# Patient Record
Sex: FEMALE | Race: WHITE | Employment: UNEMPLOYED | RURAL
[De-identification: names, ages, dates, MRNs, and addresses within clinical notes are randomized per-mention and may not be internally consistent; named-entity substitution may affect disease eponyms.]

---

## 2017-01-31 ENCOUNTER — HOSPITAL ENCOUNTER (OUTPATIENT)
Dept: OTHER | Age: 27
Discharge: HOME | End: 2017-01-31
Attending: NURSE PRACTITIONER | Admitting: NURSE PRACTITIONER

## 2017-11-16 ENCOUNTER — HOSPITAL ENCOUNTER (OUTPATIENT)
Facility: HOSPITAL | Age: 27
Setting detail: OBSERVATION
Discharge: LEFT BEFORE DISCHARGE | End: 2017-11-16
Attending: NURSE PRACTITIONER | Admitting: NURSE PRACTITIONER

## 2017-11-16 VITALS
DIASTOLIC BLOOD PRESSURE: 71 MMHG | OXYGEN SATURATION: 100 % | WEIGHT: 155 LBS | TEMPERATURE: 98.7 F | SYSTOLIC BLOOD PRESSURE: 110 MMHG | BODY MASS INDEX: 30.43 KG/M2 | HEART RATE: 78 BPM | HEIGHT: 60 IN

## 2017-11-16 PROBLEM — Z34.90 PREGNANCY: Status: ACTIVE | Noted: 2017-11-16

## 2017-11-16 LAB
AMPHET+METHAMPHET UR QL: NEGATIVE
AMPHETAMINES UR QL: NEGATIVE
BARBITURATES UR QL SCN: NEGATIVE
BENZODIAZ UR QL SCN: NEGATIVE
BILIRUB BLD-MCNC: NEGATIVE MG/DL
BUPRENORPHINE SERPL-MCNC: POSITIVE NG/ML
CANNABINOIDS SERPL QL: NEGATIVE
CLARITY, POC: CLEAR
COCAINE UR QL: NEGATIVE
COLOR UR: YELLOW
GLUCOSE UR STRIP-MCNC: NEGATIVE MG/DL
KETONES UR QL: NEGATIVE
LEUKOCYTE EST, POC: NEGATIVE
METHADONE UR QL SCN: NEGATIVE
NITRITE UR-MCNC: NEGATIVE MG/ML
OPIATES UR QL: NEGATIVE
OXYCODONE UR QL SCN: NEGATIVE
PCP UR QL SCN: NEGATIVE
PH UR: 6.5 [PH] (ref 5–8)
PROPOXYPH UR QL: NEGATIVE
PROT UR STRIP-MCNC: NEGATIVE MG/DL
RBC # UR STRIP: NEGATIVE /UL
SP GR UR: 1.01 (ref 1–1.03)
TRICYCLICS UR QL SCN: NEGATIVE
UROBILINOGEN UR QL: NORMAL

## 2017-11-16 PROCEDURE — 59025 FETAL NON-STRESS TEST: CPT

## 2017-11-16 PROCEDURE — 99234 HOSP IP/OBS SM DT SF/LOW 45: CPT | Performed by: NURSE PRACTITIONER

## 2017-11-16 PROCEDURE — G0378 HOSPITAL OBSERVATION PER HR: HCPCS

## 2017-11-16 PROCEDURE — 80306 DRUG TEST PRSMV INSTRMNT: CPT | Performed by: NURSE PRACTITIONER

## 2017-11-16 PROCEDURE — 81002 URINALYSIS NONAUTO W/O SCOPE: CPT | Performed by: NURSE PRACTITIONER

## 2017-11-16 PROCEDURE — 59025 FETAL NON-STRESS TEST: CPT | Performed by: NURSE PRACTITIONER

## 2017-11-16 PROCEDURE — G0463 HOSPITAL OUTPT CLINIC VISIT: HCPCS

## 2017-11-16 RX ORDER — BUPRENORPHINE 2 MG/1
1 TABLET SUBLINGUAL DAILY
COMMUNITY
End: 2022-10-21 | Stop reason: ALTCHOICE

## 2017-11-16 RX ORDER — SODIUM CHLORIDE, SODIUM LACTATE, POTASSIUM CHLORIDE, CALCIUM CHLORIDE 600; 310; 30; 20 MG/100ML; MG/100ML; MG/100ML; MG/100ML
999 INJECTION, SOLUTION INTRAVENOUS CONTINUOUS
Status: DISCONTINUED | OUTPATIENT
Start: 2017-11-16 | End: 2017-11-16 | Stop reason: HOSPADM

## 2017-11-16 RX ORDER — PRENATAL VIT NO.126/IRON/FOLIC 28MG-0.8MG
1 TABLET ORAL DAILY
COMMUNITY
End: 2022-10-21

## 2017-11-17 NOTE — NON STRESS TEST
Shira Toro, a  at 39w5d with an BALWINDER of 2017, by Last Menstrual Period, was seen at [facility] for a nonstress test.    Chief Complaint   Patient presents with   • Abdominal Pain     Pt states she has had some cramping in her lower abdomen, and aching in bilateral hips.        Interpretation A  Nonstress Test Interpretation A: Reactive (17 1900 : Jie Salazar RN)

## 2017-11-17 NOTE — PROGRESS NOTES
TC per RN with report on Shira.  She is a  at 39 5/7 wks gestation.  Pt reports her PNC is per Dr. Pagan up to 36 wks gestation.  She was to continue f/u care with [facility] though has not made any appts.    She states she is high risk with an autoimmune issues associated with Chrons disease.  Also + fibromyalgia.  She also receives subutex 1 tablet /  Day  Previous  at term without complications.  Pt states she came in with hip and pelvic pain and also wanted to get baby checked out.  O.  VSS - AF        Ctx's occasional         FHT's 130 with decreased variability on arrival and accel to 155 and remained as baseline.        V/E closed / cx posterior  A. IUP at 39 5/7 wks gestation       + subutex therapy      No PNC x 3 + wks      No Prenatal hx available from Dr. Sarmiento office      FHT's require continued evaluation   P.  IVF's         Continued EFM         RN reported pt refused and was leaving -   RN informed pt she is leaving against medical advise as further evaluation of fetal well being is necessary.   Pt refuse to stay - signed out AMA.

## 2018-08-09 ENCOUNTER — LAB REQUISITION (OUTPATIENT)
Dept: LAB | Facility: HOSPITAL | Age: 28
End: 2018-08-09

## 2018-08-09 ENCOUNTER — OFFICE VISIT (OUTPATIENT)
Dept: NEUROLOGY | Facility: CLINIC | Age: 28
End: 2018-08-09

## 2018-08-09 VITALS
HEIGHT: 60 IN | SYSTOLIC BLOOD PRESSURE: 129 MMHG | HEART RATE: 92 BPM | WEIGHT: 170 LBS | BODY MASS INDEX: 33.38 KG/M2 | DIASTOLIC BLOOD PRESSURE: 81 MMHG

## 2018-08-09 DIAGNOSIS — F11.20 UNCOMPLICATED OPIOID DEPENDENCE (HCC): ICD-10-CM

## 2018-08-09 DIAGNOSIS — R53.82 CHRONIC FATIGUE: ICD-10-CM

## 2018-08-09 DIAGNOSIS — M79.7 FIBROMYALGIA: Primary | ICD-10-CM

## 2018-08-09 DIAGNOSIS — Z00.00 ROUTINE GENERAL MEDICAL EXAMINATION AT A HEALTH CARE FACILITY: ICD-10-CM

## 2018-08-09 PROCEDURE — 99244 OFF/OP CNSLTJ NEW/EST MOD 40: CPT | Performed by: PSYCHIATRY & NEUROLOGY

## 2018-08-09 PROCEDURE — 36415 COLL VENOUS BLD VENIPUNCTURE: CPT | Performed by: PSYCHIATRY & NEUROLOGY

## 2018-08-09 RX ORDER — GABAPENTIN 600 MG/1
800 TABLET ORAL 3 TIMES DAILY
COMMUNITY

## 2018-08-09 RX ORDER — PREGABALIN 50 MG/1
50 CAPSULE ORAL 3 TIMES DAILY
Qty: 60 CAPSULE | Refills: 2
Start: 2018-08-09 | End: 2019-08-09

## 2018-08-09 NOTE — PROGRESS NOTES
Subjective:     Patient ID: Shira Toro is a 28 y.o. female.    CC:   Chief Complaint   Patient presents with   • Fibromyalgia       HPI:   History of Present Illness  The following portions of the patient's history were reviewed and updated as appropriate: allergies, current medications, past family history, past medical history, past social history, past surgical history and problem list.     27 y/o female with h/o RA, FMS, Chrohn's, opiate dependence, previously seen by rheumatology who tells me started on gabapentin about 9 years ago and feels that it is no longer working for her. She has tried Lyrica in the past and has found it helpful. She has been on Cymbalta for several years. Complains of fairly severe diffuse aches and pains, muscle soreness, chronic fatigue.    Past Medical History:   Diagnosis Date   • Anemia    • Anxiety    • Arthritis    • Autoimmune disease (CMS/HCC)     chrohns related.    • Crohn's disease (CMS/HCC)    • Fibromyalgia        Past Surgical History:   Procedure Laterality Date   • COLONOSCOPY     • URACHUS EXCISION      urachus rupture as a baby       Social History     Social History   • Marital status: Single     Spouse name: N/A   • Number of children: N/A   • Years of education: N/A     Occupational History   • Not on file.     Social History Main Topics   • Smoking status: Current Every Day Smoker     Packs/day: 1.50     Types: Cigarettes   • Smokeless tobacco: Never Used   • Alcohol use No   • Drug use: Yes     Types: Heroin      Comment: Pt on subtex   • Sexual activity: Yes     Partners: Male     Other Topics Concern   • Not on file     Social History Narrative   • No narrative on file       Family History   Problem Relation Age of Onset   • Coronary artery disease Mother    • Heart attack Mother    • Hypertension Mother    • Hypertension Brother         Review of Systems   Constitutional: Positive for appetite change, fatigue and unexpected weight change. Negative for  chills and fever.   HENT: Negative for ear pain, hearing loss, nosebleeds, rhinorrhea and sore throat.    Eyes: Negative for photophobia, pain, discharge, itching and visual disturbance.   Respiratory: Positive for cough. Negative for chest tightness, shortness of breath and wheezing.    Cardiovascular: Negative for chest pain, palpitations and leg swelling.   Gastrointestinal: Positive for abdominal pain, diarrhea, nausea and vomiting. Negative for blood in stool and constipation.   Endocrine: Positive for cold intolerance.   Genitourinary: Negative for dysuria, frequency, hematuria and urgency.   Musculoskeletal: Positive for arthralgias, back pain, joint swelling, myalgias, neck pain and neck stiffness. Negative for gait problem.   Skin: Positive for pallor. Negative for rash and wound.   Allergic/Immunologic: Negative for environmental allergies and food allergies.   Neurological: Positive for light-headedness and numbness. Negative for dizziness, tremors, seizures, syncope, speech difficulty, weakness and headaches.   Hematological: Negative for adenopathy. Bruises/bleeds easily.   Psychiatric/Behavioral: Positive for decreased concentration. Negative for agitation, confusion, hallucinations, sleep disturbance and suicidal ideas. The patient is not nervous/anxious.         Objective:    Neurologic Exam     Mental Status   Oriented to person, place, and time.     Cranial Nerves     CN III, IV, VI   Pupils are equal, round, and reactive to light.  Extraocular motions are normal.     Motor Exam     Strength   Strength 5/5 throughout.       Physical Exam   Constitutional: She is oriented to person, place, and time. She appears well-developed and well-nourished.   HENT:   Head: Normocephalic and atraumatic.   Eyes: Pupils are equal, round, and reactive to light. EOM are normal.   Neck: Normal range of motion. Neck supple. Carotid bruit is not present.   Cardiovascular: Normal rate, regular rhythm, normal heart  sounds and intact distal pulses.    Pulmonary/Chest: Effort normal and breath sounds normal.   Abdominal: Soft. Bowel sounds are normal.   Musculoskeletal: Normal range of motion.   Bilateral nail clubbing   Neurological: She is alert and oriented to person, place, and time. She has normal strength and normal reflexes. No cranial nerve deficit or sensory deficit. She displays a negative Romberg sign. Coordination and gait normal. She displays no Babinski's sign on the right side. She displays no Babinski's sign on the left side.   Skin: Skin is warm.   Psychiatric: She has a normal mood and affect. Her behavior is normal. Thought content normal. Cognition and memory are normal.       Assessment/Plan:       Shira was seen today for fibromyalgia.    Diagnoses and all orders for this visit:    Fibromyalgia  -     C-reactive Protein  -     Comprehensive Metabolic Panel  -     CK  -     CBC & Differential  -     TSH  -     Vitamin B12  -     Methylmalonic Acid, Serum  -     Folate  -     RPR  -     Antinuclear Antibody With Reflex Cascade  -     Rheumatoid Factor  -     B. Burgdorferi Antibodies, WB Reflex  -     Drug Screen 11 w/Conf, Serum  -     pregabalin (LYRICA) 50 MG capsule; Take 1 capsule by mouth 3 (Three) Times a Day.    Chronic fatigue  -     TSH  -     Vitamin B12  -     Methylmalonic Acid, Serum  -     Folate    Uncomplicated opioid dependence (CMS/HCC)  -     Drug Screen 11 w/Conf, Serum         The patient has read and signed the [facility] Controlled Substance Contract.  I will continue to see patient for regular follow up appointments.  They are well controlled on their medication.  MIAN is updated every 3 months. The patient is aware of the potential for addiction and dependence.    Laureano Mi MD  8/9/2018

## 2018-08-10 ENCOUNTER — TELEPHONE (OUTPATIENT)
Dept: NEUROLOGY | Facility: CLINIC | Age: 28
End: 2018-08-10

## 2018-08-10 NOTE — TELEPHONE ENCOUNTER
----- Message from Pauline Espinal sent at 8/10/2018  9:13 AM EDT -----  Regarding: DR. CAMPBELL/MEDICATION  PATIENT HAS CALLED NEEDING A PA ON HER LYRICA.  PATIENT USES [facility] AND I CONFIRMED THE PHONE.  SHE HAD CALLED YESTERDAY TOO.

## 2018-08-16 LAB
11OH-THC SPEC-MCNC: NEGATIVE NG/ML
ALBUMIN SERPL-MCNC: 4.61 G/DL (ref 3.2–4.8)
ALBUMIN/GLOB SERPL: 1.7 G/DL (ref 1.5–2.5)
ALP SERPL-CCNC: 78 U/L (ref 25–100)
ALT SERPL-CCNC: 16 U/L (ref 7–40)
AMPHETAMINES SERPL QL SCN: NEGATIVE NG/ML
ANA SER QL: NEGATIVE
AST SERPL-CCNC: 19 U/L (ref 0–33)
B BURGDOR IGG+IGM SER-ACNC: <0.91 ISR (ref 0–0.9)
B BURGDOR IGM SER IA-ACNC: <0.8 INDEX (ref 0–0.79)
BARBITURATES SERPL QL SCN: NEGATIVE UG/ML
BASOPHILS # BLD AUTO: 0.04 10*3/MM3 (ref 0–0.2)
BASOPHILS NFR BLD AUTO: 0.4 % (ref 0–1)
BENZODIAZ SERPL QL SCN: NEGATIVE NG/ML
BILIRUB SERPL-MCNC: 0.2 MG/DL (ref 0.3–1.2)
BUN SERPL-MCNC: 9 MG/DL (ref 9–23)
BUN/CREAT SERPL: 13.4 (ref 7–25)
CALCIUM SERPL-MCNC: 9.3 MG/DL (ref 8.7–10.4)
CANNABIDIOL SERPLBLD CFM-MCNC: NEGATIVE NG/ML
CANNABINOIDS SERPL QL SCN: ABNORMAL NG/ML
CANNABINOIDS SPEC QL CFM: POSITIVE
CANNABINOL: NEGATIVE NG/ML
CARBOXYTHC SPEC-MCNC: 13.4 NG/ML
CHLORIDE SERPL-SCNC: 112 MMOL/L (ref 99–109)
CK SERPL-CCNC: 73 U/L (ref 26–174)
CO2 SERPL-SCNC: 25 MMOL/L (ref 20–31)
COCAINE+BZE SERPL QL SCN: NEGATIVE NG/ML
CREAT SERPL-MCNC: 0.67 MG/DL (ref 0.6–1.3)
CRP SERPL-MCNC: 0.22 MG/DL (ref 0–1)
EOSINOPHIL # BLD AUTO: 0.75 10*3/MM3 (ref 0–0.3)
EOSINOPHIL NFR BLD AUTO: 8.3 % (ref 0–3)
ERYTHROCYTE [DISTWIDTH] IN BLOOD BY AUTOMATED COUNT: 12.7 % (ref 11.3–14.5)
ETHANOL UR-MCNC: NEGATIVE GM/DL
FOLATE SERPL-MCNC: 8.01 NG/ML (ref 3.2–20)
GLOBULIN SER CALC-MCNC: 2.8 GM/DL
GLUCOSE SERPL-MCNC: 86 MG/DL (ref 70–100)
HCT VFR BLD AUTO: 46.1 % (ref 34.5–44)
HGB BLD-MCNC: 15.4 G/DL (ref 11.5–15.5)
IMM GRANULOCYTES # BLD: 0.02 10*3/MM3 (ref 0–0.03)
IMM GRANULOCYTES NFR BLD: 0.2 % (ref 0–0.6)
LYMPHOCYTES # BLD AUTO: 3.06 10*3/MM3 (ref 0.6–4.8)
LYMPHOCYTES NFR BLD AUTO: 33.9 % (ref 24–44)
Lab: NORMAL
Lab: NORMAL
MCH RBC QN AUTO: 31 PG (ref 27–31)
MCHC RBC AUTO-ENTMCNC: 33.4 G/DL (ref 32–36)
MCV RBC AUTO: 92.8 FL (ref 80–99)
METHADONE SERPL QL SCN: NEGATIVE NG/ML
METHYLMALONATE SERPL-SCNC: 149 NMOL/L (ref 0–378)
MONOCYTES # BLD AUTO: 0.35 10*3/MM3 (ref 0–1)
MONOCYTES NFR BLD AUTO: 3.9 % (ref 0–12)
NEUTROPHILS # BLD AUTO: 4.83 10*3/MM3 (ref 1.5–8.3)
NEUTROPHILS NFR BLD AUTO: 53.5 % (ref 41–71)
OPIATES SERPL QL SCN: NEGATIVE NG/ML
OXYCODONE+OXYMORPHONE SERPLBLD QL SCN: NEGATIVE NG/ML
PCP SERPL QL SCN: NEGATIVE NG/ML
PLATELET # BLD AUTO: 236 10*3/MM3 (ref 150–450)
POTASSIUM SERPL-SCNC: 4 MMOL/L (ref 3.5–5.5)
PROPOXYPH SERPL QL SCN: NEGATIVE NG/ML
PROT SERPL-MCNC: 7.4 G/DL (ref 5.7–8.2)
RBC # BLD AUTO: 4.97 10*6/MM3 (ref 3.89–5.14)
RHEUMATOID FACT SERPL-ACNC: <10 IU/ML (ref 0–13.9)
RPR SER QL: NON REACTIVE
SODIUM SERPL-SCNC: 140 MMOL/L (ref 132–146)
THC SERPLBLD CFM-MCNC: 1.8 NG/ML
TSH SERPL DL<=0.005 MIU/L-ACNC: 1.17 MIU/ML (ref 0.35–5.35)
VIT B12 SERPL-MCNC: 744 PG/ML (ref 211–911)
WBC # BLD AUTO: 9.03 10*3/MM3 (ref 3.5–10.8)

## 2018-08-17 ENCOUNTER — TELEPHONE (OUTPATIENT)
Dept: NEUROLOGY | Facility: CLINIC | Age: 28
End: 2018-08-17

## 2018-08-17 NOTE — TELEPHONE ENCOUNTER
----- Message from Laureano Mi MD sent at 8/17/2018  8:27 AM EDT -----  Regarding: labs  Normal, thanks.  ----- Message -----  From: Interface, Reflab Results In  Sent: 8/16/2018  11:16 AM  To: Laureano Mi MD

## 2019-03-29 ENCOUNTER — HOSPITAL ENCOUNTER (EMERGENCY)
Facility: HOSPITAL | Age: 29
Discharge: HOME | End: 2019-03-30
Attending: FAMILY MEDICINE
Payer: COMMERCIAL

## 2019-03-29 DIAGNOSIS — S93.402A SPRAIN OF LEFT ANKLE, UNSPECIFIED LIGAMENT, INITIAL ENCOUNTER: Primary | ICD-10-CM

## 2019-03-29 PROCEDURE — 99283 EMERGENCY DEPT VISIT LOW MDM: CPT

## 2019-03-29 ASSESSMENT — PAIN DESCRIPTION - LOCATION: LOCATION: ANKLE;FOOT

## 2019-03-29 ASSESSMENT — PAIN DESCRIPTION - DESCRIPTORS: DESCRIPTORS: SORE;PRESSURE;SHARP

## 2019-03-29 ASSESSMENT — PAIN DESCRIPTION - ORIENTATION: ORIENTATION: LEFT

## 2019-03-29 ASSESSMENT — PAIN DESCRIPTION - PAIN TYPE: TYPE: ACUTE PAIN

## 2019-03-29 ASSESSMENT — PAIN DESCRIPTION - ONSET: ONSET: ON-GOING

## 2019-03-29 ASSESSMENT — PAIN SCALES - GENERAL: PAINLEVEL_OUTOF10: 8

## 2019-03-29 ASSESSMENT — PAIN DESCRIPTION - FREQUENCY: FREQUENCY: INTERMITTENT

## 2019-03-30 ENCOUNTER — APPOINTMENT (OUTPATIENT)
Dept: GENERAL RADIOLOGY | Facility: HOSPITAL | Age: 29
End: 2019-03-30
Payer: COMMERCIAL

## 2019-03-30 VITALS
RESPIRATION RATE: 20 BRPM | TEMPERATURE: 98.3 F | SYSTOLIC BLOOD PRESSURE: 136 MMHG | BODY MASS INDEX: 41.23 KG/M2 | WEIGHT: 210 LBS | HEIGHT: 60 IN | DIASTOLIC BLOOD PRESSURE: 82 MMHG | OXYGEN SATURATION: 99 % | HEART RATE: 91 BPM

## 2019-03-30 PROCEDURE — 73610 X-RAY EXAM OF ANKLE: CPT

## 2019-03-30 NOTE — ED NOTES
Pt. Discharged home with take home instructions and work excuse pt. To follow up with PCP in 1 week pt.  Indicates understands instructions      Polly Soto RN  03/30/19 [id number]

## 2019-03-30 NOTE — ED PROVIDER NOTES
hcl]    FAMILY HISTORY       Family History   Problem Relation Age of Onset    Heart Disease Mother           SOCIAL HISTORY       Social History     Socioeconomic History    Marital status: Single     Spouse name: None    Number of children: None    Years of education: None    Highest education level: None   Occupational History    None   Social Needs    Financial resource strain: None    Food insecurity:     Worry: None     Inability: None    Transportation needs:     Medical: None     Non-medical: None   Tobacco Use    Smoking status: Current Every Day Smoker     Packs/day: 1.00     Years: 5.00     Pack years: 5.00     Types: Cigarettes    Smokeless tobacco: Never Used   Substance and Sexual Activity    Alcohol use: No    Drug use: No    Sexual activity: Yes     Partners: Male   Lifestyle    Physical activity:     Days per week: None     Minutes per session: None    Stress: None   Relationships    Social connections:     Talks on phone: None     Gets together: None     Attends Gnosticist service: None     Active member of club or organization: None     Attends meetings of clubs or organizations: None     Relationship status: None    Intimate partner violence:     Fear of current or ex partner: None     Emotionally abused: None     Physically abused: None     Forced sexual activity: None   Other Topics Concern    None   Social History Narrative    None       SCREENINGS             PHYSICAL EXAM    (up to 7 for level 4, 8 or more for level 5)     ED Triage Vitals [03/29/19 2325]   BP Temp Temp Source Pulse Resp SpO2 Height Weight   (!) 131/91 99 °F (37.2 °C) Oral 106 18 98 % 5' (1.524 m) 210 lb (95.3 kg)       Physical Exam   Constitutional: She is oriented to person, place, and time. She appears well-developed and well-nourished. No distress. HENT:   Head: Normocephalic and atraumatic. Eyes: Pupils are equal, round, and reactive to light. Conjunctivae and EOM are normal.   Neck: Neck supple. CONSULTS:  None    PROCEDURES:  ACE placed to left ankle, by me, 4\" wrap, good fit, neurovascularly intact left foot. PROGRESS NOTES:    Will get film to assess ankle. Was going to give pt NSAIDs but allergic to Tramadol, NSAIDs and Codeine. Pt now says she is on Suboxone. No meds given. She can take Tylenol at home. FINAL IMPRESSION      1.  Sprain of left ankle, unspecified ligament, initial encounter New Problem         DISPOSITION/PLAN   DISPOSITION        PATIENT REFERRED TO:  BOY Stokes CNP  [address] [place] [id number]  [phone]    Schedule an appointment as soon as possible for a visit in 1 week  If symptoms worsen      DISCHARGE MEDICATIONS:  Current Discharge Medication List          (Please note that portions of this note were completed with a voice recognition program.  Efforts were made to edit the dictations but occasionallywords are mis-transcribed.)    Cydney Bhatia DO (electronically signed)  Attending Emergency Physician          Cydney Bhatia DO  03/30/19 74-03 [facility],   03/30/19 8850

## 2019-05-13 ENCOUNTER — HOSPITAL ENCOUNTER (OUTPATIENT)
Facility: HOSPITAL | Age: 29
Discharge: HOME | End: 2019-05-13
Payer: COMMERCIAL

## 2019-05-13 PROCEDURE — 86480 TB TEST CELL IMMUN MEASURE: CPT

## 2019-05-16 LAB
QUANTI TB GOLD PLUS: NEGATIVE
QUANTI TB1 MINUS NIL: 0.06 IU/ML (ref 0–0.34)
QUANTI TB2 MINUS NIL: 0.06 IU/ML (ref 0–0.34)
QUANTIFERON MITOGEN: >10 IU/ML
QUANTIFERON NIL: 0.18 IU/ML

## 2019-06-22 ENCOUNTER — APPOINTMENT (OUTPATIENT)
Dept: GENERAL RADIOLOGY | Facility: HOSPITAL | Age: 29
End: 2019-06-22

## 2019-06-22 ENCOUNTER — HOSPITAL ENCOUNTER (EMERGENCY)
Facility: HOSPITAL | Age: 29
Discharge: HOME | End: 2019-06-22
Attending: EMERGENCY MEDICINE | Admitting: EMERGENCY MEDICINE

## 2019-06-22 VITALS
TEMPERATURE: 97.9 F | HEIGHT: 60 IN | DIASTOLIC BLOOD PRESSURE: 76 MMHG | BODY MASS INDEX: 41.23 KG/M2 | RESPIRATION RATE: 19 BRPM | WEIGHT: 210 LBS | HEART RATE: 98 BPM | SYSTOLIC BLOOD PRESSURE: 128 MMHG | OXYGEN SATURATION: 98 %

## 2019-06-22 DIAGNOSIS — S93.602A FOOT SPRAIN, LEFT, INITIAL ENCOUNTER: Primary | ICD-10-CM

## 2019-06-22 PROCEDURE — 73630 X-RAY EXAM OF FOOT: CPT

## 2019-06-22 PROCEDURE — 99283 EMERGENCY DEPT VISIT LOW MDM: CPT

## 2019-06-22 RX ORDER — ACETAMINOPHEN 325 MG/1
975 TABLET ORAL ONCE
Status: COMPLETED | OUTPATIENT
Start: 2019-06-22 | End: 2019-06-22

## 2019-06-22 RX ORDER — BUPRENORPHINE HYDROCHLORIDE AND NALOXONE HYDROCHLORIDE DIHYDRATE 8; 2 MG/1; MG/1
1 TABLET SUBLINGUAL DAILY
COMMUNITY
End: 2022-10-21 | Stop reason: ALTCHOICE

## 2019-06-22 RX ADMIN — ACETAMINOPHEN 975 MG: 325 TABLET, FILM COATED ORAL at 22:50

## 2019-10-22 ENCOUNTER — HOSPITAL ENCOUNTER (OUTPATIENT)
Facility: HOSPITAL | Age: 29
Discharge: HOME | End: 2019-10-22
Payer: COMMERCIAL

## 2019-10-22 ENCOUNTER — OFFICE VISIT (OUTPATIENT)
Dept: PRIMARY CARE CLINIC | Age: 29
End: 2019-10-22
Payer: COMMERCIAL

## 2019-10-22 VITALS
OXYGEN SATURATION: 98 % | HEIGHT: 60 IN | BODY MASS INDEX: 35.14 KG/M2 | DIASTOLIC BLOOD PRESSURE: 84 MMHG | WEIGHT: 179 LBS | HEART RATE: 110 BPM | SYSTOLIC BLOOD PRESSURE: 130 MMHG

## 2019-10-22 DIAGNOSIS — M54.2 NECK PAIN: ICD-10-CM

## 2019-10-22 DIAGNOSIS — M79.7 FIBROMYALGIA: ICD-10-CM

## 2019-10-22 DIAGNOSIS — F11.11 HISTORY OF OPIOID ABUSE (HCC): ICD-10-CM

## 2019-10-22 DIAGNOSIS — Z79.899 MEDICATION MANAGEMENT: ICD-10-CM

## 2019-10-22 DIAGNOSIS — Z87.19 HISTORY OF CROHN'S DISEASE: ICD-10-CM

## 2019-10-22 DIAGNOSIS — M54.2 NECK PAIN: Primary | ICD-10-CM

## 2019-10-22 LAB
A/G RATIO: 1.2 (ref 0.8–2)
ALBUMIN SERPL-MCNC: 4.3 G/DL (ref 3.4–4.8)
ALP BLD-CCNC: 91 U/L (ref 25–100)
ALT SERPL-CCNC: 8 U/L (ref 4–36)
ANION GAP SERPL CALCULATED.3IONS-SCNC: 17 MMOL/L (ref 3–16)
AST SERPL-CCNC: 12 U/L (ref 8–33)
BASOPHILS ABSOLUTE: 0 K/UL (ref 0–0.1)
BASOPHILS RELATIVE PERCENT: 0.4 %
BILIRUB SERPL-MCNC: 0.3 MG/DL (ref 0.3–1.2)
BUN BLDV-MCNC: 4 MG/DL (ref 6–20)
CALCIUM SERPL-MCNC: 9.8 MG/DL (ref 8.5–10.5)
CHLORIDE BLD-SCNC: 102 MMOL/L (ref 98–107)
CO2: 24 MMOL/L (ref 20–30)
CREAT SERPL-MCNC: 0.6 MG/DL (ref 0.4–1.2)
EOSINOPHILS ABSOLUTE: 0.2 K/UL (ref 0–0.4)
EOSINOPHILS RELATIVE PERCENT: 1.7 %
GFR AFRICAN AMERICAN: >59
GFR NON-AFRICAN AMERICAN: >60
GLOBULIN: 3.5 G/DL
GLUCOSE BLD-MCNC: 84 MG/DL (ref 74–106)
HCT VFR BLD CALC: 47.1 % (ref 37–47)
HEMOGLOBIN: 14.8 G/DL (ref 11.5–16.5)
IMMATURE GRANULOCYTES #: 0 K/UL
IMMATURE GRANULOCYTES %: 0.3 % (ref 0–5)
LYMPHOCYTES ABSOLUTE: 2.6 K/UL (ref 1.5–4)
LYMPHOCYTES RELATIVE PERCENT: 23.4 %
MCH RBC QN AUTO: 29.8 PG (ref 27–32)
MCHC RBC AUTO-ENTMCNC: 31.4 G/DL (ref 31–35)
MCV RBC AUTO: 94.8 FL (ref 80–100)
MONOCYTES ABSOLUTE: 0.6 K/UL (ref 0.2–0.8)
MONOCYTES RELATIVE PERCENT: 5.8 %
NEUTROPHILS ABSOLUTE: 7.6 K/UL (ref 2–7.5)
NEUTROPHILS RELATIVE PERCENT: 68.4 %
PDW BLD-RTO: 12.3 % (ref 11–16)
PLATELET # BLD: 376 K/UL (ref 150–400)
PMV BLD AUTO: 10.2 FL (ref 6–10)
POTASSIUM SERPL-SCNC: 4.5 MMOL/L (ref 3.4–5.1)
RBC # BLD: 4.97 M/UL (ref 3.8–5.8)
SODIUM BLD-SCNC: 143 MMOL/L (ref 136–145)
TOTAL PROTEIN: 7.8 G/DL (ref 6.4–8.3)
TSH REFLEX: 1.16 UIU/ML (ref 0.35–5.5)
WBC # BLD: 11.1 K/UL (ref 4–11)

## 2019-10-22 PROCEDURE — G8419 CALC BMI OUT NRM PARAM NOF/U: HCPCS | Performed by: PEDIATRICS

## 2019-10-22 PROCEDURE — 4004F PT TOBACCO SCREEN RCVD TLK: CPT | Performed by: PEDIATRICS

## 2019-10-22 PROCEDURE — 96372 THER/PROPH/DIAG INJ SC/IM: CPT | Performed by: PEDIATRICS

## 2019-10-22 PROCEDURE — 85025 COMPLETE CBC W/AUTO DIFF WBC: CPT

## 2019-10-22 PROCEDURE — G8427 DOCREV CUR MEDS BY ELIG CLIN: HCPCS | Performed by: PEDIATRICS

## 2019-10-22 PROCEDURE — G8484 FLU IMMUNIZE NO ADMIN: HCPCS | Performed by: PEDIATRICS

## 2019-10-22 PROCEDURE — 99203 OFFICE O/P NEW LOW 30 MIN: CPT | Performed by: PEDIATRICS

## 2019-10-22 PROCEDURE — 80053 COMPREHEN METABOLIC PANEL: CPT

## 2019-10-22 PROCEDURE — 84443 ASSAY THYROID STIM HORMONE: CPT

## 2019-10-22 RX ORDER — METHYLPREDNISOLONE ACETATE 80 MG/ML
80 INJECTION, SUSPENSION INTRA-ARTICULAR; INTRALESIONAL; INTRAMUSCULAR; SOFT TISSUE ONCE
Status: COMPLETED | OUTPATIENT
Start: 2019-10-22 | End: 2019-10-22

## 2019-10-22 RX ORDER — BUPRENORPHINE AND NALOXONE 8; 2 MG/1; MG/1
1 FILM, SOLUBLE BUCCAL; SUBLINGUAL DAILY
COMMUNITY
End: 2022-05-27 | Stop reason: ALTCHOICE

## 2019-10-22 RX ORDER — METHYLPREDNISOLONE 4 MG/1
TABLET ORAL
Qty: 1 KIT | Refills: 0 | Status: SHIPPED | OUTPATIENT
Start: 2019-10-22 | End: 2019-10-28

## 2019-10-22 RX ORDER — DULOXETIN HYDROCHLORIDE 60 MG/1
120 CAPSULE, DELAYED RELEASE ORAL DAILY
Qty: 30 CAPSULE | Refills: 3 | Status: SHIPPED | OUTPATIENT
Start: 2019-10-22 | End: 2020-01-28 | Stop reason: SDUPTHER

## 2019-10-22 RX ORDER — KETOROLAC TROMETHAMINE 30 MG/ML
30 INJECTION, SOLUTION INTRAMUSCULAR; INTRAVENOUS ONCE
Status: COMPLETED | OUTPATIENT
Start: 2019-10-22 | End: 2019-10-22

## 2019-10-22 RX ADMIN — METHYLPREDNISOLONE ACETATE 80 MG: 80 INJECTION, SUSPENSION INTRA-ARTICULAR; INTRALESIONAL; INTRAMUSCULAR; SOFT TISSUE at 12:31

## 2019-10-22 RX ADMIN — KETOROLAC TROMETHAMINE 30 MG: 30 INJECTION, SOLUTION INTRAMUSCULAR; INTRAVENOUS at 12:31

## 2019-10-22 ASSESSMENT — ENCOUNTER SYMPTOMS
BACK PAIN: 0
SORE THROAT: 0
SINUS PRESSURE: 0
NAUSEA: 0
SHORTNESS OF BREATH: 0
WHEEZING: 0
VOMITING: 0
COUGH: 0
ABDOMINAL PAIN: 0
EYE DISCHARGE: 0

## 2019-10-22 ASSESSMENT — PATIENT HEALTH QUESTIONNAIRE - PHQ9
SUM OF ALL RESPONSES TO PHQ QUESTIONS 1-9: 0
1. LITTLE INTEREST OR PLEASURE IN DOING THINGS: 0
SUM OF ALL RESPONSES TO PHQ QUESTIONS 1-9: 0
SUM OF ALL RESPONSES TO PHQ9 QUESTIONS 1 & 2: 0
2. FEELING DOWN, DEPRESSED OR HOPELESS: 0

## 2019-10-24 RX ORDER — BACLOFEN 10 MG/1
10 TABLET ORAL 2 TIMES DAILY PRN
Qty: 60 TABLET | Refills: 1 | Status: SHIPPED | OUTPATIENT
Start: 2019-10-24 | End: 2019-11-19 | Stop reason: SDUPTHER

## 2019-10-29 ENCOUNTER — OFFICE VISIT (OUTPATIENT)
Dept: PRIMARY CARE CLINIC | Age: 29
End: 2019-10-29
Payer: COMMERCIAL

## 2019-10-29 VITALS
WEIGHT: 177 LBS | HEIGHT: 60 IN | HEART RATE: 89 BPM | OXYGEN SATURATION: 98 % | DIASTOLIC BLOOD PRESSURE: 80 MMHG | BODY MASS INDEX: 34.75 KG/M2 | SYSTOLIC BLOOD PRESSURE: 130 MMHG | RESPIRATION RATE: 16 BRPM

## 2019-10-29 DIAGNOSIS — M79.7 FIBROMYALGIA: Primary | ICD-10-CM

## 2019-10-29 PROBLEM — F11.20 UNCOMPLICATED OPIOID DEPENDENCE (HCC): Status: ACTIVE | Noted: 2018-08-09

## 2019-10-29 PROCEDURE — 99213 OFFICE O/P EST LOW 20 MIN: CPT | Performed by: PEDIATRICS

## 2019-10-29 PROCEDURE — G8484 FLU IMMUNIZE NO ADMIN: HCPCS | Performed by: PEDIATRICS

## 2019-10-29 PROCEDURE — 4004F PT TOBACCO SCREEN RCVD TLK: CPT | Performed by: PEDIATRICS

## 2019-10-29 PROCEDURE — G8427 DOCREV CUR MEDS BY ELIG CLIN: HCPCS | Performed by: PEDIATRICS

## 2019-10-29 PROCEDURE — G8417 CALC BMI ABV UP PARAM F/U: HCPCS | Performed by: PEDIATRICS

## 2019-10-29 RX ORDER — GABAPENTIN 600 MG/1
600 TABLET ORAL 4 TIMES DAILY
Qty: 120 TABLET | Refills: 2 | Status: SHIPPED | OUTPATIENT
Start: 2019-10-29 | End: 2020-01-28 | Stop reason: SDUPTHER

## 2019-10-29 ASSESSMENT — ENCOUNTER SYMPTOMS
VOMITING: 0
WHEEZING: 0
COUGH: 0
NAUSEA: 0
SINUS PRESSURE: 0
EYE DISCHARGE: 0
BACK PAIN: 0
SHORTNESS OF BREATH: 0
SORE THROAT: 0
ABDOMINAL PAIN: 0

## 2019-11-19 RX ORDER — BACLOFEN 10 MG/1
10 TABLET ORAL 2 TIMES DAILY PRN
Qty: 60 TABLET | Refills: 1 | Status: SHIPPED | OUTPATIENT
Start: 2019-11-19 | End: 2020-01-28 | Stop reason: SDUPTHER

## 2020-01-28 ENCOUNTER — OFFICE VISIT (OUTPATIENT)
Dept: PRIMARY CARE CLINIC | Age: 30
End: 2020-01-28
Payer: MEDICAID

## 2020-01-28 VITALS
DIASTOLIC BLOOD PRESSURE: 80 MMHG | RESPIRATION RATE: 16 BRPM | WEIGHT: 147 LBS | SYSTOLIC BLOOD PRESSURE: 126 MMHG | HEIGHT: 60 IN | BODY MASS INDEX: 28.86 KG/M2 | OXYGEN SATURATION: 99 % | HEART RATE: 115 BPM

## 2020-01-28 PROCEDURE — 99214 OFFICE O/P EST MOD 30 MIN: CPT | Performed by: PEDIATRICS

## 2020-01-28 RX ORDER — DULOXETIN HYDROCHLORIDE 60 MG/1
120 CAPSULE, DELAYED RELEASE ORAL DAILY
Qty: 30 CAPSULE | Refills: 3 | Status: SHIPPED | OUTPATIENT
Start: 2020-01-28 | End: 2020-07-15

## 2020-01-28 RX ORDER — BACLOFEN 10 MG/1
10 TABLET ORAL 2 TIMES DAILY PRN
Qty: 60 TABLET | Refills: 1 | Status: SHIPPED | OUTPATIENT
Start: 2020-01-28 | End: 2020-02-26

## 2020-01-28 RX ORDER — ERGOCALCIFEROL 1.25 MG/1
50000 CAPSULE ORAL WEEKLY
Qty: 12 CAPSULE | Refills: 1 | Status: SHIPPED
Start: 2020-01-28 | End: 2020-07-15

## 2020-01-28 RX ORDER — GABAPENTIN 600 MG/1
600 TABLET ORAL 4 TIMES DAILY
Qty: 120 TABLET | Refills: 2 | Status: SHIPPED | OUTPATIENT
Start: 2020-01-28 | End: 2020-03-25

## 2020-01-28 RX ORDER — METHYLPREDNISOLONE 4 MG/1
TABLET ORAL
Qty: 1 KIT | Refills: 0 | Status: SHIPPED | OUTPATIENT
Start: 2020-01-28 | End: 2020-02-03

## 2020-01-28 ASSESSMENT — PATIENT HEALTH QUESTIONNAIRE - PHQ9
SUM OF ALL RESPONSES TO PHQ9 QUESTIONS 1 & 2: 0
SUM OF ALL RESPONSES TO PHQ QUESTIONS 1-9: 0
SUM OF ALL RESPONSES TO PHQ QUESTIONS 1-9: 0
2. FEELING DOWN, DEPRESSED OR HOPELESS: 0
1. LITTLE INTEREST OR PLEASURE IN DOING THINGS: 0

## 2020-01-28 ASSESSMENT — ENCOUNTER SYMPTOMS
EYE DISCHARGE: 0
BACK PAIN: 0
WHEEZING: 0
SINUS PRESSURE: 0
SHORTNESS OF BREATH: 0

## 2020-01-28 NOTE — PROGRESS NOTES
Chief Complaint   Patient presents with    Crohn's Disease     f/u    Other     fibromyalgia f/u       Have you seen any other physician or provider since your last visit no    Have you had any other diagnostic tests since your last visit? no    Have you changed or stopped any medications since your last visit? no     Pt states she has had pneumo inj before.

## 2020-01-28 NOTE — PROGRESS NOTES
SUBJECTIVE:    Patient ID: Rosalia Bynum is a 34 y.o. female. Chief Complaint   Patient presents with    Crohn's Disease     f/u    Other     fibromyalgia f/u       HPI:    Patient's medications, allergies, past medical, surgical,social and family histories were reviewed and updated as appropriate. She is here for a follow up and needs some refills. She has chronic neck pain and fibromyalgia. She also takes cymbalta for depression and nerve pain. She has vit d def. She is also asking for \"more steroids\". She says she has pain in different joints like her elbows and her knees. Neck Pain    This is a new problem. The current episode started 1 to 4 weeks ago. The problem occurs constantly. The problem has been gradually worsening. The pain is associated with nothing. The pain is present in the left side. The quality of the pain is described as aching. The pain is at a severity of 9/10. The pain is moderate. Nothing aggravates the symptoms. The pain is same all the time. Stiffness is present all day. Treatments tried: muscle cream. The treatment provided no relief. Review of Systems   HENT: Negative for sinus pressure. Eyes: Negative for discharge and visual disturbance. Respiratory: Negative for shortness of breath and wheezing. Cardiovascular: Negative for palpitations. Endocrine: Negative for cold intolerance and heat intolerance. Genitourinary: Negative for dysuria, frequency and urgency. Musculoskeletal: Negative for back pain. Skin: Negative for wound. Neurological: Negative for syncope. Hematological: Negative. Psychiatric/Behavioral: Negative for agitation and sleep disturbance. The patient is not nervous/anxious.         Past Medical History:   Diagnosis Date    Arthritis     Cro[facility]'s disease in remission ([facility] [address]     Fibromyalgia     Fibromyalgia     Immune deficiency disorder ([facility] [address]        Past Surgical History:   Procedure Laterality Date    AV FISTULA Conjunctiva/sclera: Conjunctivae normal.      Pupils: Pupils are equal, round, and reactive to light. Neck:      Musculoskeletal: Normal range of motion and neck supple. Thyroid: No thyromegaly. Vascular: No JVD. Trachea: No tracheal deviation. Cardiovascular:      Rate and Rhythm: Normal rate and regular rhythm. Heart sounds: Normal heart sounds. No murmur. Pulmonary:      Effort: Pulmonary effort is normal. No respiratory distress. Breath sounds: Normal breath sounds. No stridor. No wheezing or rales. Chest:      Chest wall: No tenderness. Abdominal:      General: Bowel sounds are normal. There is no distension. Palpations: Abdomen is soft. There is no mass. Tenderness: There is no abdominal tenderness. There is no guarding or rebound. Musculoskeletal: Normal range of motion. General: No tenderness. Cervical back: She exhibits pain. Back:         Legs:    Lymphadenopathy:      Cervical: No cervical adenopathy. Skin:     General: Skin is warm and dry. Findings: No rash. Neurological:      Mental Status: She is alert and oriented to person, place, and time. No results found for requested labs within last 30 days.        Microscopic Examination (no units)   Date Value   01/28/2015 Not Indicated     LDL Calculated (MG/DL)   Date Value   02/12/2014 106         Lab Results   Component Value Date    WBC 11.1 (H) 10/22/2019    HGB 14.8 10/22/2019    HCT 47.1 (H) 10/22/2019    MCV 94.8 10/22/2019     10/22/2019    LYMPHOPCT 23.4 10/22/2019    RBC 4.97 10/22/2019    MCH 29.8 10/22/2019    MCHC 31.4 10/22/2019    RDW 12.3 10/22/2019       Lab Results   Component Value Date     10/22/2019    K 4.5 10/22/2019     10/22/2019    CO2 24 10/22/2019    BUN 4 (L) 10/22/2019    CREATININE 0.6 10/22/2019    GLUCOSE 84 10/22/2019    CALCIUM 9.8 10/22/2019    PROT 7.8 10/22/2019    LABALBU 4.3 10/22/2019    BILITOT 0.3 10/22/2019

## 2020-01-28 NOTE — PATIENT INSTRUCTIONS
· Keep a list of your medicines with you. List all of the prescription medicines, nonprescription medicines, supplements, natural remedies, and vitamins that you take. Tell your healthcare providers who treat you about all of the products you are taking. Your provider can provide you with a form to keep track of them. Just ask. · Follow the directions that come with your medicine, including information about food or alcohol. Make sure you know how and when to take your medicine. Do not take more or less than you are supposed to take. · Keep all medicines out of the reach of children. · Store medicines according to the directions on the label. · Monitor yourself. Learn to know how your body reacts to your new medicine and keep track of how it makes you feel before attempting (If your provider has allowed you to do so) to drive or go to work. · Seek emergency medical attention if you think you have used too much of this medicine. An overdose of any prescription medicine can be fatal. Overdose symptoms may include extreme drowsiness, muscle weakness, confusion, cold and clammy skin, pinpoint pupils, shallow breathing, slow heart rate, fainting, or coma. · Don't share prescription medicines with others, even when they seem to have the same symptoms. What may be good for you may be harmful to others. · If you are no longer taking a prescribed medication and you have pills left please take your pills out of their original containers. Mix crushed pills with an undesirable substance, such as cat litter or used coffee grounds. Put the mixture into a disposable container with a lid, such as an empty margarine tub, or into a sealable bag. Cover up or remove any of your personal information on the empty containers by covering it with black permanent marker or duct tape. Place the sealed container with the mixture, and the empty drug containers, in the trash.    · If you use a medication that is in the form of a patch, dispose of used patches by folding them in half so that the sticky sides meet, and then flushing them down a toilet. They should not be placed in the household trash where children or pets can find them. · If you have any questions, ask your provider or pharmacist for more information. · Be sure to keep all appointments for provider visits or tests. We are committed to providing you with the best care possible. In order to help us achieve these goals please remember to bring all medications, herbal products, and over the counter supplements with you to each visit. If your provider has ordered testing for you, please be sure to follow up with our office if you have not received results within 7 days after the testing took place. *If you receive a survey after visiting one of our offices, please take time to share your experience concerning your physician office visit. These surveys are confidential and no health information about you is shared. We are eager to improve for you and we are counting on your feedback to help make that happen. Thank you for requesting your Continuity of Care Document (CCD) electronically. Please follow the instructions below to securely access your online medical record. [facility] allows you to send messages to your doctor, view your test results, renew your prescriptions, schedule appointments, and more. How Do I Access my CCD? In your Internet browser, go to [email or web address] Enter your user name and password   Click on My medical Record  --> Download Summary --> Enter Password --> Download --> Save or Open Document    Additional Information  If you have questions, please contact your physician practice where you receive care. Remember, [facility] is NOT to be used for urgent needs. For medical emergencies, dial 911. ips to Help You Stop Smoking       Cigarette smoking is a preventable cause of death in the [place].  If you have thought functioning without nicotine. Cut Down the Number of Cigarettes You Smoke   Smoke only half of each cigarette. Each day, postpone the lighting of your first cigarette by one hour. Decide you'll only smoke during odd or even hours of the day. Decide beforehand how many cigarettes you'll smoke during the day. For each additional cigarette, give a dollar to your favorite bart. Change your eating habits to help you cut down. For example, drink milk, which many people consider incompatible with smoking. End meals or snacks with something that won't lead to a cigarette. Reach for a glass of juice instead of a cigarette for a \"pick-me-up. \"   Remember: Cutting down can help you quit, but it's not a substitute for quitting. If you're down to about seven cigarettes a day, it's time to set your target quit date, and get ready to stick to it. Don't Smoke \"Automatically\"   Smoke only those cigarettes you really want. Catch yourself before you light up a cigarette out of pure habit. Don't empty your ashtrays. This will remind you of how many cigarettes you've smoked each day, and the sight and the smell of stale cigarettes butts will be very unpleasant. Make yourself aware of each cigarette by using the opposite hand or putting cigarettes in an unfamiliar location or a different pocket to break the automatic reach. If you light up many times during the day without even thinking about it, try to look in a mirror each time you put a match to your cigarette. You may decide you don't need it. Make Smoking Inconvenient   Stop buying cigarettes by the carton. Wait until one pack is empty before you buy another. Stop carrying cigarettes with you at home or at work. Make them difficult to get to. Make Smoking Unpleasant   Smoke only under circumstances that aren't especially pleasurable for you. If you like to smoke with others, smoke alone.  Turn your chair to an empty corner and focus only on the cigarette associate with cigarette smoking. Strike up conversation instead of a match for a cigarette. If you miss the sensation of having a cigarette in your hand, play with something elsea pencil, a paper clip, a marble. If you miss having something in your mouth, try toothpicks or a fake cigarette.

## 2020-02-26 RX ORDER — BACLOFEN 10 MG/1
10 TABLET ORAL 2 TIMES DAILY PRN
Qty: 60 TABLET | Refills: 1 | Status: SHIPPED | OUTPATIENT
Start: 2020-02-26 | End: 2020-04-15 | Stop reason: SDUPTHER

## 2020-04-15 RX ORDER — BACLOFEN 10 MG/1
10 TABLET ORAL 2 TIMES DAILY PRN
Qty: 60 TABLET | Refills: 1 | Status: SHIPPED | OUTPATIENT
Start: 2020-04-15 | End: 2020-06-23

## 2020-04-15 RX ORDER — GABAPENTIN 600 MG/1
600 TABLET ORAL 4 TIMES DAILY PRN
Qty: 120 TABLET | Refills: 1 | Status: SHIPPED | OUTPATIENT
Start: 2020-04-15 | End: 2020-06-16

## 2020-04-20 ENCOUNTER — TELEPHONE (OUTPATIENT)
Dept: PRIMARY CARE CLINIC | Age: 30
End: 2020-04-20

## 2020-04-20 NOTE — TELEPHONE ENCOUNTER
Has places on body needs infection medication and silvadene cream for the spots. She has Crohn's. disease which causes this.

## 2020-04-22 ENCOUNTER — VIRTUAL VISIT (OUTPATIENT)
Dept: PRIMARY CARE CLINIC | Age: 30
End: 2020-04-22
Payer: MEDICAID

## 2020-04-22 PROCEDURE — 98966 PH1 ASSMT&MGMT NQHP 5-10: CPT | Performed by: NURSE PRACTITIONER

## 2020-04-22 RX ORDER — SULFAMETHOXAZOLE AND TRIMETHOPRIM 800; 160 MG/1; MG/1
1 TABLET ORAL 2 TIMES DAILY
Qty: 20 TABLET | Refills: 0 | Status: SHIPPED | OUTPATIENT
Start: 2020-04-22 | End: 2020-04-25

## 2020-04-25 ENCOUNTER — HOSPITAL ENCOUNTER (EMERGENCY)
Facility: HOSPITAL | Age: 30
Discharge: HOME | End: 2020-04-25
Attending: FAMILY MEDICINE
Payer: MEDICAID

## 2020-04-25 VITALS
HEIGHT: 60 IN | TEMPERATURE: 98.5 F | BODY MASS INDEX: 25.52 KG/M2 | DIASTOLIC BLOOD PRESSURE: 81 MMHG | SYSTOLIC BLOOD PRESSURE: 114 MMHG | WEIGHT: 130 LBS | HEART RATE: 105 BPM | OXYGEN SATURATION: 100 % | RESPIRATION RATE: 16 BRPM

## 2020-04-25 LAB
BASOPHILS ABSOLUTE: 0.1 K/UL (ref 0–0.1)
BASOPHILS RELATIVE PERCENT: 0.6 %
EOSINOPHILS ABSOLUTE: 0.1 K/UL (ref 0–0.4)
EOSINOPHILS RELATIVE PERCENT: 1.1 %
HCT VFR BLD CALC: 41.4 % (ref 37–47)
HEMOGLOBIN: 12.9 G/DL (ref 11.5–16.5)
IMMATURE GRANULOCYTES #: 0.1 K/UL
IMMATURE GRANULOCYTES %: 0.5 % (ref 0–5)
LYMPHOCYTES ABSOLUTE: 4.2 K/UL (ref 1.5–4)
LYMPHOCYTES RELATIVE PERCENT: 33.3 %
MCH RBC QN AUTO: 26.7 PG (ref 27–32)
MCHC RBC AUTO-ENTMCNC: 31.2 G/DL (ref 31–35)
MCV RBC AUTO: 85.7 FL (ref 80–100)
MONOCYTES ABSOLUTE: 0.8 K/UL (ref 0.2–0.8)
MONOCYTES RELATIVE PERCENT: 6 %
NEUTROPHILS ABSOLUTE: 7.4 K/UL (ref 2–7.5)
NEUTROPHILS RELATIVE PERCENT: 58.5 %
PDW BLD-RTO: 13.7 % (ref 11–16)
PLATELET # BLD: 472 K/UL (ref 150–400)
PMV BLD AUTO: 8.1 FL (ref 6–10)
RBC # BLD: 4.83 M/UL (ref 3.8–5.8)
WBC # BLD: 12.6 K/UL (ref 4–11)

## 2020-04-25 PROCEDURE — 87077 CULTURE AEROBIC IDENTIFY: CPT

## 2020-04-25 PROCEDURE — 99283 EMERGENCY DEPT VISIT LOW MDM: CPT

## 2020-04-25 PROCEDURE — 87205 SMEAR GRAM STAIN: CPT

## 2020-04-25 PROCEDURE — 96365 THER/PROPH/DIAG IV INF INIT: CPT

## 2020-04-25 PROCEDURE — 87186 SC STD MICRODIL/AGAR DIL: CPT

## 2020-04-25 PROCEDURE — 36415 COLL VENOUS BLD VENIPUNCTURE: CPT

## 2020-04-25 PROCEDURE — 2500000003 HC RX 250 WO HCPCS: Performed by: FAMILY MEDICINE

## 2020-04-25 PROCEDURE — 85025 COMPLETE CBC W/AUTO DIFF WBC: CPT

## 2020-04-25 PROCEDURE — 96375 TX/PRO/DX INJ NEW DRUG ADDON: CPT

## 2020-04-25 PROCEDURE — 87070 CULTURE OTHR SPECIMN AEROBIC: CPT

## 2020-04-25 PROCEDURE — 2580000003 HC RX 258: Performed by: FAMILY MEDICINE

## 2020-04-25 PROCEDURE — 6360000002 HC RX W HCPCS: Performed by: FAMILY MEDICINE

## 2020-04-25 RX ORDER — DIPHENHYDRAMINE HYDROCHLORIDE 50 MG/ML
25 INJECTION INTRAMUSCULAR; INTRAVENOUS ONCE
Status: COMPLETED | OUTPATIENT
Start: 2020-04-25 | End: 2020-04-25

## 2020-04-25 RX ORDER — PREDNISONE 20 MG/1
TABLET ORAL
Qty: 13 TABLET | Refills: 0 | Status: SHIPPED | OUTPATIENT
Start: 2020-04-25 | End: 2020-07-15

## 2020-04-25 RX ORDER — CLINDAMYCIN HYDROCHLORIDE 300 MG/1
300 CAPSULE ORAL 3 TIMES DAILY
Qty: 21 CAPSULE | Refills: 0 | Status: SHIPPED | OUTPATIENT
Start: 2020-04-25 | End: 2020-05-02

## 2020-04-25 RX ORDER — METHYLPREDNISOLONE SODIUM SUCCINATE 125 MG/2ML
80 INJECTION, POWDER, LYOPHILIZED, FOR SOLUTION INTRAMUSCULAR; INTRAVENOUS ONCE
Status: COMPLETED | OUTPATIENT
Start: 2020-04-25 | End: 2020-04-25

## 2020-04-25 RX ORDER — KETOROLAC TROMETHAMINE 30 MG/ML
30 INJECTION, SOLUTION INTRAMUSCULAR; INTRAVENOUS ONCE
Status: COMPLETED | OUTPATIENT
Start: 2020-04-25 | End: 2020-04-25

## 2020-04-25 RX ADMIN — CEFTRIAXONE 1 G: 1 INJECTION, POWDER, FOR SOLUTION INTRAMUSCULAR; INTRAVENOUS at 03:29

## 2020-04-25 RX ADMIN — DIPHENHYDRAMINE HYDROCHLORIDE 25 MG: 50 INJECTION, SOLUTION INTRAMUSCULAR; INTRAVENOUS at 03:25

## 2020-04-25 RX ADMIN — METHYLPREDNISOLONE SODIUM SUCCINATE 80 MG: 125 INJECTION, POWDER, FOR SOLUTION INTRAMUSCULAR; INTRAVENOUS at 03:22

## 2020-04-25 RX ADMIN — SILVER SULFADIAZINE: 10 CREAM TOPICAL at 03:32

## 2020-04-25 RX ADMIN — KETOROLAC TROMETHAMINE 30 MG: 30 INJECTION, SOLUTION INTRAMUSCULAR at 03:27

## 2020-04-25 ASSESSMENT — PAIN SCALES - GENERAL
PAINLEVEL_OUTOF10: 6
PAINLEVEL_OUTOF10: 7

## 2020-04-25 ASSESSMENT — ENCOUNTER SYMPTOMS: COLOR CHANGE: 1

## 2020-04-25 NOTE — ED NOTES
Discharge instructions reviewed with patient with her verbalized/signed understanding. Patient called her father about arranging for a ride. States she will walk to his house, which is close[place]      Gladis Lynn RN  04/25/20 9848

## 2020-04-25 NOTE — ED TRIAGE NOTES
Patient has wounds on legs. States has had x three weeks, but has had drainage over one week. Patient has scabs to arms and legs. Has had serosanguinous drainage from legs. Denies fever.

## 2020-04-25 NOTE — ED PROVIDER NOTES
[address] [place] [address] Court  eMERGENCY dEPARTMENT eNCOUnter      Pt Name: Calvin Rosario  MRN: [id number]  [place] 1990  Date of evaluation: 4/25/2020  Provider: Darryle Ferns, [address] [place] [address]       Chief Complaint   Patient presents with    Skin Ulcer         HISTORY OF PRESENT ILLNESS   (Location/Symptom, Timing/Onset, Context/Setting, Quality, Duration, Modifying Factors, Severity)  Note limiting factors. Calvin Rosario is a 34 y.o. female who presents to the emergency department for having 3-4 week history of random skin ulcers and wounds that have been developing. She states that when she comes off her immunosuppressants, the skin wounds appear. History of having them denisse on her arms and legs. She states that over the past 1 week, some on her lower legs have opened up and draining. Some scab places are starting to turn yellow. She states that she called PCP and they called antibiotics and Silvadene which she used in the past for the skin wounds but unable to get a ride there to get them. Called in on 4/22 by reading the chart review. Pt states the places are starting to hurt. She hasn't been cleaning them cause it makes the scabs come off. Carrier of MRSA. No fever. Nursing Notes were reviewed. REVIEW OF SYSTEMS    (2-9 systems for level 4, 10 or more forlevel 5)     Review of Systems   Skin: Positive for color change and wound. All other systems reviewed and are negative.           PAST MEDICAL HISTORY     Past Medical History:   Diagnosis Date    Arthritis     [facility]     Fibromyalgia     Fibromyalgia     Immune deficiency disorder ([facility]          SURGICAL HISTORY       Past Surgical History:   Procedure Laterality Date    AV FISTULA REPAIR  1992    COLONOSCOPY  elliott 10, 2015         CURRENT MEDICATIONS       Previous Medications    BACLOFEN (LIORESAL) 10 MG TABLET    Take 1 tablet by mouth 2 times daily as needed (muscle spasms) BUPRENORPHINE-NALOXONE (SUBOXONE) 8-2 MG FILM SL FILM    Place 1 Film under the tongue daily. DULOXETINE (CYMBALTA) 60 MG EXTENDED RELEASE CAPSULE    Take 2 capsules by mouth daily    GABAPENTIN (NEURONTIN) 600 MG TABLET    Take 1 tablet by mouth 4 times daily as needed (pain) for up to 60 days. USTEKINUMAB (STELARA) 90 MG/ML SOSY PREFILLED SYRINGE    Inject 90 mg into the skin once Every 8 weeks    VITAMIN D (ERGOCALCIFEROL) 1.25 MG (41320 UT) CAPS CAPSULE    Take 1 capsule by mouth once a week       ALLERGIES     Humira [adalimumab];  Ibuprofen; Naproxen; Tramadol; Tylenol with codeine #3 [acetaminophen-codeine]; and Zofran [ondansetron hcl]    FAMILY HISTORY       Family History   Problem Relation Age of Onset    Heart Disease Mother           SOCIAL HISTORY       Social History     Socioeconomic History    Marital status: Single     Spouse name: None    Number of children: None    Years of education: None    Highest education level: None   Occupational History    None   Social Needs    Financial resource strain: None    Food insecurity     Worry: None     Inability: None    Transportation needs     Medical: None     Non-medical: None   Tobacco Use    Smoking status: Current Every Day Smoker     Packs/day: 1.00     Years: 5.00     Pack years: 5.00     Types: Cigarettes    Smokeless tobacco: Never Used   Substance and Sexual Activity    Alcohol use: Yes     Comment: occasional    Drug use: Not Currently    Sexual activity: Yes     Partners: Male   Lifestyle    Physical activity     Days per week: None     Minutes per session: None    Stress: None   Relationships    Social connections     Talks on phone: None     Gets together: None     Attends Rastafarian service: None     Active member of club or organization: None     Attends meetings of clubs or organizations: None     Relationship status: None    Intimate partner violence     Fear of current or ex partner: None     Emotionally abused: None Physically abused: None     Forced sexual activity: None   Other Topics Concern    None   Social History Narrative    None       SCREENINGS             PHYSICAL EXAM    (up to 7 for level 4, 8 or more for level 5)     ED Triage Vitals [04/25/20 0208]   BP Temp Temp Source Pulse Resp SpO2 Height Weight   120/79 98.7 °F (37.1 °C) Oral 121 16 99 % 5' (1.524 m) 130 lb (59 kg)       Physical Exam  Vitals signs and nursing note reviewed. Constitutional:       General: She is not in acute distress. Appearance: Normal appearance. HENT:      Head: Normocephalic. Eyes:      Extraocular Movements: Extraocular movements intact. Conjunctiva/sclera: Conjunctivae normal.      Pupils: Pupils are equal, round, and reactive to light. Neck:      Musculoskeletal: Neck supple. Cardiovascular:      Rate and Rhythm: Normal rate and regular rhythm. Heart sounds: Normal heart sounds. Pulmonary:      Effort: Pulmonary effort is normal.      Breath sounds: Normal breath sounds. Musculoskeletal: Normal range of motion. Skin:     General: Skin is warm and dry. Comments: Pt with numerous skin ulcers and scabs covering her upper extremities from upper arm to hands. Pt with similar lesions to her lower abdomen. Most are well circumscribed about 1 cm diameter. Pt with numerous large necrotic superficial skin ulcers forming on her lower legs. Mostly below knees. Right lower leg with 5 large areas ranging 5 cm X 2 cm down to 2 X 2 cm. Left lower leg with 5 ranging from 10 cm X 8 cm down to 4 X 2 cm. Some are draining serosanginous and some yellow exudate in subcutaneous areas   Neurological:      Mental Status: She is alert and oriented to person, place, and time.    Psychiatric:         Mood and Affect: Mood normal.         Behavior: Behavior normal.         DIAGNOSTIC RESULTS     EKG: All EKG's are interpreted by the Emergency Department Physician who either signs or Co-signs this chart in the absence of a re-check      DISCHARGE MEDICATIONS:  New Prescriptions    CLINDAMYCIN (CLEOCIN) 300 MG CAPSULE    Take 1 capsule by mouth 3 times daily for 7 days    PREDNISONE (DELTASONE) 20 MG TABLET    Take 3 tabs on day 1-2, 2 tabs on day 3-4, 1 tab on day 5-7;  Then d/c    SILVER SULFADIAZINE (SILVADENE) 1 % CREAM    Apply topically daily to lower leg skin ulcers       (Please note that portions of this note were completed with a voice recognition program.  Efforts were made to edit the dictations but occasionallywords are mis-transcribed.)    Jose G Lei DO (electronically signed)  Attending Emergency Physician        Jose G Lei DO  04/25/20 0526

## 2020-04-28 LAB
GRAM STAIN RESULT: ABNORMAL
ORGANISM: ABNORMAL
WOUND/ABSCESS: ABNORMAL

## 2020-05-15 ENCOUNTER — HOSPITAL ENCOUNTER (EMERGENCY)
Facility: HOSPITAL | Age: 30
Discharge: HOME | End: 2020-05-16
Attending: EMERGENCY MEDICINE | Admitting: EMERGENCY MEDICINE

## 2020-05-15 DIAGNOSIS — S81.802A NON-HEALING WOUND OF LOWER EXTREMITY, LEFT, INITIAL ENCOUNTER: Primary | ICD-10-CM

## 2020-05-15 LAB
BASOPHILS # BLD AUTO: 0.09 10*3/MM3 (ref 0–0.2)
BASOPHILS NFR BLD AUTO: 0.8 % (ref 0–1.5)
DEPRECATED RDW RBC AUTO: 45 FL (ref 37–54)
EOSINOPHIL # BLD AUTO: 0.63 10*3/MM3 (ref 0–0.4)
EOSINOPHIL NFR BLD AUTO: 5.9 % (ref 0.3–6.2)
ERYTHROCYTE [DISTWIDTH] IN BLOOD BY AUTOMATED COUNT: 14 % (ref 12.3–15.4)
HCT VFR BLD AUTO: 40.7 % (ref 34–46.6)
HGB BLD-MCNC: 12.6 G/DL (ref 12–15.9)
IMM GRANULOCYTES # BLD AUTO: 0.04 10*3/MM3 (ref 0–0.05)
IMM GRANULOCYTES NFR BLD AUTO: 0.4 % (ref 0–0.5)
LYMPHOCYTES # BLD AUTO: 3.82 10*3/MM3 (ref 0.7–3.1)
LYMPHOCYTES NFR BLD AUTO: 35.5 % (ref 19.6–45.3)
MCH RBC QN AUTO: 27 PG (ref 26.6–33)
MCHC RBC AUTO-ENTMCNC: 31 G/DL (ref 31.5–35.7)
MCV RBC AUTO: 87.3 FL (ref 79–97)
MONOCYTES # BLD AUTO: 0.46 10*3/MM3 (ref 0.1–0.9)
MONOCYTES NFR BLD AUTO: 4.3 % (ref 5–12)
NEUTROPHILS # BLD AUTO: 5.72 10*3/MM3 (ref 1.7–7)
NEUTROPHILS NFR BLD AUTO: 53.1 % (ref 42.7–76)
NRBC BLD AUTO-RTO: 0 /100 WBC (ref 0–0.2)
PLATELET # BLD AUTO: 467 10*3/MM3 (ref 140–450)
PMV BLD AUTO: 8.6 FL (ref 6–12)
RBC # BLD AUTO: 4.66 10*6/MM3 (ref 3.77–5.28)
WBC NRBC COR # BLD: 10.76 10*3/MM3 (ref 3.4–10.8)

## 2020-05-15 PROCEDURE — 99284 EMERGENCY DEPT VISIT MOD MDM: CPT

## 2020-05-15 PROCEDURE — 87205 SMEAR GRAM STAIN: CPT | Performed by: PHYSICIAN ASSISTANT

## 2020-05-15 PROCEDURE — 87070 CULTURE OTHR SPECIMN AEROBIC: CPT | Performed by: PHYSICIAN ASSISTANT

## 2020-05-15 PROCEDURE — 25010000002 KETOROLAC TROMETHAMINE PER 15 MG: Performed by: PHYSICIAN ASSISTANT

## 2020-05-15 PROCEDURE — 96374 THER/PROPH/DIAG INJ IV PUSH: CPT

## 2020-05-15 PROCEDURE — 96375 TX/PRO/DX INJ NEW DRUG ADDON: CPT

## 2020-05-15 PROCEDURE — 25010000002 PROMETHAZINE PER 50 MG: Performed by: PHYSICIAN ASSISTANT

## 2020-05-15 PROCEDURE — 85025 COMPLETE CBC W/AUTO DIFF WBC: CPT | Performed by: PHYSICIAN ASSISTANT

## 2020-05-15 PROCEDURE — 25010000002 METHYLPREDNISOLONE PER 125 MG: Performed by: PHYSICIAN ASSISTANT

## 2020-05-15 PROCEDURE — 87147 CULTURE TYPE IMMUNOLOGIC: CPT | Performed by: PHYSICIAN ASSISTANT

## 2020-05-15 PROCEDURE — 80048 BASIC METABOLIC PNL TOTAL CA: CPT | Performed by: PHYSICIAN ASSISTANT

## 2020-05-15 PROCEDURE — 87186 SC STD MICRODIL/AGAR DIL: CPT | Performed by: PHYSICIAN ASSISTANT

## 2020-05-15 RX ORDER — METHYLPREDNISOLONE SODIUM SUCCINATE 125 MG/2ML
125 INJECTION, POWDER, LYOPHILIZED, FOR SOLUTION INTRAMUSCULAR; INTRAVENOUS ONCE
Status: COMPLETED | OUTPATIENT
Start: 2020-05-15 | End: 2020-05-15

## 2020-05-15 RX ORDER — PROMETHAZINE HYDROCHLORIDE 25 MG/ML
12.5 INJECTION, SOLUTION INTRAMUSCULAR; INTRAVENOUS ONCE
Status: COMPLETED | OUTPATIENT
Start: 2020-05-15 | End: 2020-05-15

## 2020-05-15 RX ORDER — KETOROLAC TROMETHAMINE 30 MG/ML
30 INJECTION, SOLUTION INTRAMUSCULAR; INTRAVENOUS ONCE
Status: COMPLETED | OUTPATIENT
Start: 2020-05-15 | End: 2020-05-15

## 2020-05-15 RX ORDER — SODIUM CHLORIDE 0.9 % (FLUSH) 0.9 %
10 SYRINGE (ML) INJECTION AS NEEDED
Status: DISCONTINUED | OUTPATIENT
Start: 2020-05-15 | End: 2020-05-16 | Stop reason: HOSPADM

## 2020-05-15 RX ADMIN — PROMETHAZINE HYDROCHLORIDE 12.5 MG: 25 INJECTION INTRAMUSCULAR; INTRAVENOUS at 23:52

## 2020-05-15 RX ADMIN — SODIUM CHLORIDE 1000 ML: 9 INJECTION, SOLUTION INTRAVENOUS at 23:35

## 2020-05-15 RX ADMIN — KETOROLAC TROMETHAMINE 30 MG: 30 INJECTION, SOLUTION INTRAMUSCULAR at 23:56

## 2020-05-15 RX ADMIN — METHYLPREDNISOLONE SODIUM SUCCINATE 125 MG: 125 INJECTION, POWDER, FOR SOLUTION INTRAMUSCULAR; INTRAVENOUS at 23:35

## 2020-05-16 VITALS
TEMPERATURE: 98.5 F | DIASTOLIC BLOOD PRESSURE: 78 MMHG | BODY MASS INDEX: 24.62 KG/M2 | SYSTOLIC BLOOD PRESSURE: 114 MMHG | WEIGHT: 125.4 LBS | RESPIRATION RATE: 16 BRPM | OXYGEN SATURATION: 100 % | HEART RATE: 99 BPM | HEIGHT: 60 IN

## 2020-05-16 LAB
ANION GAP SERPL CALCULATED.3IONS-SCNC: 15.5 MMOL/L (ref 5–15)
BUN BLD-MCNC: 6 MG/DL (ref 6–20)
BUN/CREAT SERPL: 9.7 (ref 7–25)
CALCIUM SPEC-SCNC: 9.9 MG/DL (ref 8.6–10.5)
CHLORIDE SERPL-SCNC: 101 MMOL/L (ref 98–107)
CO2 SERPL-SCNC: 26.5 MMOL/L (ref 22–29)
CREAT BLD-MCNC: 0.62 MG/DL (ref 0.57–1)
GFR SERPL CREATININE-BSD FRML MDRD: 115 ML/MIN/1.73
GLUCOSE BLD-MCNC: 60 MG/DL (ref 65–99)
POTASSIUM BLD-SCNC: 4.2 MMOL/L (ref 3.5–5.2)
SODIUM BLD-SCNC: 143 MMOL/L (ref 136–145)

## 2020-05-16 RX ORDER — SULFAMETHOXAZOLE AND TRIMETHOPRIM 800; 160 MG/1; MG/1
1 TABLET ORAL 2 TIMES DAILY
Qty: 20 TABLET | Refills: 0 | Status: SHIPPED | OUTPATIENT
Start: 2020-05-16 | End: 2020-05-16 | Stop reason: SDUPTHER

## 2020-05-16 RX ORDER — SULFAMETHOXAZOLE AND TRIMETHOPRIM 800; 160 MG/1; MG/1
1 TABLET ORAL 2 TIMES DAILY
Qty: 20 TABLET | Refills: 0 | Status: SHIPPED | OUTPATIENT
Start: 2020-05-16 | End: 2020-05-26

## 2020-05-16 RX ORDER — PROMETHAZINE HYDROCHLORIDE 12.5 MG/1
12.5 TABLET ORAL EVERY 6 HOURS PRN
Qty: 20 TABLET | Refills: 0 | Status: SHIPPED | OUTPATIENT
Start: 2020-05-16 | End: 2022-10-21

## 2020-05-16 RX ORDER — SULFAMETHOXAZOLE AND TRIMETHOPRIM 800; 160 MG/1; MG/1
1 TABLET ORAL ONCE
Status: COMPLETED | OUTPATIENT
Start: 2020-05-16 | End: 2020-05-16

## 2020-05-16 RX ADMIN — SULFAMETHOXAZOLE AND TRIMETHOPRIM 160 MG: 800; 160 TABLET ORAL at 00:20

## 2020-05-16 NOTE — ED PROVIDER NOTES
Subjective   This patient has had a history of a little over a month of cellulitis and open wounds to her bilateral lower extremities.  She was seen at an outlying ER and had blood work drawn and a wound culture and was given clindamycin.  She states the wounds to her right lower extremity are improving however the 1 to her left lower anterior shin is not healing and is weeping serosanguineous fluid.  No fever chills.  No history of diabetes.  She used to be on a immune suppressant injection every 8 weeks but has not had one for about 8 weeks due to this.  She has a physician she follows with in [place] who treats her Crohn's who is aware of this condition but she has not been able to follow-up with them yet due to the COVID-19 pandemic.  She is not currently on any steroid treatment.          Review of Systems   Constitutional: Negative.    HENT: Negative.    Eyes: Negative.    Respiratory: Negative.    Cardiovascular: Negative.    Gastrointestinal: Negative.    Genitourinary: Negative.    Musculoskeletal: Negative.    Skin:        Wounds in various stages of healing of the bilateral lower extremities.  Also have few to her abdomen and arms but very small no evidence of infection.   Neurological: Negative.    Psychiatric/Behavioral: Negative.        Past Medical History:   Diagnosis Date   • Anemia    • Anxiety    • Arthritis    • Autoimmune disease (CMS/HCC)     chrohns related.    • Crohn's disease (CMS/HCC)    • Fibromyalgia        Allergies   Allergen Reactions   • Codeine      Tylenol 3.   • Ibuprofen Diarrhea and Nausea And Vomiting     Contraindicated due to Crohn's Disease    • Naproxen Unknown (See Comments)   • Ondansetron Hcl Unknown (See Comments)     Makes patient sicker, with nausea and vomiting   • Tramadol Unknown (See Comments)       Past Surgical History:   Procedure Laterality Date   • COLONOSCOPY     • URACHUS EXCISION      urachus rupture as a baby       Family History   Problem Relation Age  of Onset   • Coronary artery disease Mother    • Heart attack Mother    • Hypertension Mother    • Hypertension Brother        Social History     Socioeconomic History   • Marital status: Single     Spouse name: Not on file   • Number of children: Not on file   • Years of education: Not on file   • Highest education level: Not on file   Tobacco Use   • Smoking status: Current Every Day Smoker     Packs/day: 0.50     Types: Cigarettes   • Smokeless tobacco: Never Used   Substance and Sexual Activity   • Alcohol use: No   • Drug use: Yes     Types: Heroin     Comment: Pt on subtex   • Sexual activity: Yes     Partners: Male           Objective   Physical Exam   Constitutional: She appears well-developed and well-nourished.   HENT:   Head: Normocephalic and atraumatic.   Neck: Normal range of motion.   Cardiovascular: Regular rhythm.   Tachycardic   Pulmonary/Chest: Effort normal.   Abdominal: Soft. There is no tenderness.   Skin:   Numerous wounds in various daily she has of healing to the bilateral lower extremities.  The right lower extremity has several wounds approximately 5 cm x 5 cm without any evidence of open sores or weeping except for 1 to the right lateral lower extremity that has some granulation and a open wound bed with some serous fluid.  Left lower anterior shin has a very large approximately 10 by centimeter healing wound with the lowest aspect of it open with a granulating tissue bed and some serosanguineous discharge/drainage.   Psychiatric: She has a normal mood and affect. Her behavior is normal. Thought content normal.   Nursing note and vitals reviewed.      Procedures           ED Course  ED Course as of May 16 0008   Fri May 15, 2020   2343 WBC: 10.76 [TM]   Sat May 16, 2020   0001 Glucose(!): 60 [TM]   0001 BUN: 6 [TM]   0001 Creatinine: 0.62 [TM]   0002 Anion Gap(!): 15.5 [TM]      ED Course User Index  [TM] Bo Simon PA-C                                            MDM    Final diagnoses:   Non-healing wound of lower extremity, left, initial encounter            Bo Simon PA-C  05/16/20 0008

## 2020-05-17 ASSESSMENT — ENCOUNTER SYMPTOMS
SHORTNESS OF BREATH: 0
VOMITING: 0
EYE PAIN: 0
SORE THROAT: 0
NAUSEA: 0
ABDOMINAL PAIN: 0
COUGH: 0

## 2020-05-18 LAB
BACTERIA SPEC AEROBE CULT: ABNORMAL
GRAM STN SPEC: ABNORMAL
GRAM STN SPEC: ABNORMAL

## 2020-06-23 RX ORDER — BACLOFEN 10 MG/1
TABLET ORAL
Qty: 60 TABLET | Refills: 1 | Status: SHIPPED | OUTPATIENT
Start: 2020-06-23 | End: 2022-05-27

## 2020-07-13 ENCOUNTER — TELEPHONE (OUTPATIENT)
Dept: PRIMARY CARE CLINIC | Age: 30
End: 2020-07-13

## 2020-07-13 RX ORDER — GABAPENTIN 600 MG/1
600 TABLET ORAL 4 TIMES DAILY
Qty: 120 TABLET | Refills: 0 | Status: SHIPPED | OUTPATIENT
Start: 2020-07-13 | End: 2020-09-14 | Stop reason: SDUPTHER

## 2020-07-13 RX ORDER — GABAPENTIN 600 MG/1
600 TABLET ORAL 4 TIMES DAILY
Qty: 120 TABLET | Refills: 0 | Status: CANCELLED | OUTPATIENT
Start: 2020-07-13 | End: 2020-08-12

## 2020-07-13 NOTE — TELEPHONE ENCOUNTER
Patient needs to come by the office to  the rx. We will need a UDS at that time.   She is out 2 days either that is 8 pills short

## 2020-07-13 NOTE — TELEPHONE ENCOUNTER
Patient called requesting a refill on Gabapentin. Did schedule patient an appt for Wednesday but states she will be out tomorrow. She is asking if refill can be called in before her appt. Medication pended.

## 2020-07-14 NOTE — TELEPHONE ENCOUNTER
No answer or way to leave message. Please inform pt she has to come  her medication at office. DO NOT TELL HER SHE HAS TO DO UDS UNTIL SHE IS AT OFFICE.

## 2020-07-15 ENCOUNTER — VIRTUAL VISIT (OUTPATIENT)
Dept: PRIMARY CARE CLINIC | Age: 30
End: 2020-07-15
Payer: MEDICAID

## 2020-07-15 PROCEDURE — 99213 OFFICE O/P EST LOW 20 MIN: CPT | Performed by: PEDIATRICS

## 2020-07-15 RX ORDER — TRIAMCINOLONE ACETONIDE 1 MG/G
CREAM TOPICAL
Qty: 80 G | Refills: 3 | Status: SHIPPED | OUTPATIENT
Start: 2020-07-15 | End: 2021-02-24 | Stop reason: ALTCHOICE

## 2020-07-15 ASSESSMENT — ENCOUNTER SYMPTOMS
NAUSEA: 0
SORE THROAT: 0
EYE DISCHARGE: 0
BACK PAIN: 0
ABDOMINAL PAIN: 0
COUGH: 0
VOMITING: 0
SHORTNESS OF BREATH: 0
SINUS PRESSURE: 0
WHEEZING: 0

## 2020-07-15 NOTE — PROGRESS NOTES
daily to lower leg skin ulcers Yes Tea Ibrahim, DO   triamcinolone (KENALOG) 0.1 % cream Apply topically 2 times daily. Yes [address] [place] [address], DO   gabapentin (NEURONTIN) 600 MG tablet Take 1 tablet by mouth 4 times daily for 30 days. Yes Tea Ibrahim, DO   baclofen (LIORESAL) 10 MG tablet TAKE ONE TABLET BY MOUTH TWO TIMES A DAY AS NEEDED FOR MUSCLE SPASMS Yes [address] [place] [address], DO   buprenorphine-naloxone (SUBOXONE) 8-2 MG FILM SL film Place 1 Film under the tongue daily.  Yes Historical Provider, MD   ustekinumab (STELARA) 90 MG/ML SOSY prefilled syringe Inject 90 mg into the skin once Every 8 weeks Yes Historical Provider, MD       Social History     Tobacco Use    Smoking status: Current Every Day Smoker     Packs/day: 1.00     Years: 5.00     Pack years: 5.00     Types: Cigarettes    Smokeless tobacco: Never Used   Substance Use Topics    Alcohol use: Yes     Comment: occasional    Drug use: Not Currently        Allergies   Allergen Reactions    Humira [Adalimumab]     Ibuprofen      Contraindicated due to Crohn's Disease     Naproxen     Tramadol     Tylenol With Codeine #3 [Acetaminophen-Codeine] Other (See Comments)    Zofran [Ondansetron Hcl]      Makes patient sicker, with nausea and vomiting   ,   Past Medical History:   Diagnosis Date    Arthritis     [facility]'s [facility] ([facility] [facility]     Fibromyalgia     Fibromyalgia     Immune deficiency disorder ([facility] [facility]    ,   Past Surgical History:   Procedure Laterality Date    AV FISTULA REPAIR  1992    COLONOSCOPY  elliott 10, 2015       PHYSICAL EXAMINATION:  [ INSTRUCTIONS:  \"[x]\" Indicates a positive item  \"[]\" Indicates a negative item  -- DELETE ALL ITEMS NOT EXAMINED]  Vital Signs: (As obtained by patient/caregiver or practitioner observation)    Blood pressure-  Heart rate-    Respiratory rate-    Temperature-  Pulse oximetry-     Constitutional: [] Appears well-developed and well-nourished [] No apparent distress      [] Abnormal-   Mental status  [] Alert and awake  [] Oriented to person/place/time []Able to follow commands      Eyes:  EOM    []  Normal  [] Abnormal-  Sclera  []  Normal  [] Abnormal -         Discharge []  None visible  [] Abnormal -    HENT:   [] Normocephalic, atraumatic. [] Abnormal   [] Mouth/Throat: Mucous membranes are moist.     External Ears [] Normal  [] Abnormal-     Neck: [] No visualized mass     Pulmonary/Chest: [] Respiratory effort normal.  [] No visualized signs of difficulty breathing or respiratory distress        [] Abnormal-      Musculoskeletal:   [] Normal gait with no signs of ataxia         [] Normal range of motion of neck        [] Abnormal-       Neurological:        [] No Facial Asymmetry (Cranial nerve 7 motor function) (limited exam to video visit)          [] No gaze palsy        [] Abnormal-         Skin:        [] No significant exanthematous lesions or discoloration noted on facial skin         [] Abnormal-            Psychiatric:       [] Normal Affect [] No Hallucinations        [] Abnormal-     Other pertinent observable physical exam findings-     ASSESSMENT/PLAN:  1. Rash and nonspecific skin eruption    - silver sulfADIAZINE (SILVADENE) 1 % cream; Apply topically daily to lower leg skin ulcers  Dispense: 400 g; Refill: 0  - triamcinolone (KENALOG) 0.1 % cream; Apply topically 2 times daily. Dispense: 80 g; Refill: 3  The rash may have autoimmune nature - patient does have diagnosed crohn's disease. Recommend starting back the stelara as soon as the open wound has healed on the leg. Return if symptoms worsen or fail to improve. Xenia Cummings is a 31 y.o. female being evaluated by a Virtual Visit (video visit) encounter to address concerns as mentioned above. A caregiver was present when appropriate.  Due to this being a TeleHealth encounter (During SGRIZ-72 public health emergency), evaluation of the following organ systems was limited: Vitals/Constitutional/EENT/Resp/CV/GI//MS/Neuro/Skin/Heme-Lymph-Imm. Pursuant to the emergency declaration under the [address] [place] [address], [address] [place] [address] and the [employer] Act, this Virtual Visit was conducted with patient's (and/or legal guardian's) consent, to reduce the patient's risk of exposure to COVID-19 and provide necessary medical care. The patient (and/or legal guardian) has also been advised to contact this office for worsening conditions or problems, and seek emergency medical treatment and/or call 911 if deemed necessary. Patient identification was verified at the start of the visit: Yes    Total time spent on this encounter: 15    Services were provided through a video synchronous discussion virtually to substitute for in-person clinic visit. Patient and provider were located at their individual homes. --Marylou De Souza DO on 7/15/2020 at 11:18 AM    An electronic signature was used to authenticate this note.

## 2020-08-12 NOTE — TELEPHONE ENCOUNTER
Cadence Marcano is calling for a medication refill that is not listed in her chart. Requesting antibiotic and another med for leg    Last office visit: 7/15/2020  Next office visit: Visit date not found   Preferred Pharmacy:     Please call patient to clarify. [facility]    Thank You.

## 2020-09-05 ENCOUNTER — HOSPITAL ENCOUNTER (EMERGENCY)
Facility: HOSPITAL | Age: 30
Discharge: HOME | End: 2020-09-06
Attending: FAMILY MEDICINE
Payer: MEDICAID

## 2020-09-05 VITALS
BODY MASS INDEX: 22.58 KG/M2 | SYSTOLIC BLOOD PRESSURE: 114 MMHG | HEIGHT: 60 IN | WEIGHT: 115 LBS | DIASTOLIC BLOOD PRESSURE: 81 MMHG | TEMPERATURE: 98.4 F | RESPIRATION RATE: 16 BRPM

## 2020-09-05 PROCEDURE — 99282 EMERGENCY DEPT VISIT SF MDM: CPT

## 2020-09-05 PROCEDURE — 87186 SC STD MICRODIL/AGAR DIL: CPT

## 2020-09-05 PROCEDURE — 87070 CULTURE OTHR SPECIMN AEROBIC: CPT

## 2020-09-05 PROCEDURE — 87077 CULTURE AEROBIC IDENTIFY: CPT

## 2020-09-05 PROCEDURE — 99283 EMERGENCY DEPT VISIT LOW MDM: CPT

## 2020-09-05 PROCEDURE — 87205 SMEAR GRAM STAIN: CPT

## 2020-09-05 RX ORDER — PROMETHAZINE HYDROCHLORIDE 25 MG/ML
12.5 INJECTION, SOLUTION INTRAMUSCULAR; INTRAVENOUS ONCE
Status: DISCONTINUED | OUTPATIENT
Start: 2020-09-05 | End: 2020-09-05

## 2020-09-05 RX ORDER — METHYLPREDNISOLONE SODIUM SUCCINATE 125 MG/2ML
125 INJECTION, POWDER, LYOPHILIZED, FOR SOLUTION INTRAMUSCULAR; INTRAVENOUS ONCE
Status: DISCONTINUED | OUTPATIENT
Start: 2020-09-05 | End: 2020-09-05

## 2020-09-05 ASSESSMENT — PAIN SCALES - GENERAL: PAINLEVEL_OUTOF10: 10

## 2020-09-06 PROCEDURE — 6370000000 HC RX 637 (ALT 250 FOR IP)

## 2020-09-06 PROCEDURE — 6370000000 HC RX 637 (ALT 250 FOR IP): Performed by: FAMILY MEDICINE

## 2020-09-06 RX ORDER — OXYCODONE AND ACETAMINOPHEN 10; 325 MG/1; MG/1
1 TABLET ORAL ONCE
Status: COMPLETED | OUTPATIENT
Start: 2020-09-06 | End: 2020-09-06

## 2020-09-06 RX ORDER — LEVOFLOXACIN 750 MG/1
750 TABLET ORAL DAILY
Qty: 10 TABLET | Refills: 0 | Status: SHIPPED | OUTPATIENT
Start: 2020-09-06 | End: 2020-09-16

## 2020-09-06 RX ORDER — PREDNISONE 50 MG/1
50 TABLET ORAL ONCE
Status: COMPLETED | OUTPATIENT
Start: 2020-09-06 | End: 2020-09-06

## 2020-09-06 RX ORDER — PREDNISONE 20 MG/1
TABLET ORAL
Qty: 18 TABLET | Refills: 0 | Status: SHIPPED | OUTPATIENT
Start: 2020-09-06 | End: 2020-10-16

## 2020-09-06 RX ORDER — LEVOFLOXACIN 750 MG/1
750 TABLET ORAL ONCE
Status: COMPLETED | OUTPATIENT
Start: 2020-09-06 | End: 2020-09-06

## 2020-09-06 RX ADMIN — MUPIROCIN: 20 OINTMENT TOPICAL at 00:19

## 2020-09-06 RX ADMIN — PREDNISONE 50 MG: 50 TABLET ORAL at 00:15

## 2020-09-06 RX ADMIN — Medication: at 00:19

## 2020-09-06 RX ADMIN — OXYCODONE HYDROCHLORIDE AND ACETAMINOPHEN 1 TABLET: 10; 325 TABLET ORAL at 00:15

## 2020-09-06 RX ADMIN — LEVOFLOXACIN 750 MG: 750 TABLET, FILM COATED ORAL at 00:16

## 2020-09-06 ASSESSMENT — ENCOUNTER SYMPTOMS: COLOR CHANGE: 1

## 2020-09-06 NOTE — ED PROVIDER NOTES
[address] [place] [address] Court  eMERGENCY dEPARTMENT eNCOUnter      Pt Name: Amie Adam  MRN: [id number]  [place] 1990  Date of evaluation: 9/5/2020  Provider: Celeste England DO    CHIEF COMPLAINT       Chief Complaint   Patient presents with    Other     C/o sores on bilat. legs,onset 1 yr. HISTORY OF PRESENT ILLNESS   (Location/Symptom, Timing/Onset, Context/Setting, Quality, Duration, Modifying Factors, Severity)  Note limiting factors. Amie Adam is a 31 y.o. female who presents to the emergency department for having chronic wounds to her lower extremities. Pt states that they have been going on for the past 1 year. Only from below the knees down. She says that they come up and then open up and start draining serosanguinous material and been bleeding all over the place. Pt hasn't been to see specialist or any dermatologic clinic and has tried to get PCP to send her. Pt says that the wounds are killing her and she is trying to dress them every day. She only has a stand up shower and hasn't tried to use Epson salt. Pt had wound cultured the last time showing MRSA in the wound. Pt doesn't have any cellulitic or gross yellow exudate or drainage from the wounds. She has history of crohn's disorder. Pt with some immunodeficiency disorder but unsure what but assume it is from crohns. Pt unable to get the wounds to heal      Nursing Notes were reviewed. REVIEW OF SYSTEMS    (2-9 systems for level 4, 10 or more forlevel 5)     Review of Systems   Skin: Positive for color change and wound. All other systems reviewed and are negative.           PAST MEDICAL HISTORY     Past Medical History:   Diagnosis Date    Arthritis     Cro[facility]'s disease in remission ([facility]     Fibromyalgia     Fibromyalgia     Immune deficiency disorder ([facility]          SURGICAL HISTORY       Past Surgical History:   Procedure Laterality Date    [facility] COLONOSCOPY  elliott 10, 2015 CURRENT MEDICATIONS       Discharge Medication List as of 9/6/2020 12:49 AM      CONTINUE these medications which have NOT CHANGED    Details   silver sulfADIAZINE (SILVADENE) 1 % cream Apply topically daily to lower leg skin ulcers, Disp-400 g,R-0, Normal      triamcinolone (KENALOG) 0.1 % cream Apply topically 2 times daily. , Disp-80 g,R-3, Normal      gabapentin (NEURONTIN) 600 MG tablet Take 1 tablet by mouth 4 times daily for 30 days. , Disp-120 tablet,R-0Print      baclofen (LIORESAL) 10 MG tablet TAKE ONE TABLET BY MOUTH TWO TIMES A DAY AS NEEDED FOR MUSCLE SPASMS, Disp-60 tablet,R-1Normal      buprenorphine-naloxone (SUBOXONE) 8-2 MG FILM SL film Place 1 Film under the tongue daily. Historical Med      ustekinumab (STELARA) 90 MG/ML SOSY prefilled syringe Inject 90 mg into the skin once Every 8 weeksHistorical Med             ALLERGIES     Humira [adalimumab];  Ibuprofen; Naproxen; Tramadol; Tylenol with codeine #3 [acetaminophen-codeine]; and Zofran [ondansetron hcl]    FAMILY HISTORY       Family History   Problem Relation Age of Onset    Heart Disease Mother           SOCIAL HISTORY       Social History     Socioeconomic History    Marital status: Single     Spouse name: Not on file    Number of children: Not on file    Years of education: Not on file    Highest education level: Not on file   Occupational History    Not on file   Social Needs    Financial resource strain: Not on file    Food insecurity     Worry: Not on file     Inability: Not on file    Transportation needs     Medical: Not on file     Non-medical: Not on file   Tobacco Use    Smoking status: Current Every Day Smoker     Packs/day: 1.00     Years: 5.00     Pack years: 5.00     Types: Cigarettes    Smokeless tobacco: Never Used   Substance and Sexual Activity    Alcohol use: Yes     Comment: occasional    Drug use: Not Currently    Sexual activity: Yes     Partners: Male   Lifestyle    Physical activity     Days per week: Not on file     Minutes per session: Not on file    Stress: Not on file   Relationships    Social connections     Talks on phone: Not on file     Gets together: Not on file     Attends Orthodoxy service: Not on file     Active member of club or organization: Not on file     Attends meetings of clubs or organizations: Not on file     Relationship status: Not on file    Intimate partner violence     Fear of current or ex partner: Not on file     Emotionally abused: Not on file     Physically abused: Not on file     Forced sexual activity: Not on file   Other Topics Concern    Not on file   Social History Narrative    Not on file       SCREENINGS             PHYSICAL EXAM    (up to 7 for level 4, 8 or more for level 5)     ED Triage Vitals [09/05/20 2147]   BP Temp Temp Source Pulse Resp SpO2 Height Weight   123/77 98.4 °F (36.9 °C) Oral -- 16 -- 5' (1.524 m) 115 lb (52.2 kg)       Physical Exam  Vitals signs and nursing note reviewed. Constitutional:       Comments: Thin female; Not ill appearing. Pt complaining of pain to her legs and crying at times. HENT:      Head: Normocephalic. Eyes:      Extraocular Movements: Extraocular movements intact. Conjunctiva/sclera: Conjunctivae normal.      Pupils: Pupils are equal, round, and reactive to light. Neck:      Musculoskeletal: Neck supple. Cardiovascular:      Rate and Rhythm: Normal rate and regular rhythm. Heart sounds: Normal heart sounds. Comments: HR 96 in room by manual check. Pulmonary:      Effort: Pulmonary effort is normal.      Breath sounds: Normal breath sounds. Musculoskeletal: Normal range of motion. Skin:     General: Skin is warm and dry. Comments: Pt with multiple right lower leg open ulcerative wounds just down through epidermis to dermis but no open deep subcutaneous tissue, no gross exudate or drainage except for serosanguinous fluid dripping at times from the right leg.  She has a 5 X 4 cm wound to the med/posterior calf area, right lower leg wound 3 cm X 2 cm and another on medial ankle that is same size. Pt with small 2 cm X 2 cm left lower outer ankle wound that is mostly scabbed but open in 1 cm area without drainage. Pt with numerous superficial healed wounds with pink rough tissue as scars noted. Wounds do not go above tibial tuberosities. Neurological:      Mental Status: She is alert and oriented to person, place, and time. Psychiatric:         Mood and Affect: Mood normal.         Behavior: Behavior normal.         DIAGNOSTIC RESULTS     EKG: All EKG's are interpreted by the Emergency Department Physician who either signs or Co-signs this chart in the absence of a cardiologist.    None    RADIOLOGY:   Non-plain film images such as CT, Ultrasound and MRI are read by the radiologist. Plain radiographic images are visualized andpreliminarily interpreted by the emergency physician with the below findings:    None    Interpretationper the Radiologist below, if available at the time of this note:    No orders to display         ED BEDSIDE ULTRASOUND:   Performed by ED Physician - none    LABS:  Labs Reviewed   CULTURE, WOUND       All other labs were within normal range or not returned as of this dictation. EMERGENCY DEPARTMENT COURSE and DIFFERENTIAL DIAGNOSIS/MDM:   Vitals:    Vitals:    09/05/20 2215 09/05/20 2230 09/05/20 2245 09/05/20 2300   BP: 114/79 118/84 118/86 114/81   Resp:       Temp:       TempSrc:       Weight:       Height:               CRITICAL CARE TIME   Total Critical Care time was 0 minutes, excluding separatelyreportable procedures. There was a high probability ofclinically significant/life threatening deterioration in the patient's condition which required my urgent intervention. CONSULTS:  Spoke with father at her authorization and told his that she likely has autoimmune disorder causing her lower extremity ulcerations.  They dont' have the typical cellulitic infectious look, drainage, no fever, been intermittently popping up for the past 1 year. Pt was seen here in April and had culture showing MRSA. Pt's father now concerned stating that he didn't want her around the children because of the MRSA. I told him the diagnosis of the culture was from April; He would like her to go to her mother's. Mother called and said she didn't want her coming home and that she just needs to stay there until tomorrow or send her to [facility]. I told her that the wounds aren't signs of major infection, no sepsis, been like that for almost a year and improved from last time I saw her. She said that she will not go to the doctor and loss her GI specialist cause she missed appt's. I told her the diagnosis was from April with MRSA and she has been around everyone since then. Father has custody of her children. Mother said that she picks at the wounds and will not let them heal. Will not go to the doctor and they have tried to help her. I called [facility] and they shut down the Dermatology clinic. I gave them Dr. Katia Staples number but It wouldn't be an emergent transfer since the wounds are superficial, no fever, she says they hurt but currently on Suboxone. I didn't think [facility] would accept her and she hasn't seen Dr. Sade Smith for a long time for crohn's. I told them that she needs to get into another doctor or be more persistent with her own to get her into a clinic. I wish I could help further but the home issue is social at this point. I have bandaged and covered the wounds with Bactroban, gave her plenty of supplies and told her how to properly work with wound care and gave her antibiotics, steroids, bactroban. PROCEDURES:  None    PROGRESS NOTES:    Nurses unable to get IV, I used ultrasound and pt has no veins. Pt said she doesn't use drugs and she had lots of IV and immunosuppressive treatment but on phone, mother said she had drug problems along with father. Not sure if she still does now.  Cancelled the labs. Wounds improved from last time seeing her 5 months ago. Unable to get IV, labs, refusing any more sticks. Will change IV meds to oral at this time. Gave her something for pain but she is on suboxone. These wounds have been present for a year and needs to get into dermatology for biopsy or PCP should perform autoimmune work up or send to surgery for biopsy. FINAL IMPRESSION      1. Multiple opens wound of lower extremity, unspecified laterality, initial encounter Stable, but not controlled   2. MRSA infection New Problem         DISPOSITION/PLAN   DISPOSITION Decision To Discharge 09/06/2020 12:15:22 AM      PATIENT REFERRED TO:    Pt to follow up with PCP about helping getting her into a Dermatology clinic. [facility] dermatology shut down.  Pt needs further outpatient work up for autoimmune skin disorders        Fadumo Matamoros, [address]  [phone]      Pt to call Dermatology clinic for appt as soon as possible      DISCHARGE MEDICATIONS:  Discharge Medication List as of 9/6/2020 12:49 AM      START taking these medications    Details   levoFLOXacin (LEVAQUIN) 750 MG tablet Take 1 tablet by mouth daily for 10 days, Disp-10 tablet,R-0Print      predniSONE (DELTASONE) 20 MG tablet Take three tabs on day 1-3, two tabs on day 4-6, one tab on day 7-10, Disp-18 tablet,R-0Print      mupirocin (BACTROBAN) 2 % ointment Apply topically 3 times daily to open wound areas, Disp-22 g,R-0, Print             (Please note that portions of this note were completed with a voice recognition program.  Efforts were made to edit the dictations but occasionallywords are mis-transcribed.)    Papi Gonzalez DO (electronically signed)  Attending Emergency Physician          Papi Gonzalez DO  09/06/20 0100

## 2020-09-06 NOTE — ED NOTES
Discharge instructions reviewed and medications discussed with verbalized understanding from patient. Patient had no further questions or concerns.         Bre Diaz RN  09/06/20 0451

## 2020-09-08 LAB
GRAM STAIN RESULT: ABNORMAL
ORGANISM: ABNORMAL
WOUND/ABSCESS: ABNORMAL

## 2020-09-14 RX ORDER — GABAPENTIN 600 MG/1
600 TABLET ORAL 4 TIMES DAILY
Qty: 120 TABLET | Refills: 0 | Status: SHIPPED | OUTPATIENT
Start: 2020-09-14 | End: 2021-02-24

## 2020-10-16 ENCOUNTER — OFFICE VISIT (OUTPATIENT)
Dept: PRIMARY CARE CLINIC | Age: 30
End: 2020-10-16
Payer: MEDICAID

## 2020-10-16 ENCOUNTER — TELEPHONE (OUTPATIENT)
Dept: PRIMARY CARE CLINIC | Age: 30
End: 2020-10-16

## 2020-10-16 PROCEDURE — 99213 OFFICE O/P EST LOW 20 MIN: CPT | Performed by: NURSE PRACTITIONER

## 2020-10-16 RX ORDER — PREDNISONE 20 MG/1
20 TABLET ORAL 2 TIMES DAILY
Qty: 20 TABLET | Refills: 1 | Status: SHIPPED | OUTPATIENT
Start: 2020-10-16 | End: 2020-10-26

## 2020-10-16 RX ORDER — CEPHALEXIN 500 MG/1
500 CAPSULE ORAL 2 TIMES DAILY
Qty: 20 CAPSULE | Refills: 0 | Status: SHIPPED | OUTPATIENT
Start: 2020-10-16 | End: 2020-10-26

## 2020-10-16 RX ORDER — GABAPENTIN 600 MG/1
600 TABLET ORAL DAILY
Qty: 90 TABLET | Refills: 0 | Status: SHIPPED | OUTPATIENT
Start: 2020-10-16 | End: 2021-02-24

## 2020-10-16 ASSESSMENT — ENCOUNTER SYMPTOMS
RECTAL PAIN: 0
EYES NEGATIVE: 1
ABDOMINAL DISTENTION: 0
ABDOMINAL PAIN: 0
VOMITING: 0
NAUSEA: 0
RESPIRATORY NEGATIVE: 1
DIARRHEA: 1

## 2020-10-16 NOTE — TELEPHONE ENCOUNTER
Patient called and she didn't receive the gabapentin at the pharmacy. It looks like it was sent but the pharmacy didn't confirm they received it? May need to be resent? Patient received her other meds.

## 2020-10-16 NOTE — PROGRESS NOTES
This is a 31 y.o. female who presents with   Chief Complaint   Patient presents with    Leg Pain     sores        SUBJECTIVE:     long history of drug abuse,  In treatment on suboxone. Long history of possible jasmyn's and skin condition ? Auto immune/  Needs meds refilled and help getting dressings. Out of neurontin        Current Outpatient Medications   Medication Sig Dispense Refill    gabapentin (NEURONTIN) 600 MG tablet Take 1 tablet by mouth daily for 30 days. 90 tablet 0    predniSONE (DELTASONE) 20 MG tablet Take 1 tablet by mouth 2 times daily for 10 days Take three tabs on day 1-3, two tabs on day 4-6, one tab on day 7-10 20 tablet 1    silver sulfADIAZINE (SILVADENE) 1 % cream Apply topically daily to lower leg skin ulcers 400 g 1    mupirocin (BACTROBAN) 2 % ointment Apply 3 times daily. 1 Tube 1    cephALEXin (KEFLEX) 500 MG capsule Take 1 capsule by mouth 2 times daily for 10 days 20 capsule 0    gabapentin (NEURONTIN) 600 MG tablet Take 1 tablet by mouth 4 times daily for 30 days. 120 tablet 0    triamcinolone (KENALOG) 0.1 % cream Apply topically 2 times daily. 80 g 3    baclofen (LIORESAL) 10 MG tablet TAKE ONE TABLET BY MOUTH TWO TIMES A DAY AS NEEDED FOR MUSCLE SPASMS 60 tablet 1    buprenorphine-naloxone (SUBOXONE) 8-2 MG FILM SL film Place 1 Film under the tongue daily.  ustekinumab (STELARA) 90 MG/ML SOSY prefilled syringe Inject 90 mg into the skin once Every 8 weeks       No current facility-administered medications for this visit. Allergies   Allergen Reactions    Humira [Adalimumab]     Ibuprofen      Contraindicated due to Crohn's Disease     Naproxen     Tramadol     Tylenol With Codeine #3 [Acetaminophen-Codeine] Other (See Comments)    Zofran [Ondansetron Hcl]      Makes patient sicker, with nausea and vomiting       Review of Systems   Constitutional: Positive for activity change and fatigue.  Negative for appetite change, diaphoresis, fever and unexpected weight change. HENT: Negative for congestion. Eyes: Negative. Respiratory: Negative. Cardiovascular: Negative. Gastrointestinal: Positive for diarrhea. Negative for abdominal distention, abdominal pain, nausea, rectal pain and vomiting. Endocrine: Negative for cold intolerance, heat intolerance and polydipsia. Genitourinary: Negative for difficulty urinating, flank pain, urgency and vaginal pain. Musculoskeletal: Positive for arthralgias, gait problem and myalgias. Negative for joint swelling and neck stiffness. Skin: Positive for rash. Chronic rash to lower legs and some skin break down ,  Worse on right then left leg at this time. Hematological: Negative. Psychiatric/Behavioral: Negative for behavioral problems, confusion, decreased concentration, self-injury and suicidal ideas. OBJECTIVE:     There were no vitals taken for this visit. Physical Exam  Vitals signs and nursing note reviewed. Constitutional:       General: She is not in acute distress. Appearance: Normal appearance. She is not ill-appearing. HENT:      Head: Normocephalic. Mouth/Throat:      Mouth: Mucous membranes are moist.   Eyes:      Pupils: Pupils are equal, round, and reactive to light. Neck:      Musculoskeletal: Normal range of motion. Cardiovascular:      Rate and Rhythm: Tachycardia present. Pulses: Normal pulses. Heart sounds: Normal heart sounds. Pulmonary:      Effort: No respiratory distress. Breath sounds: Normal breath sounds. No stridor. No rhonchi. Abdominal:      General: Abdomen is flat. There is no distension. Palpations: Abdomen is soft. Tenderness: There is no abdominal tenderness. There is no guarding. Musculoskeletal:         General: Tenderness present. Comments: Both lower extremities  With chronic skin changes and scabbing. Skin:     General: Skin is warm. Findings: Erythema, lesion and rash present. Comments: Right leg dressed. Left leg old scaring  Lateral ankle small area of skin breakdown,  Right leg dressed. Neurological:      General: No focal deficit present. Mental Status: She is alert and oriented to person, place, and time. Psychiatric:         Mood and Affect: Mood normal.         Behavior: Behavior normal.         Thought Content: Thought content normal.         Judgment: Judgment normal.         Orders Placed This Encounter   Medications    gabapentin (NEURONTIN) 600 MG tablet     Sig: Take 1 tablet by mouth daily for 30 days. Dispense:  90 tablet     Refill:  0    predniSONE (DELTASONE) 20 MG tablet     Sig: Take 1 tablet by mouth 2 times daily for 10 days Take three tabs on day 1-3, two tabs on day 4-6, one tab on day 7-10     Dispense:  20 tablet     Refill:  1    silver sulfADIAZINE (SILVADENE) 1 % cream     Sig: Apply topically daily to lower leg skin ulcers     Dispense:  400 g     Refill:  1    mupirocin (BACTROBAN) 2 % ointment     Sig: Apply 3 times daily. Dispense:  1 Tube     Refill:  1    cephALEXin (KEFLEX) 500 MG capsule     Sig: Take 1 capsule by mouth 2 times daily for 10 days     Dispense:  20 capsule     Refill:  0        Orders Placed This Encounter   Medications    gabapentin (NEURONTIN) 600 MG tablet     Sig: Take 1 tablet by mouth daily for 30 days. Dispense:  90 tablet     Refill:  0    predniSONE (DELTASONE) 20 MG tablet     Sig: Take 1 tablet by mouth 2 times daily for 10 days Take three tabs on day 1-3, two tabs on day 4-6, one tab on day 7-10     Dispense:  20 tablet     Refill:  1    silver sulfADIAZINE (SILVADENE) 1 % cream     Sig: Apply topically daily to lower leg skin ulcers     Dispense:  400 g     Refill:  1    mupirocin (BACTROBAN) 2 % ointment     Sig: Apply 3 times daily.      Dispense:  1 Tube     Refill:  1    cephALEXin (KEFLEX) 500 MG capsule     Sig: Take 1 capsule by mouth 2 times daily for 10 days     Dispense:  20 capsule Refill:  0       ASSESSMENT/PLAN  1. Chronic skin infection vs auto immune issues lowers legs,  2, drug abuse on suboxone. 3. Resume previous medication   4. referrals to wound clinic   5. To see PCP in clinic and to make specialist appt in [facility] as ordered for jasmyn     No follow-ups on file.

## 2020-10-23 ENCOUNTER — HOSPITAL ENCOUNTER (EMERGENCY)
Facility: HOSPITAL | Age: 30
Discharge: HOME | End: 2020-10-23
Attending: FAMILY MEDICINE
Payer: MEDICAID

## 2020-10-23 VITALS
HEIGHT: 60 IN | RESPIRATION RATE: 16 BRPM | DIASTOLIC BLOOD PRESSURE: 94 MMHG | OXYGEN SATURATION: 99 % | WEIGHT: 110 LBS | BODY MASS INDEX: 21.6 KG/M2 | HEART RATE: 102 BPM | TEMPERATURE: 98.4 F | SYSTOLIC BLOOD PRESSURE: 133 MMHG

## 2020-10-23 PROCEDURE — 2500000003 HC RX 250 WO HCPCS: Performed by: FAMILY MEDICINE

## 2020-10-23 PROCEDURE — 99283 EMERGENCY DEPT VISIT LOW MDM: CPT

## 2020-10-23 RX ORDER — PREDNISONE 20 MG/1
TABLET ORAL
Qty: 18 TABLET | Refills: 0 | Status: SHIPPED | OUTPATIENT
Start: 2020-10-23 | End: 2021-02-24 | Stop reason: ALTCHOICE

## 2020-10-23 RX ADMIN — SILVER SULFADIAZINE: 10 CREAM TOPICAL at 02:19

## 2020-10-23 ASSESSMENT — ENCOUNTER SYMPTOMS: COLOR CHANGE: 1

## 2020-10-23 ASSESSMENT — PAIN SCALES - GENERAL: PAINLEVEL_OUTOF10: 5

## 2020-10-23 NOTE — ED TRIAGE NOTES
Onset Dave.states the right leg has open ulcers. States she needs some salve. Has bandages covering right leg.

## 2020-10-23 NOTE — ED PROVIDER NOTES
[address] [place] [address] Court  eMERGENCY dEPARTMENT eNCOUnter      Pt Name: Marisel Hoffman  MRN: [id number]  [place] 1990  Date of evaluation: 10/23/2020  Provider: Yadi Snow, [address] [place] [address]       Chief Complaint   Patient presents with    Leg Pain     C/o ulcers to right leg. HISTORY OF PRESENT ILLNESS   (Location/Symptom, Timing/Onset, Context/Setting, Quality, Duration, Modifying Factors, Severity)  Note limiting factors. Marisel Hoffman is a 31 y.o. female who presents to the emergency department for having ulcers to her right > left lower leg. Pt has had ulcers to her legs for over a year. 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 She doesn't wash or take care of herself and now picked up for care home due to not showing up for court. Her legs actually look really good and minimal open skin and breakdown areas but care home wouldn't accept the pt due to risk for MRSA transfer/contact. She is 1/2 asleep in the room and seem to be on something drug wise. Pt said that they look like that because she has been washing her legs. Pt never had any wounds above his knees. Nursing Notes were reviewed. REVIEW OF SYSTEMS    (2-9 systems for level 4, 10 or more forlevel 5)     Review of Systems   Skin: Positive for color change and wound. Bilateral legs from the knees down   All other systems reviewed and are negative.           PAST MEDICAL HISTORY     Past Medical History:   Diagnosis Date    Arthritis     [facility]'s disease [facility] remission ([facility] [address] [facility]     Fibromyalgia     Fibromyalgia     Immune deficiency disorder ([facility] [address] [facility]          SURGICAL HISTORY       Past Surgical History:   Procedure Laterality Date    AV FISTULA REPAIR  1992    COLONOSCOPY  elliott 10, 2015         CURRENT MEDICATIONS       Previous Medications    BACLOFEN (LIORESAL) 10 MG TABLET    TAKE ONE TABLET BY MOUTH TWO TIMES A DAY AS NEEDED FOR MUSCLE SPASMS    BUPRENORPHINE-NALOXONE (SUBOXONE) 8-2 MG FILM SL FILM Place 1 Film under the tongue daily. CEPHALEXIN (KEFLEX) 500 MG CAPSULE    Take 1 capsule by mouth 2 times daily for 10 days    GABAPENTIN (NEURONTIN) 600 MG TABLET    Take 1 tablet by mouth 4 times daily for 30 days. GABAPENTIN (NEURONTIN) 600 MG TABLET    Take 1 tablet by mouth daily for 30 days. MUPIROCIN (BACTROBAN) 2 % OINTMENT    Apply 3 times daily. PREDNISONE (DELTASONE) 20 MG TABLET    Take 1 tablet by mouth 2 times daily for 10 days Take three tabs on day 1-3, two tabs on day 4-6, one tab on day 7-10    SILVER SULFADIAZINE (SILVADENE) 1 % CREAM    Apply topically daily to lower leg skin ulcers    TRIAMCINOLONE (KENALOG) 0.1 % CREAM    Apply topically 2 times daily. USTEKINUMAB (STELARA) 90 MG/ML SOSY PREFILLED SYRINGE    Inject 90 mg into the skin once Every 8 weeks       ALLERGIES     Humira [adalimumab];  Ibuprofen; Naproxen; Tramadol; Tylenol with codeine #3 [acetaminophen-codeine]; and Zofran [ondansetron hcl]    FAMILY HISTORY       Family History   Problem Relation Age of Onset    Heart Disease Mother           SOCIAL HISTORY       Social History     Socioeconomic History    Marital status: Single     Spouse name: Not on file    Number of children: Not on file    Years of education: Not on file    Highest education level: Not on file   Occupational History    Not on file   Social Needs    Financial resource strain: Not on file    Food insecurity     Worry: Not on file     Inability: Not on file    Transportation needs     Medical: Not on file     Non-medical: Not on file   Tobacco Use    Smoking status: Current Every Day Smoker     Packs/day: 1.00     Years: 5.00     Pack years: 5.00     Types: Cigarettes    Smokeless tobacco: Never Used   Substance and Sexual Activity    Alcohol use: Yes     Comment: occasional    Drug use: Not Currently    Sexual activity: Yes     Partners: Male   Lifestyle    Physical activity     Days per week: Not on file     Minutes per session: Not on file    Stress: Not on file   Relationships    Social connections     Talks on phone: Not on file     Gets together: Not on file     Attends Taoism service: Not on file     Active member of club or organization: Not on file     Attends meetings of clubs or organizations: Not on file     Relationship status: Not on file    Intimate partner violence     Fear of current or ex partner: Not on file     Emotionally abused: Not on file     Physically abused: Not on file     Forced sexual activity: Not on file   Other Topics Concern    Not on file   Social History Narrative    Not on file       SCREENINGS             PHYSICAL EXAM    (up to 7 for level 4, 8 or more for level 5)     ED Triage Vitals [10/23/20 0056]   BP Temp Temp Source Pulse Resp SpO2 Height Weight   (!) 133/94 98.4 °F (36.9 °C) Oral 102 16 99 % 5' (1.524 m) 110 lb (49.9 kg)       Physical Exam  Vitals signs and nursing note reviewed. Constitutional:       General: She is not in acute distress. Appearance: She is not diaphoretic. HENT:      Head: Normocephalic. Eyes:      Extraocular Movements: Extraocular movements intact. Conjunctiva/sclera: Conjunctivae normal.      Pupils: Pupils are equal, round, and reactive to light. Neck:      Musculoskeletal: Neck supple. Cardiovascular:      Rate and Rhythm: Normal rate and regular rhythm. Heart sounds: Normal heart sounds. Pulmonary:      Effort: Pulmonary effort is normal.      Breath sounds: Normal breath sounds. Musculoskeletal: Normal range of motion. Skin:     General: Skin is warm and dry. Comments: Pt with large superficial areas of skin with thickening of the skin in large patches with macerated sort of sloughing of the skin on the right anterior mid tibia with healed bronze areas of old scaring/healed ulcerations to left anterior/lateral/posterior lower left leg. No bleeding or drainage. No erythema to the wound areas.    Neurological:      Mental Status: She times for further biopsy and assessment          DISCHARGE MEDICATIONS:  New Prescriptions    MUPIROCIN (BACTROBAN) 2 % OINTMENT    Apply 3 times daily.     PREDNISONE (DELTASONE) 20 MG TABLET    Take one tab TID for 3 days, one tab BID for 3 days, one tab daily for 3 days, then stop       (Please note that portions of this note were completed with a voice recognition program.  Efforts were made to edit the dictations but occasionallywords are mis-transcribed.)    Mike Seymour DO (electronically signed)  Attending Emergency Physician          Mike Seymour DO  10/23/20 0206

## 2020-10-23 NOTE — ED NOTES
Ulcers cleaned with NS,silvadine oint. Applied on non adherant dressing along with kerlix roll.       Kiko Dowling RN  10/23/20 3847

## 2021-01-11 NOTE — PATIENT INSTRUCTIONS
· Keep a list of your medicines with you. List all of the prescription medicines, nonprescription medicines, supplements, natural remedies, and vitamins that you take. Tell your healthcare providers who treat you about all of the products you are taking. Your provider can provide you with a form to keep track of them. Just ask. · Follow the directions that come with your medicine, including information about food or alcohol. Make sure you know how and when to take your medicine. Do not take more or less than you are supposed to take. · Keep all medicines out of the reach of children. · Store medicines according to the directions on the label. · Monitor yourself. Learn to know how your body reacts to your new medicine and keep track of how it makes you feel before attempting (If your provider has allowed you to do so) to drive or go to work. · Seek emergency medical attention if you think you have used too much of this medicine. An overdose of any prescription medicine can be fatal. Overdose symptoms may include extreme drowsiness, muscle weakness, confusion, cold and clammy skin, pinpoint pupils, shallow breathing, slow heart rate, fainting, or coma. · Don't share prescription medicines with others, even when they seem to have the same symptoms. What may be good for you may be harmful to others. · If you are no longer taking a prescribed medication and you have pills left please take your pills out of their original containers. Mix crushed pills with an undesirable substance, such as cat litter or used coffee grounds. Put the mixture into a disposable container with a lid, such as an empty margarine tub, or into a sealable bag. Cover up or remove any of your personal information on the empty containers by covering it with black permanent marker or duct tape. Place the sealed container with the mixture, and the empty drug containers, in the trash.    · If you use a medication that is in the form of a patch, dispose of used patches by folding them in half so that the sticky sides meet, and then flushing them down a toilet. They should not be placed in the household trash where children or pets can find them. · If you have any questions, ask your provider or pharmacist for more information. · Be sure to keep all appointments for provider visits or tests. We are committed to providing you with the best care possible. In order to help us achieve these goals please remember to bring all medications, herbal products, and over the counter supplements with you to each visit. If your provider has ordered testing for you, please be sure to follow up with our office if you have not received results within 7 days after the testing took place. *If you receive a survey after visiting one of our offices, please take time to share your experience concerning your physician office visit. These surveys are confidential and no health information about you is shared. We are eager to improve for you and we are counting on your feedback to help make that happen. ips to Help You Stop Smoking       Cigarette smoking is a preventable cause of death in the [place]. If you have thought about quitting but haven't been able to, here are some reasons why you should and some ways to do it. Here's Why   Quitting smoking now can decrease your risk of getting smoking-related illnesses like:   Heart disease   Stroke   Several types of cancer, including:   Lung   Mouth   Esophagus   Larynx   Bladder   Pancreas   Kidney   Chronic lung diseases:   Bronchitis   Emphysema   Asthma   Cataracts   Macular degeneration   Thyroid conditions   Hearing loss   Erectile dysfunction   Dementia   Osteoporosis   Here's How   Once you've decided to quit smoking, set your target quit date a few weeks away.  In the time leading up to your quit day, try some of these ideas offered by the [address] [place] [address] [place] to help you successfully quit smoking. For the best results, work with your doctor. Together, you can test your lung function and compare the results to those of a nonsmoking person. The results can be given to you as your lung age. Finding out your lung age right after having the test done may help you to stop smoking. Your doctor can also discuss with you all of your options and refer you to smoking-cessation support groups. You may wish to use nicotine replacement (gum, patches, inhaler) or one of the prescription medications that have been shown to increase quit rates and prolong abstinence from smoking. But whatever you and your doctor decide on these matters, it will still be you who decides when an how to quit. Here are some techniques:   Switch Brands   Switch to a brand you find distasteful. Change to a brand that is low in tar and nicotine a couple of weeks before your target quit date. This will help change your smoking behavior. However, do not smoke more cigarettes, inhale them more often or more deeply, or place your fingertips over the holes in the filters. All of these actions will increase your nicotine intake, and the idea is to get your body used to functioning without nicotine. Cut Down the Number of Cigarettes You Smoke   Smoke only half of each cigarette. Each day, postpone the lighting of your first cigarette by one hour. Decide you'll only smoke during odd or even hours of the day. Decide beforehand how many cigarettes you'll smoke during the day. For each additional cigarette, give a dollar to your favorite bart. Change your eating habits to help you cut down. For example, drink milk, which many people consider incompatible with smoking. End meals or snacks with something that won't lead to a cigarette. Reach for a glass of juice instead of a cigarette for a \"pick-me-up. \"   Remember: Cutting down can help you quit, but it's not a substitute for quitting.  If you're down to about seven cigarettes a day, it's time to set your target quit date, and get ready to stick to it. Don't Smoke \"Automatically\"   Smoke only those cigarettes you really want. Catch yourself before you light up a cigarette out of pure habit. Don't empty your ashtrays. This will remind you of how many cigarettes you've smoked each day, and the sight and the smell of stale cigarettes butts will be very unpleasant. Make yourself aware of each cigarette by using the opposite hand or putting cigarettes in an unfamiliar location or a different pocket to break the automatic reach. If you light up many times during the day without even thinking about it, try to look in a mirror each time you put a match to your cigarette. You may decide you don't need it. Make Smoking Inconvenient   Stop buying cigarettes by the carton. Wait until one pack is empty before you buy another. Stop carrying cigarettes with you at home or at work. Make them difficult to get to. Make Smoking Unpleasant   Smoke only under circumstances that aren't especially pleasurable for you. If you like to smoke with others, smoke alone. Turn your chair to an empty corner and focus only on the cigarette you are smoking and all its many negative effects. Collect all your cigarette butts in one large glass container as a visual reminder of the filth made by smoking. Just Before Quitting   Practice going without cigarettes. Don't think of never smoking again. Think of quitting in terms of one day at a time . Tell yourself you won't smoke today, and then don't. Clean your clothes to rid them of the cigarette smell, which can linger a long time. On the Day You Quit   Throw away all your cigarettes and matches. Hide your lighters and ashtrays. Visit the dentist and have your teeth cleaned to get rid of tobacco stains. Notice how nice they look and resolve to keep them that way.    Make a list of things you'd like to buy for yourself or someone else. Estimate the cost in terms of packs of cigarettes, and put the money aside to buy these presents. Keep very busy on the big day. Go to the movies, exercise, take long walks, or go bike riding. Remind your family and friends that this is your quit date, and ask them to help you over the rough spots of the first couple of days and weeks. Buy yourself a treat or do something special to celebrate. Telephone and Internet Support   Telephone, web-, and computer-based programs can offer you the support that you need to quit and to stay smoke-free. You can find many programs online, like the American Lung Association's [place] from Smoking . Immediately After Quitting   Develop a clean, fresh, nonsmoking environment around yourselfat work and at home. Buy yourself flowersyou may be surprised how much you can enjoy their scent now. The first few days after you quit, spend as much free time as possible in places where smoking isn't allowed, such as [address] [place] [address], museums, theaters, department stores, and churches. Drink large quantities of water and fruit juice (but avoid sodas that contain caffeine). Try to avoid alcohol, coffee, and other beverages that you associate with cigarette smoking. Strike up conversation instead of a match for a cigarette. If you miss the sensation of having a cigarette in your hand, play with something elsea pencil, a paper clip, a marble. If you miss having something in your mouth, try toothpicks or a fake cigarette.

## 2021-01-14 ENCOUNTER — OFFICE VISIT (OUTPATIENT)
Dept: PRIMARY CARE CLINIC | Age: 31
End: 2021-01-14
Payer: MEDICAID

## 2021-01-14 ENCOUNTER — HOSPITAL ENCOUNTER (OUTPATIENT)
Facility: HOSPITAL | Age: 31
Discharge: HOME | End: 2021-01-14
Payer: COMMERCIAL

## 2021-01-14 VITALS
TEMPERATURE: 97.7 F | HEIGHT: 60 IN | HEART RATE: 110 BPM | WEIGHT: 120 LBS | DIASTOLIC BLOOD PRESSURE: 74 MMHG | SYSTOLIC BLOOD PRESSURE: 124 MMHG | OXYGEN SATURATION: 95 % | BODY MASS INDEX: 23.56 KG/M2

## 2021-01-14 DIAGNOSIS — L52 ERYTHEMA NODOSUM: Primary | ICD-10-CM

## 2021-01-14 DIAGNOSIS — F51.02 ADJUSTMENT INSOMNIA: ICD-10-CM

## 2021-01-14 DIAGNOSIS — Z23 NEED FOR PROPHYLACTIC VACCINATION AGAINST STREPTOCOCCUS PNEUMONIAE (PNEUMOCOCCUS): ICD-10-CM

## 2021-01-14 LAB
A/G RATIO: 1.4 (ref 0.8–2)
ALBUMIN SERPL-MCNC: 4.3 G/DL (ref 3.4–4.8)
ALP BLD-CCNC: 226 U/L (ref 25–100)
ALT SERPL-CCNC: 49 U/L (ref 4–36)
ANION GAP SERPL CALCULATED.3IONS-SCNC: 9 MMOL/L (ref 3–16)
AST SERPL-CCNC: 44 U/L (ref 8–33)
BILIRUB SERPL-MCNC: <0.2 MG/DL (ref 0.3–1.2)
BUN BLDV-MCNC: 5 MG/DL (ref 6–20)
CALCIUM SERPL-MCNC: 9.5 MG/DL (ref 8.5–10.5)
CHLORIDE BLD-SCNC: 103 MMOL/L (ref 98–107)
CO2: 27 MMOL/L (ref 20–30)
CREAT SERPL-MCNC: 0.6 MG/DL (ref 0.4–1.2)
GFR AFRICAN AMERICAN: >59
GFR NON-AFRICAN AMERICAN: >60
GLOBULIN: 3.1 G/DL
GLUCOSE BLD-MCNC: 79 MG/DL (ref 74–106)
HCT VFR BLD CALC: 35.9 % (ref 37–47)
HEMOGLOBIN: 10.2 G/DL (ref 11.5–16.5)
MCH RBC QN AUTO: 23 PG (ref 27–32)
MCHC RBC AUTO-ENTMCNC: 28.4 G/DL (ref 31–35)
MCV RBC AUTO: 81 FL (ref 80–100)
PDW BLD-RTO: 17.6 % (ref 11–16)
PLATELET # BLD: 439 K/UL (ref 150–400)
PMV BLD AUTO: 9.1 FL (ref 6–10)
POTASSIUM SERPL-SCNC: 4.8 MMOL/L (ref 3.4–5.1)
RBC # BLD: 4.43 M/UL (ref 3.8–5.8)
SEDIMENTATION RATE, ERYTHROCYTE: 43 MM/HR (ref 0–20)
SODIUM BLD-SCNC: 139 MMOL/L (ref 136–145)
TOTAL PROTEIN: 7.4 G/DL (ref 6.4–8.3)
WBC # BLD: 6.6 K/UL (ref 4–11)

## 2021-01-14 PROCEDURE — 29580 STRAPPING UNNA BOOT: CPT | Performed by: NURSE PRACTITIONER

## 2021-01-14 PROCEDURE — 85027 COMPLETE CBC AUTOMATED: CPT

## 2021-01-14 PROCEDURE — 85652 RBC SED RATE AUTOMATED: CPT

## 2021-01-14 PROCEDURE — 99214 OFFICE O/P EST MOD 30 MIN: CPT | Performed by: NURSE PRACTITIONER

## 2021-01-14 PROCEDURE — 80053 COMPREHEN METABOLIC PANEL: CPT

## 2021-01-14 RX ORDER — HYDROXYZINE PAMOATE 25 MG/1
25 CAPSULE ORAL NIGHTLY
Qty: 30 CAPSULE | Refills: 0 | Status: SHIPPED | OUTPATIENT
Start: 2021-01-14 | End: 2021-02-13

## 2021-01-14 RX ORDER — PREDNISONE 10 MG/1
TABLET ORAL
Qty: 20 TABLET | Refills: 0 | Status: SHIPPED | OUTPATIENT
Start: 2021-01-14 | End: 2021-02-24 | Stop reason: ALTCHOICE

## 2021-01-14 ASSESSMENT — ENCOUNTER SYMPTOMS
RESPIRATORY NEGATIVE: 1
GASTROINTESTINAL NEGATIVE: 1
COLOR CHANGE: 1

## 2021-01-14 ASSESSMENT — PATIENT HEALTH QUESTIONNAIRE - PHQ9
2. FEELING DOWN, DEPRESSED OR HOPELESS: 0
SUM OF ALL RESPONSES TO PHQ QUESTIONS 1-9: 0
1. LITTLE INTEREST OR PLEASURE IN DOING THINGS: 0

## 2021-01-14 NOTE — PROGRESS NOTES
SUBJECTIVE:    Patient ID: Daniel Sneed is a 31 y.o. female. Chief Complaint   Patient presents with    Establish Care    Other     Chrones disease         HPI:  [facility] She has a history of chron's disease and fibromyalgia. She is on Stelera but has not been on it since she has been incarcerated. She says she is having a flare up. She has been off the Witry for 6-7 months. She has been dealing with these ulcerations and rashes since before May 2020. She has seen multiple providers including multiple ER visits. She was suppose to go to Dermatology but has never made any appt. She has been missing her appt at [facility] [address] as well. She says she has frequent bowel movements but denies any mucous or blood in stool. She is not having abdominal pain or fever. Her legs are not weeping or bleeding. No pus. She says she is not sleeping due to pain and agitation. Patient's medications,allergies, past medical, surgical, social and family histories were reviewed and updated as appropriate. .  Current Outpatient Medications on File Prior to Visit   Medication Sig Dispense Refill    predniSONE (DELTASONE) 20 MG tablet Take one tab TID for 3 days, one tab BID for 3 days, one tab daily for 3 days, then stop (Patient not taking: Reported on 1/14/2021) 18 tablet 0    silver sulfADIAZINE (SILVADENE) 1 % cream Apply topically daily to affected area. (Patient not taking: Reported on 1/14/2021) 400 g 1    gabapentin (NEURONTIN) 600 MG tablet Take 1 tablet by mouth daily for 30 days. 90 tablet 0    silver sulfADIAZINE (SILVADENE) 1 % cream Apply topically daily to lower leg skin ulcers (Patient not taking: Reported on 1/14/2021) 400 g 1    gabapentin (NEURONTIN) 600 MG tablet Take 1 tablet by mouth 4 times daily for 30 days. 120 tablet 0    triamcinolone (KENALOG) 0.1 % cream Apply topically 2 times daily.  (Patient not taking: Reported on 1/14/2021) 80 g 3    baclofen (LIORESAL) 10 MG tablet TAKE ONE TABLET BY MOUTH TWO TIMES A DAY AS NEEDED FOR MUSCLE SPASMS (Patient not taking: Reported on 1/14/2021) 60 tablet 1    buprenorphine-naloxone (SUBOXONE) 8-2 MG FILM SL film Place 1 Film under the tongue daily.  ustekinumab (STELARA) 90 MG/ML SOSY prefilled syringe Inject 90 mg into the skin once Every 8 weeks       No current facility-administered medications on file prior to visit. Review of Systems   Constitutional: Negative. HENT: Negative. Respiratory: Negative. Cardiovascular: Negative. Gastrointestinal: Negative. Genitourinary: Negative. Musculoskeletal: Negative. Skin: Positive for color change and rash. Neurological: Negative. Psychiatric/Behavioral: Positive for sleep disturbance. OBJECTIVE:  /74   Pulse 110   Temp 97.7 °F (36.5 °C)   Ht 5' (1.524 m)   Wt 120 lb (54.4 kg)   SpO2 95%   BMI 23.44 kg/m²    Physical Exam  Vitals signs and nursing note reviewed. Constitutional:       Appearance: She is well-developed. HENT:      Head: Normocephalic and atraumatic. Eyes:      Conjunctiva/sclera: Conjunctivae normal.      Pupils: Pupils are equal, round, and reactive to light. Neck:      Musculoskeletal: Normal range of motion and neck supple. Thyroid: No thyromegaly. Vascular: No JVD. Cardiovascular:      Rate and Rhythm: Normal rate and regular rhythm. Heart sounds: No murmur. No friction rub. No gallop. Pulmonary:      Effort: Pulmonary effort is normal. No respiratory distress. Breath sounds: Normal breath sounds. No wheezing or rales. Abdominal:      General: Bowel sounds are normal. There is no distension. Palpations: Abdomen is soft. Tenderness: There is no abdominal tenderness. There is no guarding. Musculoskeletal:         General: No tenderness. Skin:     General: Skin is warm and dry. Findings: No rash.           Neurological:      Mental Status: She is alert and oriented to person, place, and time. Psychiatric:         Judgment: Judgment normal.       Right leg      Left leg    No results found for requested labs within last 30 days. Microscopic Examination (no units)   Date Value   01/28/2015 Not Indicated     LDL Calculated (MG/DL)   Date Value   02/12/2014 106           Lab Results   Component Value Date    TSH 1.99 09/21/2016         ASSESSMENT/PLAN:     Reji Hendricks was seen today for establish care and other. Diagnoses and all orders for this visit:    Erythema nodosum  -     CBC; Future  -     Comprehensive Metabolic Panel; Future  -     Sedimentation Rate; Future  -     predniSONE (DELTASONE) 10 MG tablet; 2 po bid for 5 days then 1 po daily for 10 days.  -     DE APPLY OF PASTE BOOT  Dimple boot wraps applied to both legs. Unable to use nsaids due to chron's diagnosis. Will treat with steroid and have her return in 3 weeks. krystal needs to see dermatology for final diagnosis. Needs to see her gi specialist as well. Adjustment insomnia  -     hydrOXYzine (VISTARIL) 25 MG capsule; Take 1 capsule by mouth nightly          There are no discontinued medications.

## 2021-02-24 ENCOUNTER — OFFICE VISIT (OUTPATIENT)
Dept: PRIMARY CARE CLINIC | Age: 31
End: 2021-02-24
Payer: MEDICAID

## 2021-02-24 ENCOUNTER — HOSPITAL ENCOUNTER (OUTPATIENT)
Facility: HOSPITAL | Age: 31
Discharge: HOME | End: 2021-02-24
Payer: COMMERCIAL

## 2021-02-24 VITALS
SYSTOLIC BLOOD PRESSURE: 96 MMHG | RESPIRATION RATE: 16 BRPM | WEIGHT: 129 LBS | DIASTOLIC BLOOD PRESSURE: 68 MMHG | OXYGEN SATURATION: 98 % | HEIGHT: 60 IN | HEART RATE: 87 BPM | TEMPERATURE: 98 F | BODY MASS INDEX: 25.32 KG/M2

## 2021-02-24 DIAGNOSIS — R74.8 ELEVATED LIVER ENZYMES: ICD-10-CM

## 2021-02-24 DIAGNOSIS — K50.118 CROHN'S DISEASE OF COLON WITH OTHER COMPLICATION (HCC): ICD-10-CM

## 2021-02-24 DIAGNOSIS — Z91.89 ENCOUNTER FOR HEPATITIS C VIRUS SCREENING TEST FOR HIGH RISK PATIENT: ICD-10-CM

## 2021-02-24 DIAGNOSIS — L52 ERYTHEMA NODOSUM: ICD-10-CM

## 2021-02-24 DIAGNOSIS — Z11.59 ENCOUNTER FOR HEPATITIS C VIRUS SCREENING TEST FOR HIGH RISK PATIENT: ICD-10-CM

## 2021-02-24 DIAGNOSIS — L52 ERYTHEMA NODOSUM: Primary | ICD-10-CM

## 2021-02-24 LAB
A/G RATIO: 1.7 (ref 0.8–2)
ALBUMIN SERPL-MCNC: 4.8 G/DL (ref 3.4–4.8)
ALP BLD-CCNC: 115 U/L (ref 25–100)
ALT SERPL-CCNC: 10 U/L (ref 4–36)
ANION GAP SERPL CALCULATED.3IONS-SCNC: 13 MMOL/L (ref 3–16)
AST SERPL-CCNC: 18 U/L (ref 8–33)
BILIRUB SERPL-MCNC: <0.2 MG/DL (ref 0.3–1.2)
BUN BLDV-MCNC: 8 MG/DL (ref 6–20)
CALCIUM SERPL-MCNC: 9.2 MG/DL (ref 8.5–10.5)
CHLORIDE BLD-SCNC: 106 MMOL/L (ref 98–107)
CO2: 25 MMOL/L (ref 20–30)
CREAT SERPL-MCNC: 0.6 MG/DL (ref 0.4–1.2)
GFR AFRICAN AMERICAN: >59
GFR NON-AFRICAN AMERICAN: >60
GLOBULIN: 2.8 G/DL
GLUCOSE BLD-MCNC: 77 MG/DL (ref 74–106)
HCT VFR BLD CALC: 39 % (ref 37–47)
HEMOGLOBIN: 11.2 G/DL (ref 11.5–16.5)
MCH RBC QN AUTO: 23.8 PG (ref 27–32)
MCHC RBC AUTO-ENTMCNC: 28.7 G/DL (ref 31–35)
MCV RBC AUTO: 82.8 FL (ref 80–100)
PDW BLD-RTO: 17.1 % (ref 11–16)
PLATELET # BLD: 334 K/UL (ref 150–400)
PMV BLD AUTO: 10.4 FL (ref 6–10)
POTASSIUM SERPL-SCNC: 4.4 MMOL/L (ref 3.4–5.1)
RBC # BLD: 4.71 M/UL (ref 3.8–5.8)
SEDIMENTATION RATE, ERYTHROCYTE: 10 MM/HR (ref 0–20)
SODIUM BLD-SCNC: 144 MMOL/L (ref 136–145)
TOTAL PROTEIN: 7.6 G/DL (ref 6.4–8.3)
WBC # BLD: 6.1 K/UL (ref 4–11)

## 2021-02-24 PROCEDURE — 86702 HIV-2 ANTIBODY: CPT

## 2021-02-24 PROCEDURE — 86701 HIV-1ANTIBODY: CPT

## 2021-02-24 PROCEDURE — 87390 HIV-1 AG IA: CPT

## 2021-02-24 PROCEDURE — 99214 OFFICE O/P EST MOD 30 MIN: CPT | Performed by: NURSE PRACTITIONER

## 2021-02-24 PROCEDURE — 29580 STRAPPING UNNA BOOT: CPT | Performed by: NURSE PRACTITIONER

## 2021-02-24 PROCEDURE — 80053 COMPREHEN METABOLIC PANEL: CPT

## 2021-02-24 PROCEDURE — 85652 RBC SED RATE AUTOMATED: CPT

## 2021-02-24 PROCEDURE — 85027 COMPLETE CBC AUTOMATED: CPT

## 2021-02-24 RX ORDER — AZATHIOPRINE 50 MG/1
50 TABLET ORAL DAILY
COMMUNITY
End: 2022-05-27

## 2021-02-24 RX ORDER — HYDROXYZINE HYDROCHLORIDE 25 MG/1
25 TABLET, FILM COATED ORAL 2 TIMES DAILY
Qty: 60 TABLET | Refills: 3 | Status: SHIPPED | OUTPATIENT
Start: 2021-02-24 | End: 2021-02-24 | Stop reason: SDUPTHER

## 2021-02-24 RX ORDER — PREDNISONE 20 MG/1
TABLET ORAL
Qty: 18 TABLET | Refills: 0 | Status: SHIPPED | OUTPATIENT
Start: 2021-02-24 | End: 2021-02-24 | Stop reason: SDUPTHER

## 2021-02-24 RX ORDER — PREDNISONE 20 MG/1
TABLET ORAL
Qty: 18 TABLET | Refills: 0 | Status: SHIPPED | OUTPATIENT
Start: 2021-02-24 | End: 2022-05-27 | Stop reason: ALTCHOICE

## 2021-02-24 RX ORDER — HYDROXYZINE HYDROCHLORIDE 25 MG/1
25 TABLET, FILM COATED ORAL 2 TIMES DAILY
Qty: 60 TABLET | Refills: 3 | Status: SHIPPED | OUTPATIENT
Start: 2021-02-24 | End: 2022-05-27

## 2021-02-24 RX ORDER — DULOXETIN HYDROCHLORIDE 30 MG/1
30 CAPSULE, DELAYED RELEASE ORAL DAILY
Qty: 30 CAPSULE | Refills: 3 | Status: SHIPPED | OUTPATIENT
Start: 2021-02-24 | End: 2022-05-27 | Stop reason: SDUPTHER

## 2021-02-24 RX ORDER — HYDROXYZINE HYDROCHLORIDE 25 MG/1
25 TABLET, FILM COATED ORAL NIGHTLY
COMMUNITY
End: 2021-02-24 | Stop reason: SDUPTHER

## 2021-02-24 RX ORDER — DULOXETIN HYDROCHLORIDE 30 MG/1
30 CAPSULE, DELAYED RELEASE ORAL DAILY
Qty: 30 CAPSULE | Refills: 3 | Status: SHIPPED | OUTPATIENT
Start: 2021-02-24 | End: 2021-02-24 | Stop reason: SDUPTHER

## 2021-02-24 ASSESSMENT — ENCOUNTER SYMPTOMS
GASTROINTESTINAL NEGATIVE: 1
RESPIRATORY NEGATIVE: 1

## 2021-02-24 NOTE — PROGRESS NOTES
Chief Complaint   Patient presents with    Leg Pain     bilateral    Skin Discoloration       Have you seen any other physician or provider since your last visit no    Have you had any other diagnostic tests since your last visit? yes - labs last visit    Have you changed or stopped any medications since your last visit? no     I have recommended that this patient have a immunization for pneumonia but she declines at this time. I have discussed the risks and benefits of this examination with her. The patient verbalizes understanding. Diabetic retinal exam completed this year? No                       * If yes please have patient sign a records release to obtain record to update Health Maintenance                       * If no, please order referral for patient to be scheduled     Patient is here to follow up on bilateral leg pain and discoloration.

## 2021-02-24 NOTE — PATIENT INSTRUCTIONS
dispose of used patches by folding them in half so that the sticky sides meet, and then flushing them down a toilet. They should not be placed in the household trash where children or pets can find them. · If you have any questions, ask your provider or pharmacist for more information. · Be sure to keep all appointments for provider visits or tests. We are committed to providing you with the best care possible. In order to help us achieve these goals please remember to bring all medications, herbal products, and over the counter supplements with you to each visit. If your provider has ordered testing for you, please be sure to follow up with our office if you have not received results within 7 days after the testing took place. *If you receive a survey after visiting one of our offices, please take time to share your experience concerning your physician office visit. These surveys are confidential and no health information about you is shared. We are eager to improve for you and we are counting on your feedback to help make that happen. ips to Help You Stop Smoking       Cigarette smoking is a preventable cause of death in [facility]. If you have thought about quitting but haven't been able to, here are some reasons why you should and some ways to do it. Here's Why   Quitting smoking now can decrease your risk of getting smoking-related illnesses like:   Heart disease   Stroke   Several types of cancer, including:   Lung   Mouth   Esophagus   Larynx   Bladder   Pancreas   Kidney   Chronic lung diseases:   Bronchitis   Emphysema   Asthma   Cataracts   Macular degeneration   Thyroid conditions   Hearing loss   Erectile dysfunction   Dementia   Osteoporosis   Here's How   Once you've decided to quit smoking, set your target quit date a few weeks away.  In the time leading up to your quit day, try some of these ideas offered by the [address] [place] [address] [place] to help you successfully quit smoking. For the best results, work with your doctor. Together, you can test your lung function and compare the results to those of a nonsmoking person. The results can be given to you as your lung age. Finding out your lung age right after having the test done may help you to stop smoking. Your doctor can also discuss with you all of your options and refer you to smoking-cessation support groups. You may wish to use nicotine replacement (gum, patches, inhaler) or one of the prescription medications that have been shown to increase quit rates and prolong abstinence from smoking. But whatever you and your doctor decide on these matters, it will still be you who decides when an how to quit. Here are some techniques:   Switch Brands   Switch to a brand you find distasteful. Change to a brand that is low in tar and nicotine a couple of weeks before your target quit date. This will help change your smoking behavior. However, do not smoke more cigarettes, inhale them more often or more deeply, or place your fingertips over the holes in the filters. All of these actions will increase your nicotine intake, and the idea is to get your body used to functioning without nicotine. Cut Down the Number of Cigarettes You Smoke   Smoke only half of each cigarette. Each day, postpone the lighting of your first cigarette by one hour. Decide you'll only smoke during odd or even hours of the day. Decide beforehand how many cigarettes you'll smoke during the day. For each additional cigarette, give a dollar to your favorite bart. Change your eating habits to help you cut down. For example, drink milk, which many people consider incompatible with smoking. End meals or snacks with something that won't lead to a cigarette. Reach for a glass of juice instead of a cigarette for a \"pick-me-up. \"   Remember: Cutting down can help you quit, but it's not a substitute for quitting.  If you're down to about seven cigarettes a day, it's time to set your target quit date, and get ready to stick to it. Don't Smoke \"Automatically\"   Smoke only those cigarettes you really want. Catch yourself before you light up a cigarette out of pure habit. Don't empty your ashtrays. This will remind you of how many cigarettes you've smoked each day, and the sight and the smell of stale cigarettes butts will be very unpleasant. Make yourself aware of each cigarette by using the opposite hand or putting cigarettes in an unfamiliar location or a different pocket to break the automatic reach. If you light up many times during the day without even thinking about it, try to look in a mirror each time you put a match to your cigarette. You may decide you don't need it. Make Smoking Inconvenient   Stop buying cigarettes by the carton. Wait until one pack is empty before you buy another. Stop carrying cigarettes with you at home or at work. Make them difficult to get to. Make Smoking Unpleasant   Smoke only under circumstances that aren't especially pleasurable for you. If you like to smoke with others, smoke alone. Turn your chair to an empty corner and focus only on the cigarette you are smoking and all its many negative effects. Collect all your cigarette butts in one large glass container as a visual reminder of the filth made by smoking. Just Before Quitting   Practice going without cigarettes. Don't think of never smoking again. Think of quitting in terms of one day at a time . Tell yourself you won't smoke today, and then don't. Clean your clothes to rid them of the cigarette smell, which can linger a long time. On the Day You Quit   Throw away all your cigarettes and matches. Hide your lighters and ashtrays. Visit the dentist and have your teeth cleaned to get rid of tobacco stains. Notice how nice they look and resolve to keep them that way.    Make a list of things you'd like to buy for yourself or someone else. Estimate the cost in terms of packs of cigarettes, and put the money aside to buy these presents. Keep very busy on the big day. Go to the movies, exercise, take long walks, or go bike riding. Remind your family and friends that this is your quit date, and ask them to help you over the rough spots of the first couple of days and weeks. Buy yourself a treat or do something special to celebrate. Telephone and Internet Support   Telephone, web-, and computer-based programs can offer you the support that you need to quit and to stay smoke-free. You can find many programs online, like the American [employer] Association's [place] from Smoking . Immediately After Quitting   Develop a clean, fresh, nonsmoking environment around yourselfat work and at home. Buy yourself flowersyou may be surprised how much you can enjoy their scent now. The first few days after you quit, spend as much free time as possible in places where smoking isn't allowed, such as [address] [place] [address], museums, theaters, department stores, and churches. Drink large quantities of water and fruit juice (but avoid sodas that contain caffeine). Try to avoid alcohol, coffee, and other beverages that you associate with cigarette smoking. Strike up conversation instead of a match for a cigarette. If you miss the sensation of having a cigarette in your hand, play with something elsea pencil, a paper clip, a marble. If you miss having something in your mouth, try toothpicks or a fake cigarette.

## 2021-02-24 NOTE — PROGRESS NOTES
prednSUBJECTIVE:    Patient ID: Lesley Michel is a 31 y.o. female. Chief Complaint   Patient presents with    Leg Pain     bilateral    Skin Discoloration         HPI:  She comes in with discoloration to follow up on her medication and labs. Her legs have improved since last visit. She has been taking vistaril only at night. She complains of leg pain and is requesting medication Neurontin. Patient's medications,allergies, past medical, surgical, social and family histories were reviewed and updated as appropriate. .  Current Outpatient Medications on File Prior to Visit   Medication Sig Dispense Refill    azaTHIOprine (IMURAN) 50 MG tablet Take 50 mg by mouth daily      silver sulfADIAZINE (SILVADENE) 1 % cream Apply topically daily to lower leg skin ulcers 400 g 1    baclofen (LIORESAL) 10 MG tablet TAKE ONE TABLET BY MOUTH TWO TIMES A DAY AS NEEDED FOR MUSCLE SPASMS (Patient not taking: Reported on 1/14/2021) 60 tablet 1    buprenorphine-naloxone (SUBOXONE) 8-2 MG FILM SL film Place 1 Film under the tongue daily.  ustekinumab (STELARA) 90 MG/ML SOSY prefilled syringe Inject 90 mg into the skin once Every 8 weeks       No current facility-administered medications on file prior to visit. Review of Systems   Constitutional: Negative. HENT: Negative. Respiratory: Negative. Cardiovascular: Negative. Gastrointestinal: Negative. Genitourinary: Negative. Musculoskeletal: Positive for arthralgias (leg pain). Skin: Positive for color change. Neurological: Negative. Psychiatric/Behavioral: Negative. OBJECTIVE:  BP 96/68 (Site: Right Upper Arm, Position: Sitting, Cuff Size: Medium Adult)   Pulse 87   Temp 98 °F (36.7 °C) (Temporal)   Resp 16   Ht 5' (1.524 m)   Wt 129 lb (58.5 kg)   SpO2 98% Comment: room air  BMI 25.19 kg/m²    Physical Exam  Vitals signs and nursing note reviewed. Constitutional:       Appearance: She is well-developed.    HENT:      Head: Normocephalic and atraumatic. Eyes:      Conjunctiva/sclera: Conjunctivae normal.      Pupils: Pupils are equal, round, and reactive to light. Neck:      Musculoskeletal: Normal range of motion and neck supple. Thyroid: No thyromegaly. Vascular: No JVD. Cardiovascular:      Rate and Rhythm: Normal rate and regular rhythm. Heart sounds: No murmur. No friction rub. No gallop. Pulmonary:      Effort: Pulmonary effort is normal. No respiratory distress. Breath sounds: Normal breath sounds. No wheezing or rales. Abdominal:      General: Bowel sounds are normal. There is no distension. Palpations: Abdomen is soft. Tenderness: There is no abdominal tenderness. There is no guarding. Musculoskeletal:         General: No tenderness. Skin:     General: Skin is warm and dry. Findings: No rash. Neurological:      Mental Status: She is alert and oriented to person, place, and time.    Psychiatric:         Judgment: Judgment normal.     Right leg              Results in Past 30 Days  Result Component Current Result Ref Range Previous Result Ref Range   Albumin/Globulin Ratio 1.7 (2/24/2021) 0.8 - 2.0 Not in Time Range    Albumin 4.8 (2/24/2021) 3.4 - 4.8 g/dL Not in Time Range    Alkaline Phosphatase 115 (H) (2/24/2021) 25 - 100 U/L Not in Time Range    ALT 10 (2/24/2021) 4 - 36 U/L Not in Time Range    AST 18 (2/24/2021) 8 - 33 U/L Not in Time Range    BUN 8 (2/24/2021) 6 - 20 mg/dL Not in Time Range    Calcium 9.2 (2/24/2021) 8.5 - 10.5 mg/dL Not in Time Range    Chloride 106 (2/24/2021) 98 - 107 mmol/L Not in Time Range    CO2 25 (2/24/2021) 20 - 30 mmol/L Not in Time Range    CREATININE 0.6 (2/24/2021) 0.4 - 1.2 mg/dL Not in Time Range    GFR  >59 (2/24/2021) >59 Not in Time Range    GFR Non- >60 (2/24/2021) >59 Not in Time Range    Globulin 2.8 (2/24/2021) g/dL Not in Time Range    Glucose 77 (2/24/2021) 74 - 106 mg/dL Not in Time Range Potassium 4.4 (2/24/2021) 3.4 - 5.1 mmol/L Not in Time Range    Sodium 144 (2/24/2021) 136 - 145 mmol/L Not in Time Range    Total Bilirubin <0.2 (L) (2/24/2021) 0.3 - 1.2 mg/dL Not in Time Range    Total Protein 7.6 (2/24/2021) 6.4 - 8.3 g/dL Not in Time Range        Microscopic Examination (no units)   Date Value   01/28/2015 Not Indicated     LDL Calculated (MG/DL)   Date Value   02/12/2014 106           Lab Results   Component Value Date    TSH 1.99 09/21/2016         ASSESSMENT/PLAN:     Serg Rosa was seen today for leg pain and skin discoloration. Diagnoses and all orders for this visit:    Erythema nodosum  -     Discontinue: predniSONE (DELTASONE) 20 MG tablet; One po bid for 5 days, then 1 po daily for 5 days then 1/2 po daily for 5 days. -     predniSONE (DELTASONE) 20 MG tablet; One po bid for 5 days, then 1 po daily for 5 days then 1/2 po daily for 5 days. -     Discontinue: hydrOXYzine (ATARAX) 25 MG tablet; Take 1 tablet by mouth 2 times daily  -     Discontinue: DULoxetine (CYMBALTA) 30 MG extended release capsule; Take 1 capsule by mouth daily Take with the 60 mg for total of 90  -     SEDIMENTATION RATE; Future  -     hydrOXYzine (ATARAX) 25 MG tablet; Take 1 tablet by mouth 2 times daily  -     DULoxetine (CYMBALTA) 30 MG extended release capsule; Take 1 capsule by mouth daily Take with the 60 mg for total of 90  -     IA APPLY OF PASTE BOOT  Unna boot wraps applied to both legs. Ace wraps applied over top of the medicated wrap. Instructed to tighten if the wraps become loose or loosen if the wraps become tight instructed to leave for 5 days as tolerated. Elevated liver enzymes  -     CBC; Future  -     Comprehensive Metabolic Panel; Future  -     Hepatitis Panel, Acute    Encounter for hepatitis C virus screening test for high risk patient  -     Hepatitis Panel, Acute  -     HIV-1 AND HIV-2 ANTIBODIES;  Future    [facility] other [facility]  -     Discontinue: predniSONE (DELTASONE) 20 MG tablet; One po bid for 5 days, then 1 po daily for 5 days then 1/2 po daily for 5 days. -     predniSONE (DELTASONE) 20 MG tablet; One po bid for 5 days, then 1 po daily for 5 days then 1/2 po daily for 5 days. -     Discontinue: hydrOXYzine (ATARAX) 25 MG tablet; Take 1 tablet by mouth 2 times daily  -     hydrOXYzine (ATARAX) 25 MG tablet;  Take 1 tablet by mouth 2 times daily      Medications Discontinued During This Encounter   Medication Reason    gabapentin (NEURONTIN) 600 MG tablet LIST CLEANUP    gabapentin (NEURONTIN) 600 MG tablet LIST CLEANUP    predniSONE (DELTASONE) 10 MG tablet Therapy completed    silver sulfADIAZINE (SILVADENE) 1 % cream DUPLICATE    predniSONE (DELTASONE) 20 MG tablet Therapy completed    triamcinolone (KENALOG) 0.1 % cream Therapy completed    hydrOXYzine (ATARAX) 25 MG tablet REORDER    predniSONE (DELTASONE) 20 MG tablet REORDER    hydrOXYzine (ATARAX) 25 MG tablet REORDER    DULoxetine (CYMBALTA) 30 MG extended release capsule REORDER

## 2021-02-26 LAB
HIV AG/AB: NORMAL
HIV ANTIGEN: NORMAL
HIV-1 ANTIBODY: NORMAL
HIV-2 AB: NORMAL

## 2021-03-07 ASSESSMENT — ENCOUNTER SYMPTOMS: COLOR CHANGE: 1

## 2022-05-27 ENCOUNTER — OFFICE VISIT (OUTPATIENT)
Dept: PRIMARY CARE CLINIC | Age: 32
End: 2022-05-27
Payer: COMMERCIAL

## 2022-05-27 VITALS
DIASTOLIC BLOOD PRESSURE: 68 MMHG | OXYGEN SATURATION: 97 % | RESPIRATION RATE: 16 BRPM | BODY MASS INDEX: 30.9 KG/M2 | SYSTOLIC BLOOD PRESSURE: 98 MMHG | HEART RATE: 109 BPM | HEIGHT: 60 IN | WEIGHT: 157.4 LBS | TEMPERATURE: 98.8 F

## 2022-05-27 DIAGNOSIS — M79.7 FIBROMYALGIA: ICD-10-CM

## 2022-05-27 DIAGNOSIS — K50.10 CROHN'S DISEASE OF COLON WITHOUT COMPLICATION (HCC): Primary | ICD-10-CM

## 2022-05-27 PROCEDURE — 99213 OFFICE O/P EST LOW 20 MIN: CPT | Performed by: PHYSICIAN ASSISTANT

## 2022-05-27 PROCEDURE — 4004F PT TOBACCO SCREEN RCVD TLK: CPT | Performed by: PHYSICIAN ASSISTANT

## 2022-05-27 PROCEDURE — G8419 CALC BMI OUT NRM PARAM NOF/U: HCPCS | Performed by: PHYSICIAN ASSISTANT

## 2022-05-27 PROCEDURE — G8427 DOCREV CUR MEDS BY ELIG CLIN: HCPCS | Performed by: PHYSICIAN ASSISTANT

## 2022-05-27 RX ORDER — DULOXETIN HYDROCHLORIDE 60 MG/1
60 CAPSULE, DELAYED RELEASE ORAL DAILY
Qty: 30 CAPSULE | Refills: 3 | Status: SHIPPED | OUTPATIENT
Start: 2022-05-27 | End: 2022-06-17 | Stop reason: DRUGHIGH

## 2022-05-27 RX ORDER — DULOXETIN HYDROCHLORIDE 30 MG/1
30 CAPSULE, DELAYED RELEASE ORAL DAILY
Qty: 30 CAPSULE | Refills: 3 | Status: SHIPPED | OUTPATIENT
Start: 2022-05-27 | End: 2022-07-22 | Stop reason: DRUGHIGH

## 2022-05-27 SDOH — ECONOMIC STABILITY: FOOD INSECURITY: WITHIN THE PAST 12 MONTHS, YOU WORRIED THAT YOUR FOOD WOULD RUN OUT BEFORE YOU GOT MONEY TO BUY MORE.: NEVER TRUE

## 2022-05-27 SDOH — ECONOMIC STABILITY: FOOD INSECURITY: WITHIN THE PAST 12 MONTHS, THE FOOD YOU BOUGHT JUST DIDN'T LAST AND YOU DIDN'T HAVE MONEY TO GET MORE.: NEVER TRUE

## 2022-05-27 ASSESSMENT — ENCOUNTER SYMPTOMS
VOMITING: 0
SHORTNESS OF BREATH: 0
SORE THROAT: 0
CONSTIPATION: 0
EYE PAIN: 0
DIARRHEA: 1
ABDOMINAL PAIN: 0
COUGH: 0

## 2022-05-27 ASSESSMENT — PATIENT HEALTH QUESTIONNAIRE - PHQ9
SUM OF ALL RESPONSES TO PHQ9 QUESTIONS 1 & 2: 0
SUM OF ALL RESPONSES TO PHQ QUESTIONS 1-9: 0
2. FEELING DOWN, DEPRESSED OR HOPELESS: 0
SUM OF ALL RESPONSES TO PHQ QUESTIONS 1-9: 0
SUM OF ALL RESPONSES TO PHQ QUESTIONS 1-9: 0
1. LITTLE INTEREST OR PLEASURE IN DOING THINGS: 0
SUM OF ALL RESPONSES TO PHQ QUESTIONS 1-9: 0

## 2022-05-27 ASSESSMENT — SOCIAL DETERMINANTS OF HEALTH (SDOH): HOW HARD IS IT FOR YOU TO PAY FOR THE VERY BASICS LIKE FOOD, HOUSING, MEDICAL CARE, AND HEATING?: NOT HARD AT ALL

## 2022-05-27 NOTE — PROGRESS NOTES
SUBJECTIVE:    Patient ID: Delfino Montano is a 36 y. o.female. Chief Complaint   Patient presents with    Crohn's Disease     needs referral Dr Chayito Ho @ [facility] clinic    Medication Management         HPI:  Ms. Eliecer Boucher is a 36year old female with PMH Crohns disease, h/o substance abuse (no longer on Suboxone) and Fibromyalgia here for follow up. She was on Stelara in past.   She does continue to use marijuana but denies the use of any other illegal substances. She denies any history of IV drug use. She admits to decreased apetite and increased diarrhea. Last colonoscopy was 4 years ago [address]. Patient's medications, allergies, past medical, surgical, social and family histories were reviewed and updated as appropriate in electronic medical record. Outpatient Medications Marked as Taking for the 5/27/22 encounter (Office Visit) with Carmella Garg PA-C   Medication Sig Dispense Refill    DULoxetine (CYMBALTA) 30 MG extended release capsule Take 1 capsule by mouth daily 30 capsule 3    DULoxetine (CYMBALTA) 60 MG extended release capsule Take 1 capsule by mouth daily 30 capsule 3        Review of Systems   Constitutional: Positive for appetite change and unexpected weight change. Negative for chills, fatigue and fever. HENT: Negative for congestion, ear pain, nosebleeds and sore throat. Eyes: Negative for pain and visual disturbance. Respiratory: Negative for cough and shortness of breath. Cardiovascular: Negative for chest pain and palpitations. Gastrointestinal: Positive for diarrhea. Negative for abdominal pain, constipation and vomiting. Genitourinary: Negative for difficulty urinating and flank pain. Musculoskeletal: Negative for arthralgias, gait problem and myalgias. Skin: Negative for rash. Neurological: Negative for syncope, light-headedness and headaches. Psychiatric/Behavioral: Negative for behavioral problems, confusion and dysphoric mood.        Past Medical History:   Diagnosis Date    Arthritis     Cro[facility]'s disease in [facility] ([facility] [facility]     Fibromyalgia     Fibromyalgia     Immune deficiency disorder (HCC)      Past Surgical History:   Procedure Laterality Date    AV FISTULA REPAIR  1992    COLONOSCOPY  elliott 10, 2015     Family History   Problem Relation Age of Onset    Heart Disease Mother       Social History     Tobacco Use   Smoking Status Current Every Day Smoker    Packs/day: 1.00    Years: 5.00    Pack years: 5.00    Types: Cigarettes   Smokeless Tobacco Never Used       OBJECTIVE:   Wt Readings from Last 3 Encounters:   05/27/22 157 lb 6.4 oz (71.4 kg)   02/24/21 129 lb (58.5 kg)   01/14/21 120 lb (54.4 kg)     BP Readings from Last 3 Encounters:   05/27/22 98/68   02/24/21 96/68   01/14/21 124/74       BP 98/68 (Site: Left Upper Arm, Position: Sitting, Cuff Size: Medium Adult)   Pulse (!) 109   Temp 98.8 °F (37.1 °C) (Temporal)   Resp 16   Ht 5' (1.524 m)   Wt 157 lb 6.4 oz (71.4 kg)   LMP 05/02/2022   SpO2 97% Comment: room air  BMI 30.74 kg/m²      Physical Exam  Vitals reviewed. Constitutional:       Appearance: Normal appearance. HENT:      Head: Normocephalic and atraumatic. Right Ear: External ear normal.      Left Ear: External ear normal.      Nose: Nose normal.      Mouth/Throat:      Mouth: Mucous membranes are moist.      Pharynx: Oropharynx is clear. Eyes:      Extraocular Movements: Extraocular movements intact. Conjunctiva/sclera: Conjunctivae normal.      Pupils: Pupils are equal, round, and reactive to light. Cardiovascular:      Rate and Rhythm: Normal rate and regular rhythm. Pulses: Normal pulses. Heart sounds: Normal heart sounds. Pulmonary:      Effort: Pulmonary effort is normal.      Breath sounds: Normal breath sounds. Abdominal:      General: Bowel sounds are normal.      Palpations: Abdomen is soft. Musculoskeletal:         General: Normal range of motion.       Cervical back: Normal range of motion. Skin:     General: Skin is warm. Findings: No rash. Neurological:      General: No focal deficit present. Mental Status: She is alert and oriented to person, place, and time. Psychiatric:         Mood and Affect: Mood normal.         Thought Content: Thought content normal.         Lab Results   Component Value Date     02/24/2021    K 4.4 02/24/2021     02/24/2021    CO2 25 02/24/2021    GLUCOSE 77 02/24/2021    BUN 8 02/24/2021    CREATININE 0.6 02/24/2021    CALCIUM 9.2 02/24/2021    PROT 7.6 02/24/2021    LABALBU 4.8 02/24/2021    BILITOT <0.2 02/24/2021    ALT 10 02/24/2021    AST 18 02/24/2021       Microscopic Examination (no units)   Date Value   01/28/2015 Not Indicated     LDL Calculated (MG/DL)   Date Value   02/12/2014 106         Lab Results   Component Value Date    WBC 6.1 02/24/2021    NEUTROABS 7.4 04/25/2020    HGB 11.2 02/24/2021    HCT 39.0 02/24/2021    MCV 82.8 02/24/2021     02/24/2021       Lab Results   Component Value Date    TSH 1.99 09/21/2016         ASSESSMENT/PLAN:     1. [facility]  I advised compliances with GI FUP  - External Referral To Gastroenterology    2. Fibromyalgia  Start 30 mg for a month then increase to 60 mg.  - DULoxetine (CYMBALTA) 30 MG extended release capsule; Take 1 capsule by mouth daily  Dispense: 30 capsule; Refill: 3  - DULoxetine (CYMBALTA) 60 MG extended release capsule; Take 1 capsule by mouth daily  Dispense: 30 capsule;  Refill: 3        Orders Placed This Encounter   Medications    DULoxetine (CYMBALTA) 30 MG extended release capsule     Sig: Take 1 capsule by mouth daily     Dispense:  30 capsule     Refill:  3    DULoxetine (CYMBALTA) 60 MG extended release capsule     Sig: Take 1 capsule by mouth daily     Dispense:  30 capsule     Refill:  3     Take 30 mg for a month then can increase to 60 mg dose

## 2022-05-27 NOTE — PROGRESS NOTES
Chief Complaint   Patient presents with    Crohn's Disease     needs referral Dr Bryn Ferrell @ [facility] [address] clinic    Medication Management       Have you seen any other physician or provider since your last visit no    Have you had any other diagnostic tests since your last visit? no    Have you changed or stopped any medications since your last visit? no     I have recommended that this patient have a immunization for pneumonia but she declines at this time. I have discussed the risks and benefits of this examination with her. The patient verbalizes understanding. Diabetic retinal exam completed this year? No                       * If yes please have patient sign a records release to obtain record to update Health Maintenance                       * If no, please order referral for patient to be scheduled       Patient has a history of Crohn's. She is needing a new referral to [address] [facility] [address] with Dr Bryn Ferrell. Patient has not been seen in over a year and is out of all her medication. She is asking for steroids,Neurontin and her cymbalta to be filled today.

## 2022-05-27 NOTE — PATIENT INSTRUCTIONS
· Keep a list of your medicines with you. List all of the prescription medicines, nonprescription medicines, supplements, natural remedies, and vitamins that you take. Tell your healthcare providers who treat you about all of the products you are taking. Your provider can provide you with a form to keep track of them. Just ask. · Follow the directions that come with your medicine, including information about food or alcohol. Make sure you know how and when to take your medicine. Do not take more or less than you are supposed to take. · Keep all medicines out of the reach of children. · Store medicines according to the directions on the label. · Monitor yourself. Learn to know how your body reacts to your new medicine and keep track of how it makes you feel before attempting (If your provider has allowed you to do so) to drive or go to work. · Seek emergency medical attention if you think you have used too much of this medicine. An overdose of any prescription medicine can be fatal. Overdose symptoms may include extreme drowsiness, muscle weakness, confusion, cold and clammy skin, pinpoint pupils, shallow breathing, slow heart rate, fainting, or coma. · Don't share prescription medicines with others, even when they seem to have the same symptoms. What may be good for you may be harmful to others. · If you are no longer taking a prescribed medication and you have pills left please take your pills out of their original containers. Mix crushed pills with an undesirable substance, such as cat litter or used coffee grounds. Put the mixture into a disposable container with a lid, such as an empty margarine tub, or into a sealable bag. Cover up or remove any of your personal information on the empty containers by covering it with black permanent marker or duct tape. Place the sealed container with the mixture, and the empty drug containers, in the trash.    · If you use a medication that is in the form of a patch, dispose of used patches by folding them in half so that the sticky sides meet, and then flushing them down a toilet. They should not be placed in the household trash where children or pets can find them. · If you have any questions, ask your provider or pharmacist for more information. · Be sure to keep all appointments for provider visits or tests. We are committed to providing you with the best care possible. In order to help us achieve these goals please remember to bring all medications, herbal products, and over the counter supplements with you to each visit. If your provider has ordered testing for you, please be sure to follow up with our office if you have not received results within 7 days after the testing took place. *If you receive a survey after visiting one of our offices, please take time to share your experience concerning your physician office visit. These surveys are confidential and no health information about you is shared. We are eager to improve for you and we are counting on your feedback to help make that happen. ips to Help You Stop Smoking       Cigarette smoking is a preventable cause of death in the [place]. If you have thought about quitting but haven't been able to, here are some reasons why you should and some ways to do it. Here's Why   Quitting smoking now can decrease your risk of getting smoking-related illnesses like:   Heart disease   Stroke   Several types of cancer, including:   Lung   Mouth   Esophagus   Larynx   Bladder   Pancreas   Kidney   Chronic lung diseases:   Bronchitis   Emphysema   Asthma   Cataracts   Macular degeneration   Thyroid conditions   Hearing loss   Erectile dysfunction   Dementia   Osteoporosis   Here's How   Once you've decided to quit smoking, set your target quit date a few weeks away.  In the time leading up to your quit day, try some of these ideas offered by the [address] [place] [address] [place] to help you successfully quit smoking. For the best results, work with your doctor. Together, you can test your lung function and compare the results to those of a nonsmoking person. The results can be given to you as your lung age. Finding out your lung age right after having the test done may help you to stop smoking. Your doctor can also discuss with you all of your options and refer you to smoking-cessation support groups. You may wish to use nicotine replacement (gum, patches, inhaler) or one of the prescription medications that have been shown to increase quit rates and prolong abstinence from smoking. But whatever you and your doctor decide on these matters, it will still be you who decides when an how to quit. Here are some techniques:   Switch Brands   Switch to a brand you find distasteful. Change to a brand that is low in tar and nicotine a couple of weeks before your target quit date. This will help change your smoking behavior. However, do not smoke more cigarettes, inhale them more often or more deeply, or place your fingertips over the holes in the filters. All of these actions will increase your nicotine intake, and the idea is to get your body used to functioning without nicotine. Cut Down the Number of Cigarettes You Smoke   Smoke only half of each cigarette. Each day, postpone the lighting of your first cigarette by one hour. Decide you'll only smoke during odd or even hours of the day. Decide beforehand how many cigarettes you'll smoke during the day. For each additional cigarette, give a dollar to your favorite bart. Change your eating habits to help you cut down. For example, drink milk, which many people consider incompatible with smoking. End meals or snacks with something that won't lead to a cigarette. Reach for a glass of juice instead of a cigarette for a \"pick-me-up. \"   Remember: Cutting down can help you quit, but it's not a substitute for quitting.  If you're down to about seven cigarettes a day, it's time to set your target quit date, and get ready to stick to it. Don't Smoke \"Automatically\"   Smoke only those cigarettes you really want. Catch yourself before you light up a cigarette out of pure habit. Don't empty your ashtrays. This will remind you of how many cigarettes you've smoked each day, and the sight and the smell of stale cigarettes butts will be very unpleasant. Make yourself aware of each cigarette by using the opposite hand or putting cigarettes in an unfamiliar location or a different pocket to break the automatic reach. If you light up many times during the day without even thinking about it, try to look in a mirror each time you put a match to your cigarette. You may decide you don't need it. Make Smoking Inconvenient   Stop buying cigarettes by the carton. Wait until one pack is empty before you buy another. Stop carrying cigarettes with you at home or at work. Make them difficult to get to. Make Smoking Unpleasant   Smoke only under circumstances that aren't especially pleasurable for you. If you like to smoke with others, smoke alone. Turn your chair to an empty corner and focus only on the cigarette you are smoking and all its many negative effects. Collect all your cigarette butts in one large glass container as a visual reminder of the filth made by smoking. Just Before Quitting   Practice going without cigarettes. Don't think of never smoking again. Think of quitting in terms of one day at a time . Tell yourself you won't smoke today, and then don't. Clean your clothes to rid them of the cigarette smell, which can linger a long time. On the Day You Quit   Throw away all your cigarettes and matches. Hide your lighters and ashtrays. Visit the dentist and have your teeth cleaned to get rid of tobacco stains. Notice how nice they look and resolve to keep them that way.    Make a list of things you'd like to buy for yourself or someone else. Estimate the cost in terms of packs of cigarettes, and put the money aside to buy these presents. Keep very busy on the big day. Go to the movies, exercise, take long walks, or go bike riding. Remind your family and friends that this is your quit date, and ask them to help you over the rough spots of the first couple of days and weeks. Buy yourself a treat or do something special to celebrate. Telephone and Internet Support   Telephone, web-, and computer-based programs can offer you the support that you need to quit and to stay smoke-free. You can find many programs online, like the American Lung Association's [place] from Smoking . Immediately After Quitting   Develop a clean, fresh, nonsmoking environment around yourselfat work and at home. Buy yourself flowersyou may be surprised how much you can enjoy their scent now. The first few days after you quit, spend as much free time as possible in places where smoking isn't allowed, such as [address] [place] [address], museums, theaters, department stores, and churches. Drink large quantities of water and fruit juice (but avoid sodas that contain caffeine). Try to avoid alcohol, coffee, and other beverages that you associate with cigarette smoking. Strike up conversation instead of a match for a cigarette. If you miss the sensation of having a cigarette in your hand, play with something elsea pencil, a paper clip, a marble. If you miss having something in your mouth, try toothpicks or a fake cigarette. Thank you for enrolling in [address] Please follow the instructions below to securely access your online medical record. [employer] allows you to send messages to your doctor, view your test results, renew your prescriptions, schedule appointments, and more. How Do I Sign Up? In your Internet browser, go to [email or web address]  Click on the Sign Up Now link in the Sign In box.  You will see the New Member Sign Up page. Enter your TimePoints Access Code exactly as it appears below. You will not need to use this code after youve completed the sign-up process. If you do not sign up before the expiration date, you must request a new code. TimePoints Access Code: Activation code not generated  Current TimePoints Status: Active    Enter your Social Security Number ([id number]) and Date of Birth (mm/dd/yyyy) as indicated and click Submit. You will be taken to the next sign-up page. Create a [employer] ID. This will be your TimePoints login ID and cannot be changed, so think of one that is secure and easy to remember. Create a TimePoints password. You can change your password at any time. Enter your Password Reset Question and Answer. This can be used at a later time if you forget your password. Enter your e-mail address. You will receive e-mail notification when new information is available in [address] Click Sign Up. You can now view your medical record. Additional Information  If you have questions, please contact your physician practice where you receive care. Remember, [employer]Points is NOT to be used for urgent needs. For medical emergencies, dial 911.

## 2022-06-17 ENCOUNTER — OFFICE VISIT (OUTPATIENT)
Dept: PRIMARY CARE CLINIC | Age: 32
End: 2022-06-17
Payer: COMMERCIAL

## 2022-06-17 ENCOUNTER — HOSPITAL ENCOUNTER (OUTPATIENT)
Facility: HOSPITAL | Age: 32
Discharge: HOME | End: 2022-06-17
Payer: COMMERCIAL

## 2022-06-17 VITALS
OXYGEN SATURATION: 99 % | BODY MASS INDEX: 29.92 KG/M2 | WEIGHT: 152.4 LBS | DIASTOLIC BLOOD PRESSURE: 74 MMHG | HEART RATE: 119 BPM | HEIGHT: 60 IN | SYSTOLIC BLOOD PRESSURE: 131 MMHG | RESPIRATION RATE: 14 BRPM | TEMPERATURE: 98.2 F

## 2022-06-17 DIAGNOSIS — F19.11 HISTORY OF DRUG ABUSE (HCC): ICD-10-CM

## 2022-06-17 DIAGNOSIS — R19.7 DIARRHEA, UNSPECIFIED TYPE: ICD-10-CM

## 2022-06-17 DIAGNOSIS — R11.2 NON-INTRACTABLE VOMITING WITH NAUSEA, UNSPECIFIED VOMITING TYPE: Primary | ICD-10-CM

## 2022-06-17 DIAGNOSIS — M79.604 PAIN IN BOTH LOWER EXTREMITIES: ICD-10-CM

## 2022-06-17 DIAGNOSIS — M79.605 PAIN IN BOTH LOWER EXTREMITIES: ICD-10-CM

## 2022-06-17 DIAGNOSIS — E55.9 VITAMIN D DEFICIENCY: ICD-10-CM

## 2022-06-17 DIAGNOSIS — L52 ERYTHEMA NODOSUM: ICD-10-CM

## 2022-06-17 DIAGNOSIS — R11.2 NON-INTRACTABLE VOMITING WITH NAUSEA, UNSPECIFIED VOMITING TYPE: ICD-10-CM

## 2022-06-17 DIAGNOSIS — K50.118 CROHN'S DISEASE OF COLON WITH OTHER COMPLICATION (HCC): ICD-10-CM

## 2022-06-17 LAB
A/G RATIO: 1.3 (ref 0.8–2)
ALBUMIN SERPL-MCNC: 4.2 G/DL (ref 3.4–4.8)
ALP BLD-CCNC: 98 U/L (ref 25–100)
ALT SERPL-CCNC: 10 U/L (ref 4–36)
ANION GAP SERPL CALCULATED.3IONS-SCNC: 12 MMOL/L (ref 3–16)
AST SERPL-CCNC: 14 U/L (ref 8–33)
BILIRUB SERPL-MCNC: <0.2 MG/DL (ref 0.3–1.2)
BUN BLDV-MCNC: 6 MG/DL (ref 6–20)
CALCIUM SERPL-MCNC: 9 MG/DL (ref 8.5–10.5)
CHLORIDE BLD-SCNC: 101 MMOL/L (ref 98–107)
CO2: 23 MMOL/L (ref 20–30)
CREAT SERPL-MCNC: 0.6 MG/DL (ref 0.4–1.2)
FOLATE: 12.23 NG/ML
GFR AFRICAN AMERICAN: >59
GFR NON-AFRICAN AMERICAN: >60
GLOBULIN: 3.3 G/DL
GLUCOSE BLD-MCNC: 82 MG/DL (ref 74–106)
HCT VFR BLD CALC: 35.7 % (ref 37–47)
HEMOGLOBIN: 11 G/DL (ref 11.5–16.5)
MCH RBC QN AUTO: 25.2 PG (ref 27–32)
MCHC RBC AUTO-ENTMCNC: 30.8 G/DL (ref 31–35)
MCV RBC AUTO: 81.9 FL (ref 80–100)
PDW BLD-RTO: 16 % (ref 11–16)
PLATELET # BLD: 391 K/UL (ref 150–400)
PMV BLD AUTO: 9 FL (ref 6–10)
POTASSIUM SERPL-SCNC: 4 MMOL/L (ref 3.4–5.1)
RBC # BLD: 4.36 M/UL (ref 3.8–5.8)
SEDIMENTATION RATE, ERYTHROCYTE: 60 MM/HR (ref 0–20)
SODIUM BLD-SCNC: 136 MMOL/L (ref 136–145)
TOTAL PROTEIN: 7.5 G/DL (ref 6.4–8.3)
TSH SERPL DL<=0.05 MIU/L-ACNC: 0.56 UIU/ML (ref 0.27–4.2)
VITAMIN B-12: 530 PG/ML (ref 211–911)
VITAMIN D 25-HYDROXY: 20.8 (ref 32–100)
WBC # BLD: 10.9 K/UL (ref 4–11)

## 2022-06-17 PROCEDURE — 84443 ASSAY THYROID STIM HORMONE: CPT

## 2022-06-17 PROCEDURE — 36415 COLL VENOUS BLD VENIPUNCTURE: CPT

## 2022-06-17 PROCEDURE — 86703 HIV-1/HIV-2 1 RESULT ANTBDY: CPT | Performed by: NURSE PRACTITIONER

## 2022-06-17 PROCEDURE — 4004F PT TOBACCO SCREEN RCVD TLK: CPT | Performed by: NURSE PRACTITIONER

## 2022-06-17 PROCEDURE — G8417 CALC BMI ABV UP PARAM F/U: HCPCS | Performed by: NURSE PRACTITIONER

## 2022-06-17 PROCEDURE — G8427 DOCREV CUR MEDS BY ELIG CLIN: HCPCS | Performed by: NURSE PRACTITIONER

## 2022-06-17 PROCEDURE — 99214 OFFICE O/P EST MOD 30 MIN: CPT | Performed by: NURSE PRACTITIONER

## 2022-06-17 PROCEDURE — 87340 HEPATITIS B SURFACE AG IA: CPT

## 2022-06-17 PROCEDURE — 80053 COMPREHEN METABOLIC PANEL: CPT

## 2022-06-17 PROCEDURE — 85652 RBC SED RATE AUTOMATED: CPT

## 2022-06-17 PROCEDURE — 86709 HEPATITIS A IGM ANTIBODY: CPT

## 2022-06-17 PROCEDURE — 86705 HEP B CORE ANTIBODY IGM: CPT

## 2022-06-17 PROCEDURE — 86803 HEPATITIS C AB TEST: CPT

## 2022-06-17 PROCEDURE — 82607 VITAMIN B-12: CPT

## 2022-06-17 PROCEDURE — 85027 COMPLETE CBC AUTOMATED: CPT

## 2022-06-17 PROCEDURE — 82746 ASSAY OF FOLIC ACID SERUM: CPT

## 2022-06-17 PROCEDURE — 82306 VITAMIN D 25 HYDROXY: CPT

## 2022-06-17 RX ORDER — PREDNISONE 20 MG/1
TABLET ORAL
Qty: 18 TABLET | Refills: 0 | Status: SHIPPED | OUTPATIENT
Start: 2022-06-17 | End: 2022-07-22 | Stop reason: ALTCHOICE

## 2022-06-17 RX ORDER — GABAPENTIN 300 MG/1
300 CAPSULE ORAL 2 TIMES DAILY
Qty: 60 CAPSULE | Refills: 0 | Status: SHIPPED | OUTPATIENT
Start: 2022-06-17 | End: 2022-07-22

## 2022-06-17 ASSESSMENT — ENCOUNTER SYMPTOMS
RESPIRATORY NEGATIVE: 1
EYES NEGATIVE: 1
ALLERGIC/IMMUNOLOGIC NEGATIVE: 1
GASTROINTESTINAL NEGATIVE: 1

## 2022-06-17 NOTE — PROGRESS NOTES
Chief Complaint   Patient presents with    Crohn's Disease     appointment with specialist is September     Leg Pain     burning        Have you seen any other physician or provider since your last visit yes - imani    Have you had any other diagnostic tests since your last visit? no    Have you changed or stopped any medications since your last visit? no     I have recommended that this patient have a immunization for pneumonia but she declines at this time. I have discussed the risks and benefits of this examination with her. The patient verbalizes understanding. SUBJECTIVE:    Patient Bishop Armstrong is a 36 y.o. female. Chief Complaint   Patient presents with    Crohn's Disease     appointment with specialist is September     Leg Pain     burning      HPI:  Patient is here to follow up on her Crohn's disease. She has not had her medication or seen a specialist for over 3 years. She went through a substance abuses program.  She got charged with Meth procession and dui. She is c/o bilateral leg pain. She was given Cymbalta RX when she was seen by [address] [place] [address] but did not pick it up. She is asking for Neurontin for her bilateral legs pain. Her Nodosa is better. She got out of [facility] last year. She was in for 13 months. She says she is clean. She is living with her Dad. She is trying to get a place to live. She does not  receives child support for her 8year old or her 3year old. She has been working since Dec. 16. She has saved enough to buy a car. She get her license back and is trying to get a place to live. She is going to get the custody back of he children. She does have an appt Sept 15 with her Crohn's specialist.  Shayla Gamez. Bill Tolentino. She is off the Imuran. Patient's medications, allergies, past medical, surgical, social and family histories were reviewed and updated as appropriate. Review of Systems   Constitutional: Negative.     HENT: Negative. Eyes: Negative. Respiratory: Negative. Cardiovascular: Positive for leg swelling. Gastrointestinal: Negative. Endocrine: Negative. Genitourinary: Negative. Musculoskeletal: Positive for arthralgias. Skin: Negative. Allergic/Immunologic: Negative. Neurological: Negative. Hematological: Negative. Psychiatric/Behavioral: Negative. OBJECTIVE:  /74 (Site: Left Upper Arm, Position: Sitting, Cuff Size: Medium Adult)   Pulse (!) 119   Temp 98.2 °F (36.8 °C) (Temporal)   Resp 14   Ht 5' (1.524 m)   Wt 152 lb 6.4 oz (69.1 kg)   SpO2 99% Comment: room air  BMI 29.76 kg/m²    Physical Exam  Vitals and nursing note reviewed. Constitutional:       Appearance: She is well-developed. HENT:      Head: Normocephalic and atraumatic. Eyes:      Conjunctiva/sclera: Conjunctivae normal.      Pupils: Pupils are equal, round, and reactive to light. Neck:      Thyroid: No thyromegaly. Vascular: No JVD. Cardiovascular:      Rate and Rhythm: Normal rate and regular rhythm. Heart sounds: No murmur heard. No friction rub. No gallop. Pulmonary:      Effort: Pulmonary effort is normal. No respiratory distress. Breath sounds: Normal breath sounds. No wheezing or rales. Abdominal:      General: Bowel sounds are normal. There is no distension. Palpations: Abdomen is soft. Tenderness: There is no abdominal tenderness. There is no guarding. Musculoskeletal:         General: No tenderness. Cervical back: Normal range of motion and neck supple. Skin:     General: Skin is warm and dry. Findings: No rash. Neurological:      Mental Status: She is alert and oriented to person, place, and time. Psychiatric:         Judgment: Judgment normal.         No results found for requested labs within last 30 days.        Microscopic Examination (no units)   Date Value   01/28/2015 Not Indicated     LDL Calculated (MG/DL)   Date Value   02/12/2014 106         Lab Results   Component Value Date    WBC 6.1 02/24/2021    NEUTROABS 7.4 04/25/2020    HGB 11.2 02/24/2021    HCT 39.0 02/24/2021    MCV 82.8 02/24/2021     02/24/2021       Lab Results   Component Value Date    TSH 1.99 09/21/2016         ASSESSMENT/PLAN:     1. Crohn's disease of colon with other complication ([facility]    - predniSONE (DELTASONE) 20 MG tablet; One po bid for 5 days, then 1 po daily for 5 days then 1/2 po daily for 5 days. Dispense: 18 tablet; Refill: 0    2. Erythema nodosum    - predniSONE (DELTASONE) 20 MG tablet; One po bid for 5 days, then 1 po daily for 5 days then 1/2 po daily for 5 days. Dispense: 18 tablet; Refill: 0    3. Pain in both lower extremities  Urine drug screen. medication agreement  - gabapentin (NEURONTIN) 300 MG capsule; Take 1 capsule by mouth 2 times daily for 30 days. Dispense: 60 capsule; Refill: 0    4. Non-intractable vomiting with nausea, unspecified vomiting type    - Comprehensive Metabolic Panel; Future  - CBC; Future  - TSH; Future  - Sedimentation Rate; Future  - Hepatitis A Antibody, IgM  - Hepatitis B Core Antibody, IgM  - Hepatitis B Surface Antigen  - Hepatitis C Antibody  - Urine Drug Screen; Future  - POCT HIV-1 and HIV-2 antibodies    5. Diarrhea, unspecified type      6. History of drug abuse ([facility].)    - Sedimentation Rate; Future  - Hepatitis A Antibody, IgM  - Hepatitis B Core Antibody, IgM  - Hepatitis B Surface Antigen  - Hepatitis C Antibody  - Urine Drug Screen; Future  - POCT HIV-1 and HIV-2 antibodies    7. Vitamin D deficiency    - Vitamin D 25 Hydroxy; Future  - Vitamin B12 & Folate;  Future       Written by Desirae Mejia FRANC, acting as a scribe for Benito Lamar on 6/17/2022 at 4:34 PM.

## 2022-06-19 LAB
HAV IGM SER IA-ACNC: NORMAL
HEPATITIS B CORE IGM ANTIBODY: NORMAL
HEPATITIS B SURFACE ANTIGEN INTERPRETATION: NORMAL
HEPATITIS C ANTIBODY INTERPRETATION: REACTIVE

## 2022-06-20 DIAGNOSIS — R76.8 POSITIVE HEPATITIS C ANTIBODY TEST: Primary | ICD-10-CM

## 2022-07-22 ENCOUNTER — OFFICE VISIT (OUTPATIENT)
Dept: PRIMARY CARE CLINIC | Age: 32
End: 2022-07-22
Payer: COMMERCIAL

## 2022-07-22 VITALS
RESPIRATION RATE: 14 BRPM | BODY MASS INDEX: 28.98 KG/M2 | WEIGHT: 147.6 LBS | HEIGHT: 60 IN | DIASTOLIC BLOOD PRESSURE: 80 MMHG | OXYGEN SATURATION: 99 % | SYSTOLIC BLOOD PRESSURE: 112 MMHG | TEMPERATURE: 98 F | HEART RATE: 71 BPM

## 2022-07-22 DIAGNOSIS — M79.7 FIBROMYALGIA: Primary | ICD-10-CM

## 2022-07-22 DIAGNOSIS — Z87.19 HISTORY OF CROHN'S DISEASE: ICD-10-CM

## 2022-07-22 DIAGNOSIS — M79.604 PAIN IN BOTH LOWER EXTREMITIES: ICD-10-CM

## 2022-07-22 DIAGNOSIS — F11.11 HISTORY OF OPIOID ABUSE (HCC): ICD-10-CM

## 2022-07-22 DIAGNOSIS — M79.605 PAIN IN BOTH LOWER EXTREMITIES: ICD-10-CM

## 2022-07-22 PROCEDURE — G8417 CALC BMI ABV UP PARAM F/U: HCPCS | Performed by: NURSE PRACTITIONER

## 2022-07-22 PROCEDURE — G8427 DOCREV CUR MEDS BY ELIG CLIN: HCPCS | Performed by: NURSE PRACTITIONER

## 2022-07-22 PROCEDURE — 99214 OFFICE O/P EST MOD 30 MIN: CPT | Performed by: NURSE PRACTITIONER

## 2022-07-22 PROCEDURE — 4004F PT TOBACCO SCREEN RCVD TLK: CPT | Performed by: NURSE PRACTITIONER

## 2022-07-22 RX ORDER — DULOXETIN HYDROCHLORIDE 60 MG/1
60 CAPSULE, DELAYED RELEASE ORAL DAILY
Qty: 30 CAPSULE | Refills: 3 | Status: SHIPPED | OUTPATIENT
Start: 2022-07-22 | End: 2022-08-26 | Stop reason: SDUPTHER

## 2022-07-22 RX ORDER — GABAPENTIN 300 MG/1
300 CAPSULE ORAL 2 TIMES DAILY
Qty: 60 CAPSULE | Refills: 0 | Status: SHIPPED | OUTPATIENT
Start: 2022-07-22 | End: 2022-08-26 | Stop reason: SDUPTHER

## 2022-07-22 ASSESSMENT — ENCOUNTER SYMPTOMS
ALLERGIC/IMMUNOLOGIC NEGATIVE: 1
EYES NEGATIVE: 1
GASTROINTESTINAL NEGATIVE: 1
RESPIRATORY NEGATIVE: 1

## 2022-07-22 NOTE — PROGRESS NOTES
Neurological: Negative. Hematological: Negative. Psychiatric/Behavioral: Negative. OBJECTIVE:  /80 (Site: Right Upper Arm, Position: Sitting, Cuff Size: Medium Adult)   Pulse 71   Temp 98 °F (36.7 °C)   Resp 14   Ht 5' (1.524 m)   Wt 147 lb 9.6 oz (67 kg)   SpO2 99% Comment: room air  BMI 28.83 kg/m²    Physical Exam  Vitals and nursing note reviewed. Constitutional:       Appearance: She is well-developed. HENT:      Head: Normocephalic and atraumatic. Eyes:      Conjunctiva/sclera: Conjunctivae normal.      Pupils: Pupils are equal, round, and reactive to light. Neck:      Thyroid: No thyromegaly. Vascular: No JVD. Cardiovascular:      Rate and Rhythm: Normal rate and regular rhythm. Heart sounds: No murmur heard. No friction rub. No gallop. Pulmonary:      Effort: Pulmonary effort is normal. No respiratory distress. Breath sounds: Normal breath sounds. No wheezing or rales. Abdominal:      General: Bowel sounds are normal. There is no distension. Palpations: Abdomen is soft. Tenderness: There is no abdominal tenderness. There is no guarding. Musculoskeletal:         General: No tenderness. Cervical back: Normal range of motion and neck supple. Skin:     General: Skin is warm and dry. Findings: No rash. Neurological:      Mental Status: She is alert and oriented to person, place, and time. Psychiatric:         Judgment: Judgment normal.       No results found for requested labs within last 30 days.        Microscopic Examination (no units)   Date Value   01/28/2015 Not Indicated     LDL Calculated (MG/DL)   Date Value   02/12/2014 106         Lab Results   Component Value Date/Time    WBC 10.9 06/17/2022 05:25 PM    NEUTROABS 7.4 04/25/2020 03:05 AM    HGB 11.0 06/17/2022 05:25 PM    HCT 35.7 06/17/2022 05:25 PM    MCV 81.9 06/17/2022 05:25 PM     06/17/2022 05:25 PM       Lab Results   Component Value Date    TSH 0.56 06/17/2022         ASSESSMENT/PLAN:     1. Fibromyalgia  Increase cymbalta    she wants increase of Neurotnin but decline due to drug abuse potentioal.   - DULoxetine (CYMBALTA) 60 MG extended release capsule; Take 1 capsule by mouth in the morning. Dispense: 30 capsule; Refill: 3    2.  History of Crohn's disease  Stelara   Follow with specialist.   3. History of opioid abuse ([facility]       Written by Kristi NEW, acting as a scribe for Norfanta Newton on 7/22/2022 at 4:45 PM.

## 2022-08-26 ENCOUNTER — OFFICE VISIT (OUTPATIENT)
Dept: PRIMARY CARE CLINIC | Age: 32
End: 2022-08-26
Payer: COMMERCIAL

## 2022-08-26 ENCOUNTER — TELEPHONE (OUTPATIENT)
Dept: PRIMARY CARE CLINIC | Age: 32
End: 2022-08-26

## 2022-08-26 VITALS
TEMPERATURE: 98 F | DIASTOLIC BLOOD PRESSURE: 60 MMHG | WEIGHT: 141.6 LBS | RESPIRATION RATE: 14 BRPM | OXYGEN SATURATION: 98 % | HEART RATE: 107 BPM | SYSTOLIC BLOOD PRESSURE: 90 MMHG | HEIGHT: 60 IN | BODY MASS INDEX: 27.8 KG/M2

## 2022-08-26 DIAGNOSIS — M79.7 FIBROMYALGIA: ICD-10-CM

## 2022-08-26 DIAGNOSIS — M79.605 PAIN IN BOTH LOWER EXTREMITIES: ICD-10-CM

## 2022-08-26 DIAGNOSIS — M79.604 PAIN IN BOTH LOWER EXTREMITIES: ICD-10-CM

## 2022-08-26 PROCEDURE — 99214 OFFICE O/P EST MOD 30 MIN: CPT | Performed by: NURSE PRACTITIONER

## 2022-08-26 PROCEDURE — 4004F PT TOBACCO SCREEN RCVD TLK: CPT | Performed by: NURSE PRACTITIONER

## 2022-08-26 PROCEDURE — G8417 CALC BMI ABV UP PARAM F/U: HCPCS | Performed by: NURSE PRACTITIONER

## 2022-08-26 PROCEDURE — G8427 DOCREV CUR MEDS BY ELIG CLIN: HCPCS | Performed by: NURSE PRACTITIONER

## 2022-08-26 RX ORDER — DULOXETIN HYDROCHLORIDE 60 MG/1
60 CAPSULE, DELAYED RELEASE ORAL DAILY
Qty: 30 CAPSULE | Refills: 3 | Status: SHIPPED | OUTPATIENT
Start: 2022-08-26

## 2022-08-26 RX ORDER — GABAPENTIN 300 MG/1
300 CAPSULE ORAL 3 TIMES DAILY
Qty: 90 CAPSULE | Refills: 0 | Status: SHIPPED | OUTPATIENT
Start: 2022-08-26 | End: 2022-09-26

## 2022-08-26 ASSESSMENT — ENCOUNTER SYMPTOMS
ALLERGIC/IMMUNOLOGIC NEGATIVE: 1
RESPIRATORY NEGATIVE: 1
GASTROINTESTINAL NEGATIVE: 1
EYES NEGATIVE: 1

## 2022-08-26 NOTE — PATIENT INSTRUCTIONS
patches by folding them in half so that the sticky sides meet, and then flushing them down a toilet. They should not be placed in the household trash where children or pets can find them. If you have any questions, ask your provider or pharmacist for more information. Be sure to keep all appointments for provider visits or tests. We are committed to providing you with the best care possible. In order to help us achieve these goals please remember to bring all medications, herbal products, and over the counter supplements with you to each visit. If your provider has ordered testing for you, please be sure to follow up with our office if you have not received results within 7 days after the testing took place. *If you receive a survey after visiting one of our offices, please take time to share your experience concerning your physician office visit. These surveys are confidential and no health information about you is shared. We are eager to improve for you and we are counting on your feedback to help make that happen. ips to Help You Stop Smoking       Cigarette smoking is a preventable cause of death in the [place]. If you have thought about quitting but haven't been able to, here are some reasons why you should and some ways to do it. Here's Why   Quitting smoking now can decrease your risk of getting smoking-related illnesses like:   Heart disease   Stroke   Several types of cancer, including:   Lung   Mouth   Esophagus   Larynx   Bladder   Pancreas   Kidney   Chronic lung diseases:   Bronchitis   Emphysema   Asthma   Cataracts   Macular degeneration   Thyroid conditions   Hearing loss   Erectile dysfunction   Dementia   Osteoporosis   Here's How   Once you've decided to quit smoking, set your target quit date a few weeks away.  In the time leading up to your quit day, try some of these ideas offered by the [address] [place] [address] of the [employer] to help you successfully quit smoking. For the best results, work with your doctor. Together, you can test your lung function and compare the results to those of a nonsmoking person. The results can be given to you as your lung age. Finding out your lung age right after having the test done may help you to stop smoking. Your doctor can also discuss with you all of your options and refer you to smoking-cessation support groups. You may wish to use nicotine replacement (gum, patches, inhaler) or one of the prescription medications that have been shown to increase quit rates and prolong abstinence from smoking. But whatever you and your doctor decide on these matters, it will still be you who decides when an how to quit. Here are some techniques:   Switch Brands   Switch to a brand you find distasteful. Change to a brand that is low in tar and nicotine a couple of weeks before your target quit date. This will help change your smoking behavior. However, do not smoke more cigarettes, inhale them more often or more deeply, or place your fingertips over the holes in the filters. All of these actions will increase your nicotine intake, and the idea is to get your body used to functioning without nicotine. Cut Down the Number of Cigarettes You Smoke   Smoke only half of each cigarette. Each day, postpone the lighting of your first cigarette by one hour. Decide you'll only smoke during odd or even hours of the day. Decide beforehand how many cigarettes you'll smoke during the day. For each additional cigarette, give a dollar to your favorite bart. Change your eating habits to help you cut down. For example, drink milk, which many people consider incompatible with smoking. End meals or snacks with something that won't lead to a cigarette. Reach for a glass of juice instead of a cigarette for a \"pick-me-up. \"   Remember: Cutting down can help you quit, but it's not a substitute for quitting.  If you're down to about else. Estimate the cost in terms of packs of cigarettes, and put the money aside to buy these presents. Keep very busy on the big day. Go to the movies, exercise, take long walks, or go bike riding. Remind your family and friends that this is your quit date, and ask them to help you over the rough spots of the first couple of days and weeks. Buy yourself a treat or do something special to celebrate. Telephone and Internet Support   Telephone, web-, and computer-based programs can offer you the support that you need to quit and to stay smoke-free. You can find many programs online, like the American Lung Association's [place] from Smoking . Immediately After Quitting   Develop a clean, fresh, nonsmoking environment around yourselfat work and at home. Buy yourself flowersyou may be surprised how much you can enjoy their scent now. The first few days after you quit, spend as much free time as possible in places where smoking isn't allowed, such as [address] [place] [address], museums, theaters, department stores, and churches. Drink large quantities of water and fruit juice (but avoid sodas that contain caffeine). Try to avoid alcohol, coffee, and other beverages that you associate with cigarette smoking. Strike up conversation instead of a match for a cigarette. If you miss the sensation of having a cigarette in your hand, play with something elsea pencil, a paper clip, a marble. If you miss having something in your mouth, try toothpicks or a fake cigarette.

## 2022-08-26 NOTE — PROGRESS NOTES
Psychiatric/Behavioral: Negative. OBJECTIVE:  BP 90/60 (Site: Right Upper Arm, Position: Sitting, Cuff Size: Medium Adult)   Pulse (!) 107   Temp 98 °F (36.7 °C) (Temporal)   Resp 14   Ht 5' (1.524 m)   Wt 141 lb 9.6 oz (64.2 kg)   SpO2 98% Comment: room air  BMI 27.65 kg/m²    Physical Exam  Vitals and nursing note reviewed. Constitutional:       Appearance: She is well-developed. HENT:      Head: Normocephalic and atraumatic. Eyes:      Conjunctiva/sclera: Conjunctivae normal.      Pupils: Pupils are equal, round, and reactive to light. Neck:      Thyroid: No thyromegaly. Vascular: No JVD. Cardiovascular:      Rate and Rhythm: Normal rate and regular rhythm. Heart sounds: No murmur heard. No friction rub. No gallop. Pulmonary:      Effort: Pulmonary effort is normal. No respiratory distress. Breath sounds: Normal breath sounds. No wheezing or rales. Abdominal:      General: Bowel sounds are normal. There is no distension. Palpations: Abdomen is soft. Tenderness: There is no abdominal tenderness. There is no guarding. Musculoskeletal:      Cervical back: Normal range of motion and neck supple. Right lower leg: Tenderness present. Left lower leg: Tenderness present. Skin:     General: Skin is warm and dry. Findings: No rash. Neurological:      Mental Status: She is alert and oriented to person, place, and time. Psychiatric:         Judgment: Judgment normal.       No results found for requested labs within last 30 days.        Microscopic Examination (no units)   Date Value   01/28/2015 Not Indicated     LDL Calculated (MG/DL)   Date Value   02/12/2014 106         Lab Results   Component Value Date/Time    WBC 10.9 06/17/2022 05:25 PM    NEUTROABS 7.4 04/25/2020 03:05 AM    HGB 11.0 06/17/2022 05:25 PM    HCT 35.7 06/17/2022 05:25 PM    MCV 81.9 06/17/2022 05:25 PM     06/17/2022 05:25 PM       Lab Results   Component Value Date TSH 0.56 06/17/2022         ASSESSMENT/PLAN:     1. Pain in both lower extremities  Increase in dose to 3 times a day. - gabapentin (NEURONTIN) 300 MG capsule; Take 1 capsule by mouth 3 times daily for 30 days. Dispense: 90 capsule; Refill: 0    2. Fibromyalgia  Continue Cymbalta. - DULoxetine (CYMBALTA) 60 MG extended release capsule; Take 1 capsule by mouth daily  Dispense: 30 capsule;  Refill: 3     Written by Jaswinder NEW, acting as a scribe for Meryl Garrison on 8/26/2022 at 12:24 PM.

## 2022-08-26 NOTE — TELEPHONE ENCOUNTER
----- Message from Monie Jen sent at 8/26/2022 12:37 PM EDT -----  Subject: Message to Provider    QUESTIONS  Information for Provider? Pt calling to see if insurance will cover hair   treatment? ?  ---------------------------------------------------------------------------  --------------  Ines YE  [phone]; OK to leave message on voicemail  ---------------------------------------------------------------------------  --------------  SCRIPT ANSWERS  Relationship to Patient?  Self

## 2022-08-29 ENCOUNTER — TELEPHONE (OUTPATIENT)
Dept: PRIMARY CARE CLINIC | Age: 32
End: 2022-08-29

## 2022-09-02 RX ORDER — MALATHION 0 G/ML
LOTION TOPICAL
Qty: 1 EACH | Refills: 1 | Status: SHIPPED | OUTPATIENT
Start: 2022-09-02 | End: 2022-09-05

## 2022-09-24 DIAGNOSIS — M79.605 PAIN IN BOTH LOWER EXTREMITIES: ICD-10-CM

## 2022-09-24 DIAGNOSIS — M79.604 PAIN IN BOTH LOWER EXTREMITIES: ICD-10-CM

## 2022-09-26 RX ORDER — GABAPENTIN 300 MG/1
CAPSULE ORAL
Qty: 90 CAPSULE | Refills: 1 | Status: SHIPPED | OUTPATIENT
Start: 2022-09-26 | End: 2022-10-26

## 2022-10-13 PROBLEM — K50.90 CROHN DISEASE (HCC): Status: ACTIVE | Noted: 2022-10-13

## 2022-10-21 ENCOUNTER — HOSPITAL ENCOUNTER (OUTPATIENT)
Dept: INFUSION THERAPY | Facility: HOSPITAL | Age: 32
Setting detail: INFUSION SERIES
Discharge: HOME | End: 2022-10-21

## 2022-10-21 VITALS
HEART RATE: 107 BPM | DIASTOLIC BLOOD PRESSURE: 79 MMHG | OXYGEN SATURATION: 98 % | SYSTOLIC BLOOD PRESSURE: 121 MMHG | RESPIRATION RATE: 16 BRPM | BODY MASS INDEX: 28.32 KG/M2 | TEMPERATURE: 98.2 F | WEIGHT: 145 LBS

## 2022-10-21 DIAGNOSIS — K50.90 CROHN'S DISEASE WITHOUT COMPLICATION, UNSPECIFIED GASTROINTESTINAL TRACT LOCATION: Primary | ICD-10-CM

## 2022-10-21 PROCEDURE — 96413 CHEMO IV INFUSION 1 HR: CPT

## 2022-10-21 PROCEDURE — 96365 THER/PROPH/DIAG IV INF INIT: CPT

## 2022-10-21 PROCEDURE — 25010000002 USTEKINUMAB 130 MG/26ML SOLUTION 26 ML VIAL: Performed by: INTERNAL MEDICINE

## 2022-10-21 RX ORDER — FAMOTIDINE 10 MG/ML
20 INJECTION, SOLUTION INTRAVENOUS ONCE AS NEEDED
Status: DISCONTINUED | OUTPATIENT
Start: 2022-10-21 | End: 2022-10-23 | Stop reason: HOSPADM

## 2022-10-21 RX ORDER — DIPHENHYDRAMINE HYDROCHLORIDE 50 MG/ML
50 INJECTION INTRAMUSCULAR; INTRAVENOUS ONCE AS NEEDED
Start: 2022-10-21

## 2022-10-21 RX ORDER — FAMOTIDINE 10 MG/ML
20 INJECTION, SOLUTION INTRAVENOUS ONCE AS NEEDED
Start: 2022-10-21

## 2022-10-21 RX ORDER — DIPHENHYDRAMINE HYDROCHLORIDE 50 MG/ML
50 INJECTION INTRAMUSCULAR; INTRAVENOUS ONCE AS NEEDED
Status: DISCONTINUED | OUTPATIENT
Start: 2022-10-21 | End: 2022-10-23 | Stop reason: HOSPADM

## 2022-10-21 RX ORDER — ALBUTEROL SULFATE 90 UG/1
2 AEROSOL, METERED RESPIRATORY (INHALATION) EVERY 4 HOURS PRN
Status: DISCONTINUED | OUTPATIENT
Start: 2022-10-21 | End: 2022-10-23 | Stop reason: HOSPADM

## 2022-10-21 RX ORDER — ALBUTEROL SULFATE 90 UG/1
2 AEROSOL, METERED RESPIRATORY (INHALATION) EVERY 4 HOURS PRN
Start: 2022-10-21

## 2022-10-21 RX ORDER — EPINEPHRINE 0.1 MG/ML
0.1 SYRINGE (ML) INJECTION ONCE AS NEEDED
Start: 2022-10-21

## 2022-10-21 RX ORDER — EPINEPHRINE 0.1 MG/ML
0.1 SYRINGE (ML) INJECTION ONCE AS NEEDED
Status: DISCONTINUED | OUTPATIENT
Start: 2022-10-21 | End: 2022-10-23 | Stop reason: HOSPADM

## 2022-10-21 RX ADMIN — USTEKINUMAB 390 MG: 130 SOLUTION INTRAVENOUS at 15:10

## 2022-10-24 DIAGNOSIS — K50.90 CROHN'S DISEASE WITHOUT COMPLICATION, UNSPECIFIED GASTROINTESTINAL TRACT LOCATION (HCC): ICD-10-CM

## 2022-10-24 RX ORDER — SODIUM CHLORIDE 9 MG/ML
INJECTION, SOLUTION INTRAVENOUS CONTINUOUS
OUTPATIENT
Start: 2022-10-25

## 2022-10-24 RX ORDER — DIPHENHYDRAMINE HYDROCHLORIDE 50 MG/ML
50 INJECTION INTRAMUSCULAR; INTRAVENOUS
OUTPATIENT
Start: 2022-10-25

## 2022-10-24 RX ORDER — ALBUTEROL SULFATE 90 UG/1
4 AEROSOL, METERED RESPIRATORY (INHALATION) PRN
OUTPATIENT
Start: 2022-10-25

## 2022-10-24 RX ORDER — SODIUM CHLORIDE 9 MG/ML
5-250 INJECTION, SOLUTION INTRAVENOUS PRN
OUTPATIENT
Start: 2022-10-25

## 2022-10-24 RX ORDER — SODIUM CHLORIDE 0.9 % (FLUSH) 0.9 %
5-40 SYRINGE (ML) INJECTION PRN
OUTPATIENT
Start: 2022-10-25

## 2022-10-31 ENCOUNTER — TELEPHONE (OUTPATIENT)
Dept: PRIMARY CARE CLINIC | Age: 32
End: 2022-10-31

## 2022-10-31 NOTE — TELEPHONE ENCOUNTER
----- Message from [employer] sent at 10/31/2022 12:58 PM EDT -----  Subject: Appointment Request    Reason for Call: Established Patient Appointment needed: Routine Existing   Condition Follow Up    QUESTIONS    Reason for appointment request? Available appointments did not meet   patient need     Additional Information for Provider? Patient only has 11/3 and 1/4 off   work. Would like to be seen for recurring spot on leg.  Patient is   requesting antibiotics.  ---------------------------------------------------------------------------  --------------  Glo Negron [employer]XX  [phone]; Do not leave any message, patient will call back for answer  ---------------------------------------------------------------------------  --------------  SCRIPT ANSWERS  TEMOID Screen: Waldo Eid

## 2022-10-31 NOTE — TELEPHONE ENCOUNTER
Called pt to offer appt for spots on her legs to accommodate her schedule on 11/3. I explained [employer] doesn't work on Thursdays but offered an appt with [facility]. She asked if Susie Lee would prescribe her controlled meds, I said no she would need to see her PCP for that. She declined 11/3. I offered Lacie's first available appt on 11/22. She declined and said it was too late because it was after peak season at work started. I offered 11/3 again, she declined again. She got upset that she could not get her controlled meds refilled and her legs seen at the same time that day and hung up.

## 2022-11-28 ENCOUNTER — OFFICE VISIT (OUTPATIENT)
Dept: PRIMARY CARE CLINIC | Age: 32
End: 2022-11-28
Payer: COMMERCIAL

## 2022-11-28 VITALS
SYSTOLIC BLOOD PRESSURE: 100 MMHG | HEIGHT: 60 IN | DIASTOLIC BLOOD PRESSURE: 60 MMHG | RESPIRATION RATE: 14 BRPM | HEART RATE: 65 BPM | WEIGHT: 128 LBS | OXYGEN SATURATION: 96 % | BODY MASS INDEX: 25.13 KG/M2 | TEMPERATURE: 96.9 F

## 2022-11-28 DIAGNOSIS — M79.2 NEUROPATHIC PAIN: ICD-10-CM

## 2022-11-28 DIAGNOSIS — M79.604 PAIN IN BOTH LOWER EXTREMITIES: ICD-10-CM

## 2022-11-28 DIAGNOSIS — M79.7 FIBROMYALGIA: Primary | ICD-10-CM

## 2022-11-28 DIAGNOSIS — K50.118 CROHN'S DISEASE OF COLON WITH OTHER COMPLICATION (HCC): ICD-10-CM

## 2022-11-28 DIAGNOSIS — M79.605 PAIN IN BOTH LOWER EXTREMITIES: ICD-10-CM

## 2022-11-28 PROCEDURE — G8484 FLU IMMUNIZE NO ADMIN: HCPCS | Performed by: NURSE PRACTITIONER

## 2022-11-28 PROCEDURE — 99214 OFFICE O/P EST MOD 30 MIN: CPT | Performed by: NURSE PRACTITIONER

## 2022-11-28 PROCEDURE — 4004F PT TOBACCO SCREEN RCVD TLK: CPT | Performed by: NURSE PRACTITIONER

## 2022-11-28 PROCEDURE — G8427 DOCREV CUR MEDS BY ELIG CLIN: HCPCS | Performed by: NURSE PRACTITIONER

## 2022-11-28 PROCEDURE — G8417 CALC BMI ABV UP PARAM F/U: HCPCS | Performed by: NURSE PRACTITIONER

## 2022-11-28 RX ORDER — GABAPENTIN 600 MG/1
600 TABLET ORAL 2 TIMES DAILY
Qty: 60 TABLET | Refills: 2 | Status: SHIPPED | OUTPATIENT
Start: 2022-11-28 | End: 2022-12-28

## 2022-11-28 ASSESSMENT — ENCOUNTER SYMPTOMS
RESPIRATORY NEGATIVE: 1
EYES NEGATIVE: 1
GASTROINTESTINAL NEGATIVE: 1
ALLERGIC/IMMUNOLOGIC NEGATIVE: 1

## 2022-11-28 NOTE — PROGRESS NOTES
Chief Complaint   Patient presents with    Crohn's Disease    Chronic Pain       Have you seen any other physician or provider since your last visit yes - [facility]    Have you had any other diagnostic tests since your last visit? no    Have you changed or stopped any medications since your last visit? no     I have recommended that this patient have a immunization for pneumonia but she due to refusal reason: not comfortable with test   I have discussed the risks and benefits of this examination with her. The patient verbalizes understanding. Provider will be informed of refusal.      Diabetic retinal exam completed this year? No                       * If yes please have patient sign a records release to obtain record to update Health Maintenance                       * If no, please order referral for patient to be scheduled     SUBJECTIVE:    Patient Siri Buckner is a 29 y.o. female. Chief Complaint   Patient presents with    Crohn's Disease    Chronic Pain     HPI:  Patient is here to follow up on chronic pain from Fibromyalgia and Crohn's. She had a colonoscopy today at [facility] with biopsies. She was told her Crohn's has gotten worse. At this time continue on the Stelara. She takes Neurontin for her leg pain but she states that the 300 mg is not working very well. Patient's medications, allergies, past medical, surgical, social and family histories were reviewed and updated as appropriate. Review of Systems   Constitutional: Negative. HENT: Negative. Eyes: Negative. Respiratory: Negative. Cardiovascular: Negative. Gastrointestinal: Negative. Endocrine: Negative. Genitourinary: Negative. Musculoskeletal:  Positive for arthralgias. Skin: Negative. Allergic/Immunologic: Negative. Neurological: Negative. Hematological: Negative. Psychiatric/Behavioral: Negative.        OBJECTIVE:  /60 (Site: Left Upper Arm, Position: Sitting, Cuff Size: Medium Adult)   Pulse 65   Temp 96.9 °F (36.1 °C) (Temporal)   Resp 14   Ht 5' (1.524 m)   Wt 128 lb (58.1 kg)   SpO2 96% Comment: room air  BMI 25.00 kg/m²    Physical Exam  Vitals and nursing note reviewed. Constitutional:       Appearance: She is well-developed. HENT:      Head: Normocephalic and atraumatic. Eyes:      Conjunctiva/sclera: Conjunctivae normal.      Pupils: Pupils are equal, round, and reactive to light. Neck:      Thyroid: No thyromegaly. Vascular: No JVD. Cardiovascular:      Rate and Rhythm: Normal rate and regular rhythm. Heart sounds: No murmur heard. No friction rub. No gallop. Pulmonary:      Effort: Pulmonary effort is normal. No respiratory distress. Breath sounds: Normal breath sounds. No wheezing or rales. Abdominal:      General: Bowel sounds are normal. There is no distension. Palpations: Abdomen is soft. Tenderness: There is no abdominal tenderness. There is no guarding. Musculoskeletal:         General: No tenderness. Cervical back: Normal range of motion and neck supple. Skin:     General: Skin is warm and dry. Findings: No rash. Neurological:      Mental Status: She is alert and oriented to person, place, and time. Psychiatric:         Judgment: Judgment normal.       No results found for requested labs within last 30 days. Microscopic Examination (no units)   Date Value   01/28/2015 Not Indicated     LDL Calculated (MG/DL)   Date Value   02/12/2014 106         Lab Results   Component Value Date/Time    WBC 10.9 06/17/2022 05:25 PM    NEUTROABS 7.4 04/25/2020 03:05 AM    HGB 11.0 06/17/2022 05:25 PM    HCT 35.7 06/17/2022 05:25 PM    MCV 81.9 06/17/2022 05:25 PM     06/17/2022 05:25 PM       Lab Results   Component Value Date    TSH 0.56 06/17/2022         ASSESSMENT/PLAN:   1. Fibromyalgia  Continue Cymbalta 60 mg    2. Crohn's disease of colon with other complication (HCC)  Stelara 90 mg/mL  3.  Neuropathic pain  Increased dose will monitor drug screen.  - gabapentin (NEURONTIN) 600 MG tablet; Take 1 tablet by mouth 2 times daily for 30 days. Dispense: 60 tablet;  Refill: 2     Written by Raven NEW, acting as a scribe for Heron Im on 11/28/2022 at 6:36 PM.

## 2022-11-29 RX ORDER — GABAPENTIN 300 MG/1
CAPSULE ORAL
Qty: 90 CAPSULE | Refills: 1 | OUTPATIENT
Start: 2022-11-29 | End: 2022-12-29

## 2023-01-20 DIAGNOSIS — M79.2 NEUROPATHIC PAIN: ICD-10-CM

## 2023-01-20 DIAGNOSIS — M79.7 FIBROMYALGIA: ICD-10-CM

## 2023-01-20 RX ORDER — DULOXETIN HYDROCHLORIDE 60 MG/1
CAPSULE, DELAYED RELEASE ORAL
Qty: 30 CAPSULE | Refills: 3 | Status: SHIPPED | OUTPATIENT
Start: 2023-01-20

## 2023-01-20 RX ORDER — GABAPENTIN 600 MG/1
600 TABLET ORAL 2 TIMES DAILY
Qty: 60 TABLET | Refills: 2 | OUTPATIENT
Start: 2023-01-20 | End: 2023-02-19

## 2023-02-20 DIAGNOSIS — M79.2 NEUROPATHIC PAIN: ICD-10-CM

## 2023-02-22 RX ORDER — GABAPENTIN 600 MG/1
600 TABLET ORAL 2 TIMES DAILY
Qty: 60 TABLET | Refills: 2 | Status: SHIPPED | OUTPATIENT
Start: 2023-02-22 | End: 2023-02-24 | Stop reason: SDUPTHER

## 2023-02-24 ENCOUNTER — OFFICE VISIT (OUTPATIENT)
Dept: PRIMARY CARE CLINIC | Age: 33
End: 2023-02-24

## 2023-02-24 VITALS
HEART RATE: 88 BPM | HEIGHT: 60 IN | RESPIRATION RATE: 16 BRPM | TEMPERATURE: 98.7 F | BODY MASS INDEX: 23.16 KG/M2 | OXYGEN SATURATION: 98 % | DIASTOLIC BLOOD PRESSURE: 82 MMHG | SYSTOLIC BLOOD PRESSURE: 110 MMHG | WEIGHT: 118 LBS

## 2023-02-24 DIAGNOSIS — E55.9 VITAMIN D DEFICIENCY: ICD-10-CM

## 2023-02-24 DIAGNOSIS — M79.2 NEUROPATHIC PAIN: ICD-10-CM

## 2023-02-24 DIAGNOSIS — K50.118 CROHN'S DISEASE OF COLON WITH OTHER COMPLICATION (HCC): Primary | ICD-10-CM

## 2023-02-24 RX ORDER — GABAPENTIN 600 MG/1
600 TABLET ORAL 3 TIMES DAILY
Qty: 90 TABLET | Refills: 2 | Status: SHIPPED | OUTPATIENT
Start: 2023-02-24 | End: 2023-03-26

## 2023-02-24 RX ORDER — ACETAMINOPHEN AND CODEINE PHOSPHATE 120; 12 MG/5ML; MG/5ML
SOLUTION ORAL
COMMUNITY
Start: 2023-02-17

## 2023-02-24 RX ORDER — BUPROPION HYDROCHLORIDE 150 MG/1
150 TABLET, EXTENDED RELEASE ORAL 2 TIMES DAILY
COMMUNITY
Start: 2023-01-31 | End: 2023-03-02

## 2023-02-24 RX ORDER — FOLIC ACID 1 MG/1
TABLET ORAL
COMMUNITY
Start: 2023-02-17

## 2023-02-24 SDOH — ECONOMIC STABILITY: FOOD INSECURITY: WITHIN THE PAST 12 MONTHS, YOU WORRIED THAT YOUR FOOD WOULD RUN OUT BEFORE YOU GOT MONEY TO BUY MORE.: NEVER TRUE

## 2023-02-24 SDOH — ECONOMIC STABILITY: INCOME INSECURITY: HOW HARD IS IT FOR YOU TO PAY FOR THE VERY BASICS LIKE FOOD, HOUSING, MEDICAL CARE, AND HEATING?: NOT HARD AT ALL

## 2023-02-24 SDOH — ECONOMIC STABILITY: FOOD INSECURITY: WITHIN THE PAST 12 MONTHS, THE FOOD YOU BOUGHT JUST DIDN'T LAST AND YOU DIDN'T HAVE MONEY TO GET MORE.: NEVER TRUE

## 2023-02-24 SDOH — ECONOMIC STABILITY: HOUSING INSECURITY
IN THE LAST 12 MONTHS, WAS THERE A TIME WHEN YOU DID NOT HAVE A STEADY PLACE TO SLEEP OR SLEPT IN A SHELTER (INCLUDING NOW)?: NO

## 2023-02-24 ASSESSMENT — PATIENT HEALTH QUESTIONNAIRE - PHQ9
1. LITTLE INTEREST OR PLEASURE IN DOING THINGS: 0
SUM OF ALL RESPONSES TO PHQ9 QUESTIONS 1 & 2: 0
SUM OF ALL RESPONSES TO PHQ QUESTIONS 1-9: 0
SUM OF ALL RESPONSES TO PHQ QUESTIONS 1-9: 0
2. FEELING DOWN, DEPRESSED OR HOPELESS: 0
SUM OF ALL RESPONSES TO PHQ QUESTIONS 1-9: 0
SUM OF ALL RESPONSES TO PHQ QUESTIONS 1-9: 0

## 2023-02-24 NOTE — PROGRESS NOTES
Chief Complaint   Patient presents with    Chronic Pain     Fibromyalgia      Crohn's Disease       Have you seen any other physician or provider since your last visit yes - [facility] for [facility]'s    Have you had any other diagnostic tests since your last visit? yes - labs at [facility]    Have you changed or stopped any medications since your last visit? yes - started Stelaro      I have recommended that this patient have a immunization for pneumonia but she due to refusal reason: not comfortable with test   I have discussed the risks and benefits of this examination with her. The patient verbalizes understanding. Provider will be informed of refusal.      Diabetic retinal exam completed this year? No                       * If yes please have patient sign a records release to obtain record to update Health Maintenance                       * If no, please order referral for patient to be scheduled   SUBJECTIVE:    Patient Kymberly Magallanes is a 29 y.o. female. Chief Complaint   Patient presents with    Chronic Pain     Fibromyalgia      Crohn's Disease     HPI:  Patient is here to follow up on Fibromyalgia and Crohn's. She was seen by gastroenterology at [facility] and started On Stelaro injections. Patient is asking for work excuse for today. She is working and out of al legal issues. She is doing better with the new Crohn's medication. She is working on housing. Patient's medications, allergies, past medical, surgical, social and family histories were reviewed and updated as appropriate. Review of Systems   Constitutional: Negative. HENT: Negative. Eyes: Negative. Respiratory: Negative. Cardiovascular: Negative. Gastrointestinal: Negative. Endocrine: Negative. Genitourinary: Negative. Musculoskeletal:  Positive for arthralgias. Skin: Negative. Allergic/Immunologic: Negative. Neurological: Negative. Hematological: Negative.     Psychiatric/Behavioral: Negative. OBJECTIVE:  /82 (Site: Left Upper Arm, Position: Sitting, Cuff Size: Medium Adult)   Pulse 88   Temp 98.7 °F (37.1 °C) (Temporal)   Resp 16   Ht 5' (1.524 m)   Wt 118 lb (53.5 kg)   SpO2 98% Comment: room air  BMI 23.05 kg/m²    Physical Exam  Vitals and nursing note reviewed. Constitutional:       Appearance: She is well-developed. HENT:      Head: Normocephalic and atraumatic. Eyes:      Conjunctiva/sclera: Conjunctivae normal.      Pupils: Pupils are equal, round, and reactive to light. Neck:      Thyroid: No thyromegaly. Vascular: No JVD. Cardiovascular:      Rate and Rhythm: Normal rate and regular rhythm. Heart sounds: No murmur heard. No friction rub. No gallop. Pulmonary:      Effort: Pulmonary effort is normal. No respiratory distress. Breath sounds: Normal breath sounds. No wheezing or rales. Abdominal:      General: Bowel sounds are normal. There is no distension. Palpations: Abdomen is soft. Tenderness: There is no abdominal tenderness. There is no guarding. Musculoskeletal:         General: No tenderness. Cervical back: Normal range of motion and neck supple. Skin:     General: Skin is warm and dry. Findings: No rash. Neurological:      Mental Status: She is alert and oriented to person, place, and time. Psychiatric:         Judgment: Judgment normal.       No results found for requested labs within last 30 days. Microscopic Examination (no units)   Date Value   01/28/2015 Not Indicated     LDL Calculated (MG/DL)   Date Value   02/12/2014 106         Lab Results   Component Value Date/Time    WBC 10.9 06/17/2022 05:25 PM    NEUTROABS 7.4 04/25/2020 03:05 AM    HGB 11.0 06/17/2022 05:25 PM    HCT 35.7 06/17/2022 05:25 PM    MCV 81.9 06/17/2022 05:25 PM     06/17/2022 05:25 PM       Lab Results   Component Value Date    TSH 0.56 06/17/2022         ASSESSMENT/PLAN:     1.  Crohn's disease of colon with other complication ([address]  Followed with the specialist.  On Leeanna Rios. Continue Cu,swapna 60 mg.     2. Vitamin D deficiency  Continue replacement. 3. Neuropathic pain  Increase to tid dosage. Uds today.   - gabapentin (NEURONTIN) 600 MG tablet; Take 1 tablet by mouth 3 times daily for 30 days. Max Daily Amount: 1,800 mg  Dispense: 90 tablet;  Refill: 2     Written by Tha Biswas, acting as a scribe for Edgardo Khan on 2/24/2023 at 10:27 PM.

## 2023-02-24 NOTE — PATIENT INSTRUCTIONS
Keep a list of your medicines with you. List all of the prescription medicines, nonprescription medicines, supplements, natural remedies, and vitamins that you take. Tell your healthcare providers who treat you about all of the products you are taking. Your provider can provide you with a form to keep track of them. Just ask. Follow the directions that come with your medicine, including information about food or alcohol. Make sure you know how and when to take your medicine. Do not take more or less than you are supposed to take. Keep all medicines out of the reach of children. Store medicines according to the directions on the label. Monitor yourself. Learn to know how your body reacts to your new medicine and keep track of how it makes you feel before attempting (If your provider has allowed you to do so) to drive or go to work. Seek emergency medical attention if you think you have used too much of this medicine. An overdose of any prescription medicine can be fatal. Overdose symptoms may include extreme drowsiness, muscle weakness, confusion, cold and clammy skin, pinpoint pupils, shallow breathing, slow heart rate, fainting, or coma. Don't share prescription medicines with others, even when they seem to have the same symptoms. What may be good for you may be harmful to others. If you are no longer taking a prescribed medication and you have pills left please take your pills out of their original containers. Mix crushed pills with an undesirable substance, such as cat litter or used coffee grounds. Put the mixture into a disposable container with a lid, such as an empty margarine tub, or into a sealable bag. Cover up or remove any of your personal information on the empty containers by covering it with black permanent marker or duct tape. Place the sealed container with the mixture, and the empty drug containers, in the trash.    If you use a medication that is in the form of a patch, dispose of used patches by folding them in half so that the sticky sides meet, and then flushing them down a toilet. They should not be placed in the household trash where children or pets can find them. If you have any questions, ask your provider or pharmacist for more information. Be sure to keep all appointments for provider visits or tests. We are committed to providing you with the best care possible. In order to help us achieve these goals please remember to bring all medications, herbal products, and over the counter supplements with you to each visit. If your provider has ordered testing for you, please be sure to follow up with our office if you have not received results within 7 days after the testing took place. *If you receive a survey after visiting one of our offices, please take time to share your experience concerning your physician office visit. These surveys are confidential and no health information about you is shared. We are eager to improve for you and we are counting on your feedback to help make that happen. ips to Help You Stop Smoking       Cigarette smoking is a preventable cause of death in the [place]. If you have thought about quitting but haven't been able to, here are some reasons why you should and some ways to do it. Here's Why   Quitting smoking now can decrease your risk of getting smoking-related illnesses like:   Heart disease   Stroke   Several types of cancer, including:   Lung   Mouth   Esophagus   Larynx   Bladder   Pancreas   Kidney   Chronic lung diseases:   Bronchitis   Emphysema   Asthma   Cataracts   Macular degeneration   Thyroid conditions   Hearing loss   Erectile dysfunction   Dementia   Osteoporosis   Here's How   Once you've decided to quit smoking, set your target quit date a few weeks away.  In the time leading up to your quit day, try some of these ideas offered by the [address] [place] [address] of the [employer] to help you successfully quit smoking. For the best results, work with your doctor. Together, you can test your lung function and compare the results to those of a nonsmoking person. The results can be given to you as your lung age. Finding out your lung age right after having the test done may help you to stop smoking. Your doctor can also discuss with you all of your options and refer you to smoking-cessation support groups. You may wish to use nicotine replacement (gum, patches, inhaler) or one of the prescription medications that have been shown to increase quit rates and prolong abstinence from smoking. But whatever you and your doctor decide on these matters, it will still be you who decides when an how to quit. Here are some techniques:   Switch Brands   Switch to a brand you find distasteful. Change to a brand that is low in tar and nicotine a couple of weeks before your target quit date. This will help change your smoking behavior. However, do not smoke more cigarettes, inhale them more often or more deeply, or place your fingertips over the holes in the filters. All of these actions will increase your nicotine intake, and the idea is to get your body used to functioning without nicotine. Cut Down the Number of Cigarettes You Smoke   Smoke only half of each cigarette. Each day, postpone the lighting of your first cigarette by one hour. Decide you'll only smoke during odd or even hours of the day. Decide beforehand how many cigarettes you'll smoke during the day. For each additional cigarette, give a dollar to your favorite bart. Change your eating habits to help you cut down. For example, drink milk, which many people consider incompatible with smoking. End meals or snacks with something that won't lead to a cigarette. Reach for a glass of juice instead of a cigarette for a \"pick-me-up. \"   Remember: Cutting down can help you quit, but it's not a substitute for quitting.  If you're down to about seven cigarettes a day, it's time to set your target quit date, and get ready to stick to it. Don't Smoke \"Automatically\"   Smoke only those cigarettes you really want. Catch yourself before you light up a cigarette out of pure habit. Don't empty your ashtrays. This will remind you of how many cigarettes you've smoked each day, and the sight and the smell of stale cigarettes butts will be very unpleasant. Make yourself aware of each cigarette by using the opposite hand or putting cigarettes in an unfamiliar location or a different pocket to break the automatic reach. If you light up many times during the day without even thinking about it, try to look in a mirror each time you put a match to your cigarette. You may decide you don't need it. Make Smoking Inconvenient   Stop buying cigarettes by the carton. Wait until one pack is empty before you buy another. Stop carrying cigarettes with you at home or at work. Make them difficult to get to. Make Smoking Unpleasant   Smoke only under circumstances that aren't especially pleasurable for you. If you like to smoke with others, smoke alone. Turn your chair to an empty corner and focus only on the cigarette you are smoking and all its many negative effects. Collect all your cigarette butts in one large glass container as a visual reminder of the filth made by smoking. Just Before Quitting   Practice going without cigarettes. Don't think of never smoking again. Think of quitting in terms of one day at a time . Tell yourself you won't smoke today, and then don't. Clean your clothes to rid them of the cigarette smell, which can linger a long time. On the Day You Quit   Throw away all your cigarettes and matches. Hide your lighters and ashtrays. Visit the dentist and have your teeth cleaned to get rid of tobacco stains. Notice how nice they look and resolve to keep them that way.    Make a list of things you'd like to buy for yourself or someone else. Estimate the cost in terms of packs of cigarettes, and put the money aside to buy these presents. Keep very busy on the big day. Go to the movies, exercise, take long walks, or go bike riding. Remind your family and friends that this is your quit date, and ask them to help you over the rough spots of the first couple of days and weeks. Buy yourself a treat or do something special to celebrate. Telephone and Internet Support   Telephone, web-, and computer-based programs can offer you the support that you need to quit and to stay smoke-free. You can find many programs online, like the [employer]'s [place] from Smoking . Immediately After Quitting   Develop a clean, fresh, nonsmoking environment around yourselfat work and at home. Buy yourself flowersyou may be surprised how much you can enjoy their scent now. The first few days after you quit, spend as much free time as possible in places where smoking isn't allowed, such as [address] [place] [address], museums, theaters, department stores, and churches. Drink large quantities of water and fruit juice (but avoid sodas that contain caffeine). Try to avoid alcohol, coffee, and other beverages that you associate with cigarette smoking. Strike up conversation instead of a match for a cigarette. If you miss the sensation of having a cigarette in your hand, play with something elsea pencil, a paper clip, a marble. If you miss having something in your mouth, try toothpicks or a fake cigarette.

## 2023-04-19 ENCOUNTER — HOSPITAL ENCOUNTER (OUTPATIENT)
Facility: HOSPITAL | Age: 33
Discharge: HOME | End: 2023-04-19
Payer: COMMERCIAL

## 2023-04-19 LAB
ALBUMIN SERPL-MCNC: 4.7 G/DL (ref 3.4–4.8)
ALBUMIN/GLOB SERPL: 1.4 {RATIO} (ref 0.8–2)
ALP SERPL-CCNC: 60 U/L (ref 25–100)
ALT SERPL-CCNC: 15 U/L (ref 4–36)
ANION GAP SERPL CALCULATED.3IONS-SCNC: 13 MMOL/L (ref 3–16)
AST SERPL-CCNC: 15 U/L (ref 8–33)
BASOPHILS # BLD: 0.1 K/UL (ref 0–0.1)
BASOPHILS NFR BLD: 0.7 %
BILIRUB SERPL-MCNC: <0.2 MG/DL (ref 0.3–1.2)
BUN SERPL-MCNC: 6 MG/DL (ref 6–20)
CALCIUM SERPL-MCNC: 9.6 MG/DL (ref 8.5–10.5)
CHLORIDE SERPL-SCNC: 101 MMOL/L (ref 98–107)
CO2 SERPL-SCNC: 24 MMOL/L (ref 20–30)
CREAT SERPL-MCNC: 0.6 MG/DL (ref 0.4–1.2)
CRP SERPL-MCNC: <3 MG/L (ref 0–5.1)
EOSINOPHIL # BLD: 0.3 K/UL (ref 0–0.4)
EOSINOPHIL NFR BLD: 3.2 %
ERYTHROCYTE [DISTWIDTH] IN BLOOD BY AUTOMATED COUNT: 15.3 % (ref 11–16)
GFR SERPLBLD CREATININE-BSD FMLA CKD-EPI: >60 ML/MIN/{1.73_M2}
GLOBULIN SER CALC-MCNC: 3.3 G/DL
GLUCOSE SERPL-MCNC: 58 MG/DL (ref 74–106)
HCT VFR BLD AUTO: 38.6 % (ref 37–47)
HGB BLD-MCNC: 12.3 G/DL (ref 11.5–16.5)
IMM GRANULOCYTES # BLD: 0 K/UL
IMM GRANULOCYTES NFR BLD: 0.2 % (ref 0–5)
LYMPHOCYTES # BLD: 3.3 K/UL (ref 1.5–4)
LYMPHOCYTES NFR BLD: 36.7 %
MCH RBC QN AUTO: 26.7 PG (ref 27–32)
MCHC RBC AUTO-ENTMCNC: 31.9 G/DL (ref 31–35)
MCV RBC AUTO: 83.9 FL (ref 80–100)
MONOCYTES # BLD: 0.6 K/UL (ref 0.2–0.8)
MONOCYTES NFR BLD: 6.6 %
NEUTROPHILS # BLD: 4.7 K/UL (ref 2–7.5)
NEUTS SEG NFR BLD: 52.6 %
PLATELET # BLD AUTO: 287 K/UL (ref 150–400)
PMV BLD AUTO: 9.5 FL (ref 6–10)
POTASSIUM SERPL-SCNC: 3.5 MMOL/L (ref 3.4–5.1)
PROT SERPL-MCNC: 8 G/DL (ref 6.4–8.3)
RBC # BLD AUTO: 4.6 M/UL (ref 3.8–5.8)
SODIUM SERPL-SCNC: 138 MMOL/L (ref 136–145)
WBC # BLD AUTO: 9 K/UL (ref 4–11)

## 2023-04-19 PROCEDURE — 80053 COMPREHEN METABOLIC PANEL: CPT

## 2023-04-19 PROCEDURE — 85025 COMPLETE CBC W/AUTO DIFF WBC: CPT

## 2023-04-19 PROCEDURE — 36415 COLL VENOUS BLD VENIPUNCTURE: CPT

## 2023-04-19 PROCEDURE — 86140 C-REACTIVE PROTEIN: CPT

## 2023-04-21 DIAGNOSIS — M79.2 NEUROPATHIC PAIN: ICD-10-CM

## 2023-04-21 RX ORDER — GABAPENTIN 600 MG/1
600 TABLET ORAL 3 TIMES DAILY
Qty: 90 TABLET | Refills: 2 | OUTPATIENT
Start: 2023-04-21 | End: 2023-05-21

## 2023-05-09 ENCOUNTER — TELEPHONE (OUTPATIENT)
Dept: PRIMARY CARE CLINIC | Age: 33
End: 2023-05-09

## 2023-05-17 ENCOUNTER — TELEPHONE (OUTPATIENT)
Dept: PRIMARY CARE CLINIC | Age: 33
End: 2023-05-17

## 2023-05-17 DIAGNOSIS — M79.7 FIBROMYALGIA: ICD-10-CM

## 2023-05-18 RX ORDER — DULOXETIN HYDROCHLORIDE 60 MG/1
CAPSULE, DELAYED RELEASE ORAL
Qty: 30 CAPSULE | Refills: 3 | Status: SHIPPED | OUTPATIENT
Start: 2023-05-18

## 2023-05-26 ENCOUNTER — OFFICE VISIT (OUTPATIENT)
Dept: PRIMARY CARE CLINIC | Age: 33
End: 2023-05-26
Payer: COMMERCIAL

## 2023-05-26 VITALS
BODY MASS INDEX: 23.4 KG/M2 | HEART RATE: 88 BPM | HEIGHT: 60 IN | RESPIRATION RATE: 16 BRPM | WEIGHT: 119.2 LBS | SYSTOLIC BLOOD PRESSURE: 121 MMHG | OXYGEN SATURATION: 99 % | DIASTOLIC BLOOD PRESSURE: 80 MMHG | TEMPERATURE: 98.1 F

## 2023-05-26 DIAGNOSIS — M79.7 FIBROMYALGIA: ICD-10-CM

## 2023-05-26 DIAGNOSIS — M79.605 PAIN IN BOTH LOWER EXTREMITIES: ICD-10-CM

## 2023-05-26 DIAGNOSIS — K50.118 CROHN'S DISEASE OF COLON WITH OTHER COMPLICATION (HCC): ICD-10-CM

## 2023-05-26 DIAGNOSIS — M79.2 NEUROPATHIC PAIN: Primary | ICD-10-CM

## 2023-05-26 DIAGNOSIS — M79.604 PAIN IN BOTH LOWER EXTREMITIES: ICD-10-CM

## 2023-05-26 PROCEDURE — G8427 DOCREV CUR MEDS BY ELIG CLIN: HCPCS | Performed by: NURSE PRACTITIONER

## 2023-05-26 PROCEDURE — G8420 CALC BMI NORM PARAMETERS: HCPCS | Performed by: NURSE PRACTITIONER

## 2023-05-26 PROCEDURE — 4004F PT TOBACCO SCREEN RCVD TLK: CPT | Performed by: NURSE PRACTITIONER

## 2023-05-26 PROCEDURE — 99214 OFFICE O/P EST MOD 30 MIN: CPT | Performed by: NURSE PRACTITIONER

## 2023-05-26 RX ORDER — LOPERAMIDE HYDROCHLORIDE 2 MG/1
CAPSULE ORAL
COMMUNITY
Start: 2023-05-16

## 2023-05-26 RX ORDER — CYPROHEPTADINE HYDROCHLORIDE 4 MG/1
4 TABLET ORAL 2 TIMES DAILY
COMMUNITY
Start: 2023-05-16

## 2023-05-26 RX ORDER — GABAPENTIN 800 MG/1
800 TABLET ORAL 3 TIMES DAILY
Qty: 90 TABLET | Refills: 2 | Status: SHIPPED | OUTPATIENT
Start: 2023-05-26 | End: 2023-06-25

## 2023-05-26 ASSESSMENT — ENCOUNTER SYMPTOMS
EYES NEGATIVE: 1
GASTROINTESTINAL NEGATIVE: 1
RESPIRATORY NEGATIVE: 1
ALLERGIC/IMMUNOLOGIC NEGATIVE: 1

## 2023-05-26 NOTE — PROGRESS NOTES
Conjunctiva/sclera: Conjunctivae normal.      Pupils: Pupils are equal, round, and reactive to light. Neck:      Thyroid: No thyromegaly. Vascular: No JVD. Cardiovascular:      Rate and Rhythm: Normal rate and regular rhythm. Heart sounds: No murmur heard. No friction rub. No gallop. Pulmonary:      Effort: Pulmonary effort is normal. No respiratory distress. Breath sounds: Normal breath sounds. No wheezing or rales. Abdominal:      General: Bowel sounds are normal. There is no distension. Palpations: Abdomen is soft. Tenderness: There is no abdominal tenderness. There is no guarding. Musculoskeletal:         General: No tenderness. Cervical back: Normal range of motion and neck supple. Skin:     General: Skin is warm and dry. Findings: Erythema present. No rash. Neurological:      Mental Status: She is alert and oriented to person, place, and time. Psychiatric:         Judgment: Judgment normal.       No results found for requested labs within last 30 days. Microscopic Examination (no units)   Date Value   01/28/2015 Not Indicated     LDL Calculated (MG/DL)   Date Value   02/12/2014 106         Lab Results   Component Value Date/Time    WBC 9.0 04/19/2023 06:45 PM    NEUTROABS 4.7 04/19/2023 06:45 PM    HGB 12.3 04/19/2023 06:45 PM    HCT 38.6 04/19/2023 06:45 PM    MCV 83.9 04/19/2023 06:45 PM     04/19/2023 06:45 PM       Lab Results   Component Value Date    TSH 0.56 06/17/2022         ASSESSMENT/PLAN:     1. Neuropathic pain    - gabapentin (NEURONTIN) 800 MG tablet; Take 1 tablet by mouth 3 times daily for 30 days. Max Daily Amount: 2,400 mg  Dispense: 90 tablet; Refill: 2    2. Fibromyalgia    - gabapentin (NEURONTIN) 800 MG tablet; Take 1 tablet by mouth 3 times daily for 30 days. Max Daily Amount: 2,400 mg  Dispense: 90 tablet; Refill: 2    3.  [facility] other [facility]    - gabapentin (NEURONTIN) 800 MG

## 2023-07-25 ENCOUNTER — TELEPHONE (OUTPATIENT)
Dept: PSYCHIATRY | Facility: CLINIC | Age: 33
End: 2023-07-25
Payer: COMMERCIAL

## 2023-07-28 ENCOUNTER — HOSPITAL ENCOUNTER (OUTPATIENT)
Dept: INFUSION THERAPY | Facility: HOSPITAL | Age: 33
Discharge: HOME | End: 2023-07-28
Payer: COMMERCIAL

## 2023-07-28 VITALS
SYSTOLIC BLOOD PRESSURE: 106 MMHG | OXYGEN SATURATION: 98 % | DIASTOLIC BLOOD PRESSURE: 61 MMHG | HEART RATE: 90 BPM | TEMPERATURE: 98.8 F | WEIGHT: 143 LBS | HEIGHT: 60 IN | BODY MASS INDEX: 28.07 KG/M2

## 2023-07-28 DIAGNOSIS — K50.90 CROHN'S DISEASE WITHOUT COMPLICATION, UNSPECIFIED GASTROINTESTINAL TRACT LOCATION: Primary | ICD-10-CM

## 2023-07-28 LAB
ALBUMIN SERPL-MCNC: 3.9 G/DL (ref 3.5–5.2)
ALBUMIN/GLOB SERPL: 1.3 G/DL
ALP SERPL-CCNC: 67 U/L (ref 39–117)
ALT SERPL W P-5'-P-CCNC: 16 U/L (ref 1–33)
ANION GAP SERPL CALCULATED.3IONS-SCNC: 14.5 MMOL/L (ref 5–15)
AST SERPL-CCNC: 18 U/L (ref 1–32)
BASOPHILS # BLD AUTO: 0.05 10*3/MM3 (ref 0–0.2)
BASOPHILS NFR BLD AUTO: 0.6 % (ref 0–1.5)
BILIRUB SERPL-MCNC: <0.2 MG/DL (ref 0–1.2)
BUN SERPL-MCNC: 8 MG/DL (ref 6–20)
BUN/CREAT SERPL: 16 (ref 7–25)
CALCIUM SPEC-SCNC: 8.5 MG/DL (ref 8.6–10.5)
CHLORIDE SERPL-SCNC: 104 MMOL/L (ref 98–107)
CO2 SERPL-SCNC: 20.5 MMOL/L (ref 22–29)
CREAT SERPL-MCNC: 0.5 MG/DL (ref 0.57–1)
CRP SERPL-MCNC: <0.3 MG/DL (ref 0–0.5)
DEPRECATED RDW RBC AUTO: 40.1 FL (ref 37–54)
EGFRCR SERPLBLD CKD-EPI 2021: 128.8 ML/MIN/1.73
EOSINOPHIL # BLD AUTO: 0.4 10*3/MM3 (ref 0–0.4)
EOSINOPHIL NFR BLD AUTO: 4.5 % (ref 0.3–6.2)
ERYTHROCYTE [DISTWIDTH] IN BLOOD BY AUTOMATED COUNT: 13.1 % (ref 12.3–15.4)
GLOBULIN UR ELPH-MCNC: 3 GM/DL
GLUCOSE SERPL-MCNC: 96 MG/DL (ref 65–99)
HCT VFR BLD AUTO: 32.7 % (ref 34–46.6)
HGB BLD-MCNC: 10.4 G/DL (ref 12–15.9)
IMM GRANULOCYTES # BLD AUTO: 0.04 10*3/MM3 (ref 0–0.05)
IMM GRANULOCYTES NFR BLD AUTO: 0.5 % (ref 0–0.5)
LYMPHOCYTES # BLD AUTO: 2.19 10*3/MM3 (ref 0.7–3.1)
LYMPHOCYTES NFR BLD AUTO: 24.8 % (ref 19.6–45.3)
MCH RBC QN AUTO: 26.8 PG (ref 26.6–33)
MCHC RBC AUTO-ENTMCNC: 31.8 G/DL (ref 31.5–35.7)
MCV RBC AUTO: 84.3 FL (ref 79–97)
MONOCYTES # BLD AUTO: 0.75 10*3/MM3 (ref 0.1–0.9)
MONOCYTES NFR BLD AUTO: 8.5 % (ref 5–12)
NEUTROPHILS NFR BLD AUTO: 5.41 10*3/MM3 (ref 1.7–7)
NEUTROPHILS NFR BLD AUTO: 61.1 % (ref 42.7–76)
NRBC BLD AUTO-RTO: 0 /100 WBC (ref 0–0.2)
PLATELET # BLD AUTO: 297 10*3/MM3 (ref 140–450)
PMV BLD AUTO: 8.6 FL (ref 6–12)
POTASSIUM SERPL-SCNC: 3.8 MMOL/L (ref 3.5–5.2)
PROT SERPL-MCNC: 6.9 G/DL (ref 6–8.5)
RBC # BLD AUTO: 3.88 10*6/MM3 (ref 3.77–5.28)
SODIUM SERPL-SCNC: 139 MMOL/L (ref 136–145)
WBC NRBC COR # BLD: 8.84 10*3/MM3 (ref 3.4–10.8)

## 2023-07-28 PROCEDURE — 85025 COMPLETE CBC W/AUTO DIFF WBC: CPT | Performed by: INTERNAL MEDICINE

## 2023-07-28 PROCEDURE — 86140 C-REACTIVE PROTEIN: CPT | Performed by: INTERNAL MEDICINE

## 2023-07-28 PROCEDURE — 96365 THER/PROPH/DIAG IV INF INIT: CPT

## 2023-07-28 PROCEDURE — 96366 THER/PROPH/DIAG IV INF ADDON: CPT

## 2023-07-28 PROCEDURE — 80053 COMPREHEN METABOLIC PANEL: CPT | Performed by: INTERNAL MEDICINE

## 2023-07-28 PROCEDURE — 25010000002 RISANKIZUMAB-RZAA 600 MG/10ML SOLUTION 10 ML VIAL: Performed by: INTERNAL MEDICINE

## 2023-07-28 RX ORDER — DIPHENHYDRAMINE HYDROCHLORIDE 50 MG/ML
50 INJECTION INTRAMUSCULAR; INTRAVENOUS ONCE AS NEEDED
Status: DISCONTINUED | OUTPATIENT
Start: 2023-07-28 | End: 2023-07-30 | Stop reason: HOSPADM

## 2023-07-28 RX ORDER — EPINEPHRINE IN SOD CHLOR,ISO 1 MG/10 ML
0.1 SYRINGE (ML) INTRAVENOUS
Start: 2023-08-25

## 2023-07-28 RX ORDER — DEXTROSE MONOHYDRATE 50 MG/ML
20 INJECTION, SOLUTION INTRAVENOUS AS NEEDED
Start: 2023-08-25

## 2023-07-28 RX ORDER — DEXTROSE MONOHYDRATE 50 MG/ML
20 INJECTION, SOLUTION INTRAVENOUS AS NEEDED
Status: DISCONTINUED | OUTPATIENT
Start: 2023-07-28 | End: 2023-07-30 | Stop reason: HOSPADM

## 2023-07-28 RX ORDER — METHYLPREDNISOLONE SODIUM SUCCINATE 40 MG/ML
40 INJECTION, POWDER, LYOPHILIZED, FOR SOLUTION INTRAMUSCULAR; INTRAVENOUS ONCE AS NEEDED
Start: 2023-08-25

## 2023-07-28 RX ORDER — EPINEPHRINE IN SOD CHLOR,ISO 1 MG/10 ML
0.1 SYRINGE (ML) INTRAVENOUS
Status: DISCONTINUED | OUTPATIENT
Start: 2023-07-28 | End: 2023-07-30 | Stop reason: HOSPADM

## 2023-07-28 RX ORDER — METHYLPREDNISOLONE SODIUM SUCCINATE 40 MG/ML
40 INJECTION, POWDER, LYOPHILIZED, FOR SOLUTION INTRAMUSCULAR; INTRAVENOUS ONCE AS NEEDED
Status: DISCONTINUED | OUTPATIENT
Start: 2023-07-28 | End: 2023-07-30 | Stop reason: HOSPADM

## 2023-07-28 RX ORDER — EPINEPHRINE IN SOD CHLOR,ISO 1 MG/10 ML
0.3 SYRINGE (ML) INTRAVENOUS
Status: DISCONTINUED | OUTPATIENT
Start: 2023-07-28 | End: 2023-07-30 | Stop reason: HOSPADM

## 2023-07-28 RX ORDER — FAMOTIDINE 10 MG/ML
20 INJECTION, SOLUTION INTRAVENOUS ONCE AS NEEDED
Status: DISCONTINUED | OUTPATIENT
Start: 2023-07-28 | End: 2023-07-30 | Stop reason: HOSPADM

## 2023-07-28 RX ORDER — DIPHENHYDRAMINE HYDROCHLORIDE 50 MG/ML
50 INJECTION INTRAMUSCULAR; INTRAVENOUS ONCE AS NEEDED
OUTPATIENT
Start: 2023-08-25

## 2023-07-28 RX ORDER — FAMOTIDINE 10 MG/ML
20 INJECTION, SOLUTION INTRAVENOUS ONCE AS NEEDED
Start: 2023-08-25

## 2023-07-28 RX ORDER — ALBUTEROL SULFATE 90 UG/1
2 AEROSOL, METERED RESPIRATORY (INHALATION)
Start: 2023-08-25

## 2023-07-28 RX ORDER — EPINEPHRINE IN SOD CHLOR,ISO 1 MG/10 ML
0.3 SYRINGE (ML) INTRAVENOUS
Start: 2023-08-25

## 2023-07-28 RX ORDER — ALBUTEROL SULFATE 90 UG/1
2 AEROSOL, METERED RESPIRATORY (INHALATION)
Status: DISCONTINUED | OUTPATIENT
Start: 2023-07-28 | End: 2023-07-30 | Stop reason: HOSPADM

## 2023-07-28 RX ADMIN — DEXTROSE MONOHYDRATE 600 MG: 50 INJECTION, SOLUTION INTRAVENOUS at 15:56

## 2023-08-03 PROBLEM — K50.90 CROHN'S DISEASE WITHOUT COMPLICATION: Status: ACTIVE | Noted: 2022-09-15

## 2023-08-03 PROBLEM — Z60.9 SOCIAL PROBLEM: Status: ACTIVE | Noted: 2023-01-31

## 2023-08-03 PROBLEM — R63.4 WEIGHT LOSS, NON-INTENTIONAL: Status: ACTIVE | Noted: 2023-01-31

## 2023-08-03 PROBLEM — F11.11 HISTORY OF OPIOID ABUSE: Status: ACTIVE | Noted: 2019-10-22

## 2023-08-03 PROBLEM — N63.41 SUBAREOLAR MASS OF RIGHT BREAST: Status: ACTIVE | Noted: 2023-05-16

## 2023-08-03 PROBLEM — Z65.9 SOCIAL PROBLEM: Status: ACTIVE | Noted: 2023-01-31

## 2023-08-03 PROBLEM — Z87.19 HISTORY OF CROHN'S DISEASE: Status: ACTIVE | Noted: 2019-10-22

## 2023-08-03 PROBLEM — L88 PYODERMA GANGRENOSUM: Status: ACTIVE | Noted: 2023-01-31

## 2023-08-03 PROBLEM — Z72.0 TOBACCO ABUSE: Status: ACTIVE | Noted: 2023-01-31

## 2023-08-04 ENCOUNTER — LAB (OUTPATIENT)
Dept: LAB | Facility: HOSPITAL | Age: 33
End: 2023-08-04
Payer: COMMERCIAL

## 2023-08-04 ENCOUNTER — OFFICE VISIT (OUTPATIENT)
Dept: PSYCHIATRY | Facility: CLINIC | Age: 33
End: 2023-08-04
Payer: COMMERCIAL

## 2023-08-04 VITALS
DIASTOLIC BLOOD PRESSURE: 74 MMHG | HEIGHT: 60 IN | BODY MASS INDEX: 28.27 KG/M2 | HEART RATE: 86 BPM | WEIGHT: 144 LBS | SYSTOLIC BLOOD PRESSURE: 112 MMHG

## 2023-08-04 DIAGNOSIS — F15.21 METHAMPHETAMINE USE DISORDER, SEVERE, IN SUSTAINED REMISSION: Primary | ICD-10-CM

## 2023-08-04 DIAGNOSIS — F11.21 OPIOID USE DISORDER, SEVERE, IN SUSTAINED REMISSION: ICD-10-CM

## 2023-08-04 DIAGNOSIS — F12.91 CANNABIS USE, UNSPECIFIED, IN REMISSION: ICD-10-CM

## 2023-08-04 DIAGNOSIS — F15.21 METHAMPHETAMINE USE DISORDER, SEVERE, IN SUSTAINED REMISSION: ICD-10-CM

## 2023-08-04 LAB
AMPHET+METHAMPHET UR QL: NEGATIVE
AMPHETAMINES UR QL: NEGATIVE
BARBITURATES UR QL SCN: NEGATIVE
BENZODIAZ UR QL SCN: NEGATIVE
BUPRENORPHINE SERPL-MCNC: NEGATIVE NG/ML
CANNABINOIDS SERPL QL: NEGATIVE
COCAINE UR QL: NEGATIVE
METHADONE UR QL SCN: NEGATIVE
OPIATES UR QL: NEGATIVE
OXYCODONE UR QL SCN: NEGATIVE
PCP UR QL SCN: NEGATIVE
PROPOXYPH UR QL: NEGATIVE
TRICYCLICS UR QL SCN: NEGATIVE

## 2023-08-04 PROCEDURE — 80306 DRUG TEST PRSMV INSTRMNT: CPT

## 2023-08-04 RX ORDER — LOPERAMIDE HYDROCHLORIDE 2 MG/1
CAPSULE ORAL
COMMUNITY
Start: 2023-05-16

## 2023-08-04 RX ORDER — CYANOCOBALAMIN 1000 UG/ML
1000 INJECTION, SOLUTION INTRAMUSCULAR; SUBCUTANEOUS
COMMUNITY
Start: 2023-07-12

## 2023-08-04 RX ORDER — BUPROPION HYDROCHLORIDE 150 MG/1
1 TABLET ORAL 2 TIMES DAILY
COMMUNITY
Start: 2023-06-27

## 2023-08-04 RX ORDER — TIZANIDINE 4 MG/1
TABLET ORAL
COMMUNITY
Start: 2023-07-28

## 2023-08-04 RX ORDER — DULOXETIN HYDROCHLORIDE 60 MG/1
1 CAPSULE, DELAYED RELEASE ORAL EVERY MORNING
COMMUNITY
Start: 2023-05-18

## 2023-08-04 RX ORDER — GABAPENTIN 800 MG/1
800 TABLET ORAL 3 TIMES DAILY
COMMUNITY
Start: 2023-05-26

## 2023-08-04 RX ORDER — CYPROHEPTADINE HYDROCHLORIDE 4 MG/1
4 TABLET ORAL
COMMUNITY
Start: 2023-05-16 | End: 2024-05-15

## 2023-08-04 RX ORDER — PREDNISONE 10 MG/1
TABLET ORAL
COMMUNITY
Start: 2023-07-28

## 2023-08-04 RX ORDER — FOLIC ACID 1 MG/1
TABLET ORAL
COMMUNITY
Start: 2022-09-15

## 2023-08-08 LAB
1OH-MIDAZOLAM UR CFM-MCNC: NEGATIVE NG/ML
6MAM UR CFM-MCNC: NEGATIVE NG/ML
7AMINOCLONAZEPAM UR CFM-MCNC: NEGATIVE NG/ML
7AMINOCLONAZEPAM UR CFM-MCNC: NEGATIVE NG/ML
A-OH ALPRAZ UR CFM-MCNC: NEGATIVE NG/ML
ALPRAZ UR CFM-MCNC: NEGATIVE NG/ML
AMOBARBITAL UR CFM-MCNC: NEGATIVE NG/ML
AMPHET UR CFM-MCNC: NEGATIVE NG/ML
BUPRENORPHINE UR-MCNC: NEGATIVE NG/ML
BUTABARBITAL UR CFM-MCNC: NEGATIVE NG/ML
BUTALBITAL UR CFM-MCNC: NEGATIVE NG/ML
BZE UR CFM-MCNC: NEGATIVE NG/ML
CARBOXYTHC UR CFM-MCNC: NEGATIVE NG/ML
CARISOPRODOL UR CFM-MCNC: NEGATIVE NG/ML
CODEINE UR CFM-MCNC: NEGATIVE NG/ML
CREATININE: 72.3 MG/DL
DIAZEPAM UR CFM-MCNC: NEGATIVE NG/ML
EDDP UR CFM-MCNC: NEGATIVE NG/ML
ETHYL GLUCURONIDE UR CFM-MCNC: >4000 NG/ML
FENTANYL UR-MCNC: NEGATIVE NG/ML
GABAPENTIN UR CFM-MCNC: ABNORMAL NG/ML
HYDROCODONE UR CFM-MCNC: NEGATIVE NG/ML
HYDROMORPHONE UR CFM-MCNC: NEGATIVE NG/ML
LORAZEPAM UR CFM-MCNC: NEGATIVE NG/ML
MDMA UR CFM-MCNC: NEGATIVE NG/ML
ME-PHENIDATE UR CFM-MCNC: NEGATIVE NG/ML
METHADONE UR CFM-MCNC: NEGATIVE NG/ML
METHAMPHET UR CFM-MCNC: NEGATIVE NG/ML
MIDAZOLAM UR CFM-MCNC: NEGATIVE NG/ML
MORPHINE UR CFM-MCNC: NEGATIVE NG/ML
NORBUPRENORPHINE UR CFM-MCNC: NEGATIVE NG/ML
NORDIAZEPAM UR CFM-MCNC: NEGATIVE NG/ML
NORFENTANYL UR CFM-MCNC: NEGATIVE NG/ML
NORHYDROCODONE UR CFM-MCNC: NEGATIVE NG/ML
NOROXYCODONE UR CFM-MCNC: NEGATIVE NG/ML
OXAZEPAM CTO UR CFM-MCNC: NEGATIVE NG/ML
OXYCODONE SERPL-MCNC: NEGATIVE NG/ML
OXYMORPHONE SERPL-MCNC: NEGATIVE NG/ML
PCP UR CFM-MCNC: NEGATIVE NG/ML
PH: 9 (ref 4.5–9)
PHENOBARB UR CFM-MCNC: NEGATIVE NG/ML
PHENTERMINE: NEGATIVE NG/ML
PPAA UR CFM-MCNC: NEGATIVE NG/ML
PREGABALIN UR CFM-MCNC: NEGATIVE NG/ML
SECOBARBITAL UR CFM-MCNC: NEGATIVE NG/ML
SPECIFIC GRAVITY: 1.01 (ref 1–1.03)
TAPENTADOL UR CFM-MCNC: NEGATIVE NG/ML
TEMAZEPAM UR CFM-MCNC: NEGATIVE NG/ML
TRAMADOL: NEGATIVE NG/ML
ZOLPIDEM PHENYL-4-CARB UR CFM-MCNC: NEGATIVE NG/ML
ZOLPIDEM UR CFM-MCNC: NEGATIVE NG/ML

## 2023-08-28 ENCOUNTER — OFFICE VISIT (OUTPATIENT)
Dept: PSYCHIATRY | Facility: CLINIC | Age: 33
End: 2023-08-28
Payer: COMMERCIAL

## 2023-08-28 DIAGNOSIS — F15.21 METHAMPHETAMINE USE DISORDER, SEVERE, IN SUSTAINED REMISSION: Primary | ICD-10-CM

## 2023-08-28 DIAGNOSIS — F11.11 OPIOID USE DISORDER, MILD, IN EARLY REMISSION: ICD-10-CM

## 2023-08-28 DIAGNOSIS — F12.11 CANNABIS USE DISORDER, MILD, IN EARLY REMISSION: ICD-10-CM

## 2023-08-29 ENCOUNTER — OFFICE VISIT (OUTPATIENT)
Dept: PRIMARY CARE CLINIC | Age: 33
End: 2023-08-29
Payer: COMMERCIAL

## 2023-08-29 VITALS — TEMPERATURE: 98.4 F | HEART RATE: 113 BPM | OXYGEN SATURATION: 99 %

## 2023-08-29 DIAGNOSIS — J02.9 SORE THROAT: ICD-10-CM

## 2023-08-29 DIAGNOSIS — J06.9 ACUTE UPPER RESPIRATORY INFECTION: Primary | ICD-10-CM

## 2023-08-29 LAB
Lab: NORMAL
QC PASS/FAIL: NORMAL
S PYO AG THROAT QL: NORMAL
SARS-COV-2 RDRP RESP QL NAA+PROBE: NEGATIVE

## 2023-08-29 PROCEDURE — 4004F PT TOBACCO SCREEN RCVD TLK: CPT | Performed by: PHYSICIAN ASSISTANT

## 2023-08-29 PROCEDURE — G8427 DOCREV CUR MEDS BY ELIG CLIN: HCPCS | Performed by: PHYSICIAN ASSISTANT

## 2023-08-29 PROCEDURE — G8420 CALC BMI NORM PARAMETERS: HCPCS | Performed by: PHYSICIAN ASSISTANT

## 2023-08-29 PROCEDURE — 99213 OFFICE O/P EST LOW 20 MIN: CPT | Performed by: PHYSICIAN ASSISTANT

## 2023-08-29 ASSESSMENT — ENCOUNTER SYMPTOMS
SORE THROAT: 1
EYE PAIN: 0
SHORTNESS OF BREATH: 0
CONSTIPATION: 0
COUGH: 1
ABDOMINAL PAIN: 0
DIARRHEA: 0

## 2023-08-30 ENCOUNTER — OFFICE VISIT (OUTPATIENT)
Dept: PRIMARY CARE CLINIC | Age: 33
End: 2023-08-30
Payer: COMMERCIAL

## 2023-08-30 VITALS
RESPIRATION RATE: 16 BRPM | HEIGHT: 60 IN | BODY MASS INDEX: 28.62 KG/M2 | DIASTOLIC BLOOD PRESSURE: 73 MMHG | TEMPERATURE: 97.6 F | OXYGEN SATURATION: 99 % | SYSTOLIC BLOOD PRESSURE: 111 MMHG | WEIGHT: 145.8 LBS | HEART RATE: 107 BPM

## 2023-08-30 DIAGNOSIS — M79.7 FIBROMYALGIA: ICD-10-CM

## 2023-08-30 DIAGNOSIS — M79.2 NEUROPATHIC PAIN: ICD-10-CM

## 2023-08-30 DIAGNOSIS — E53.8 B12 DEFICIENCY: ICD-10-CM

## 2023-08-30 DIAGNOSIS — K50.118 CROHN'S DISEASE OF COLON WITH OTHER COMPLICATION (HCC): Primary | ICD-10-CM

## 2023-08-30 DIAGNOSIS — M46.1 SACROILIITIS (HCC): ICD-10-CM

## 2023-08-30 PROCEDURE — G8427 DOCREV CUR MEDS BY ELIG CLIN: HCPCS | Performed by: NURSE PRACTITIONER

## 2023-08-30 PROCEDURE — 99213 OFFICE O/P EST LOW 20 MIN: CPT | Performed by: NURSE PRACTITIONER

## 2023-08-30 PROCEDURE — G8417 CALC BMI ABV UP PARAM F/U: HCPCS | Performed by: NURSE PRACTITIONER

## 2023-08-30 PROCEDURE — 4004F PT TOBACCO SCREEN RCVD TLK: CPT | Performed by: NURSE PRACTITIONER

## 2023-08-30 RX ORDER — CYANOCOBALAMIN 1000 UG/ML
1000 INJECTION, SOLUTION INTRAMUSCULAR; SUBCUTANEOUS
Qty: 1 EACH | Refills: 4 | Status: SHIPPED | OUTPATIENT
Start: 2023-08-30

## 2023-08-30 RX ORDER — GABAPENTIN 800 MG/1
800 TABLET ORAL 3 TIMES DAILY
Qty: 90 TABLET | Refills: 2 | Status: SHIPPED | OUTPATIENT
Start: 2023-08-30 | End: 2023-09-29

## 2023-08-30 RX ORDER — TIZANIDINE 4 MG/1
TABLET ORAL
COMMUNITY
Start: 2023-07-28

## 2023-08-30 RX ORDER — CYANOCOBALAMIN 1000 UG/ML
INJECTION, SOLUTION INTRAMUSCULAR; SUBCUTANEOUS
COMMUNITY
Start: 2023-07-24 | End: 2023-08-30 | Stop reason: SDUPTHER

## 2023-08-30 ASSESSMENT — ENCOUNTER SYMPTOMS
RESPIRATORY NEGATIVE: 1
EYES NEGATIVE: 1
GASTROINTESTINAL NEGATIVE: 1
COLOR CHANGE: 1
ALLERGIC/IMMUNOLOGIC NEGATIVE: 1

## 2023-08-30 NOTE — PROGRESS NOTES
Chief Complaint   Patient presents with    Chronic Pain     Fibromyalgia    Crohn's Disease       Have you seen any other physician or provider since your last visit yes - Crohn's doctor,Rheumatology    Have you had any other diagnostic tests since your last visit? no    Have you changed or stopped any medications since your last visit? yes - stopped Stelara and started [employer]. Stopped Norethindrone, Cyproheptadine     I have recommended that this patient have a immunization for pneumonia but she due to refusal reason: not comfortable with test   I have discussed the risks and benefits of this examination with her. The patient verbalizes understanding. Provider will be informed of refusal.         SUBJECTIVE:    Patient Mary Pacheco is a 34 y.o. female. Chief Complaint   Patient presents with    Chronic Pain     Fibromyalgia    Crohn's Disease     HPI:  Patient is here to follow up on chronic pain for Fibromyalgia. She is asking for a RX for Ergocalciferol and a multi vitamin. She was seen yesterday for upper respiratory infection. She is not on antibiotic. She is on new medication for crohn's. She was seen by rheumatology and her Crohns doctor. She was started on Zanaflex. She was given prednisone. And is on Skyrizi for her Crohns. Patient's medications, allergies, past medical, surgical, social and family histories were reviewed and updated as appropriate. Review of Systems   Constitutional: Negative. HENT: Negative. Eyes: Negative. Respiratory: Negative. Cardiovascular: Negative. Gastrointestinal: Negative. Endocrine: Negative. Genitourinary: Negative. Musculoskeletal: Negative. Skin:  Positive for color change. Allergic/Immunologic: Negative. Neurological: Negative. Hematological: Negative. Psychiatric/Behavioral: Negative.          OBJECTIVE:  /73 (Site: Right Upper Arm, Position: Sitting, Cuff Size: Medium Adult)   Pulse (!) 107   Temp
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
## 2023-09-04 ENCOUNTER — OFFICE VISIT (OUTPATIENT)
Dept: PSYCHIATRY | Facility: HOSPITAL | Age: 33
End: 2023-09-04
Payer: COMMERCIAL

## 2023-09-04 DIAGNOSIS — F15.21 METHAMPHETAMINE USE DISORDER, SEVERE, IN SUSTAINED REMISSION: Primary | ICD-10-CM

## 2023-09-04 DIAGNOSIS — F11.11 OPIOID USE DISORDER, MILD, IN EARLY REMISSION: ICD-10-CM

## 2023-09-04 DIAGNOSIS — F12.11 CANNABIS USE DISORDER, MILD, IN EARLY REMISSION: ICD-10-CM

## 2023-09-04 PROCEDURE — H0015 ALCOHOL AND/OR DRUG SERVICES: HCPCS | Performed by: NURSE PRACTITIONER

## 2023-09-07 ENCOUNTER — DOCUMENTATION (OUTPATIENT)
Dept: PSYCHIATRY | Facility: HOSPITAL | Age: 33
End: 2023-09-07
Payer: COMMERCIAL

## 2023-09-07 NOTE — PROGRESS NOTES
Clinician this date (9/7/2023) called patient due to patient missing CD-IOP class on 9/6/2023 so that attendance could be discussed. Clinician received a voicemail not set up yet message.     -Jaylen Haque LCSW.   9/7/2023.

## 2023-09-07 NOTE — PROGRESS NOTES
Jessica, MSW student, called [place] national IOP line ([phone]) on 9/6/2023. In completing pre-auth, Irina INTERIANO stated that based on tax ID number, we were out of network. This clinician called [facility] on 9/7/2023 and spoke with Sahil who stated that [facility] had different NPI number than what they had. Upon consultation with REED Perez, clinic manager with [facility], correct tax ID number was clarified.     Clinician called [facility] again on 9/7/2023 at 01:54 PM and spoke with Agatha RUFINO. Clinician requested start date of 9/4/2023, but clinician was told that claim does not allow backdating and 9/7/2023 would be start date. Clinician provided requested clinical information. Per Agatha, a peer reviewer will reach out. Per Agatha, they would like their doctor to speak with our provider (IRINEO Hanson) for peer to peer review. Agatha stated that they would call to schedule this peer review. Clinician was told that the reference would be her name (Agatha JOY) and date (9/7/2023) and time (2:45 PM).     Pre-Authorization is currently awaiting [employer] to call us with a date to schedule peer to peer review with IRINEO Trammell.     -Jaylen Haque [facility].   9/7/2023.

## 2023-09-11 ENCOUNTER — TELEPHONE (OUTPATIENT)
Dept: PSYCHIATRY | Facility: CLINIC | Age: 33
End: 2023-09-11
Payer: COMMERCIAL

## 2023-09-11 ENCOUNTER — DOCUMENTATION (OUTPATIENT)
Dept: PSYCHIATRY | Facility: HOSPITAL | Age: 33
End: 2023-09-11
Payer: COMMERCIAL

## 2023-09-11 NOTE — PROGRESS NOTES
Clinician this date (9/11/2023) called patient ([phone]) at approximately 09:43 AM. Clinician was calling regarding CD-IOP attendance. Patient missed CD-IOP class on 9/6/2023 and 9/7/2023. No answer, received voicemail not set up yet message. Clinician previously tried to call patient on 9/7/2023 (see note in chart from that date.     If clinician does not hear back from patient, discharge from CD-IOP will be considered.      -Jaylen Haque LCSW.   9/11/2023.

## 2023-09-13 ENCOUNTER — DOCUMENTATION (OUTPATIENT)
Dept: PSYCHIATRY | Facility: HOSPITAL | Age: 33
End: 2023-09-13
Payer: COMMERCIAL

## 2023-09-13 NOTE — PROGRESS NOTES
Clinician this date (9/13/2023) attempted to call patient regarding CD-IOP attendance. Clinician called patient at approximately 09:12 AM at [phone]. Clinician received voicemailbox not set up yet message. Patient will be discharged from CD-IOP.     -Jaylen Haque LCSW.   9/13/2023.

## 2023-09-13 NOTE — PROGRESS NOTES
Noncompliant IOP Discharge  (less than three days attended)    NAME: Shira Toro  DATE: 09/13/2023    Patient completed CD-IOP intake on 8/28/2023 and was determined to meet level of care due to methamphetamine use disorder, severe, in sustained remission; Cannabis use disorder, mild, in early remission; and Opioid use disorder, mild, in early remission. Patient attended first class of CD-IOP on 9/4/2023. Patient did not attend any further classes. Clinician called patient on 9/7/2023, 9/11/2023, and 9/13/2023 to discuss attendance, but was unsuccessful in these phone call attempts. Patient will be discharged this date due to attendance.      Jaylen Haque LCSW.  9/13/2023.

## 2023-09-18 ENCOUNTER — APPOINTMENT (OUTPATIENT)
Dept: PSYCHIATRY | Facility: HOSPITAL | Age: 33
End: 2023-09-18
Payer: COMMERCIAL

## 2023-09-20 ENCOUNTER — APPOINTMENT (OUTPATIENT)
Dept: PSYCHIATRY | Facility: HOSPITAL | Age: 33
End: 2023-09-20
Payer: COMMERCIAL

## 2023-09-21 ENCOUNTER — APPOINTMENT (OUTPATIENT)
Dept: PSYCHIATRY | Facility: HOSPITAL | Age: 33
End: 2023-09-21
Payer: COMMERCIAL

## 2023-09-25 ENCOUNTER — APPOINTMENT (OUTPATIENT)
Dept: PSYCHIATRY | Facility: HOSPITAL | Age: 33
End: 2023-09-25
Payer: COMMERCIAL

## 2023-09-26 NOTE — PROGRESS NOTES
"[facility] GROUP     Date: 09/04/2023  Name: Shira Toro    Time In: 1800   Time Out: Approximately 2045     Number of participants: 9    IOP GROUP NOTE     Data: 3 hour IOP group therapy session (Check-ins, Coping Skills, Relapse Prevention)     Check Ins: Therapist continued facilitation of rapport building strategies between group members. Therapist asked that each patient check in with home life and recovery efforts and identify triggers, cravings, and high risk situations that arise between group sessions. Therapist provided empathy and support during group session.     Session Content/Coping Skills: Introductions completed. Check ins completed by group members. Living in Balance- Session 3 (triggers, cravings, and avoiding relapse)- Part one completed.     Response: Patient attended class in person. Patient participated in completion of check in form. Patient on check in form answered no to question of \"currently or in the past 7 days, have you had any suicidal thoughts or plan or intent to hurt yourself”, and patient also answered no on check in form to question of \"currently or in the past 7 days, have you had any homicidal thoughts or plan or intent to hurt others\".     Personal Assessment 0-10 Scale (0-none, 10-high)    Anxiety:  8   Depression:  4   Cravings: 0     Assessment:     ..  No visits with results within 3 Week(s) from this visit.   Latest known visit with results is:   Lab on 08/04/2023   Component Date Value Ref Range Status    THC, Screen, Urine 08/04/2023 Negative  Negative Final    Phencyclidine (PCP), Urine 08/04/2023 Negative  Negative Final    Cocaine Screen, Urine 08/04/2023 Negative  Negative Final    Methamphetamine, Ur 08/04/2023 Negative  Negative Final    Opiate Screen 08/04/2023 Negative  Negative Final    Amphetamine Screen, Urine 08/04/2023 Negative  Negative Final    Benzodiazepine Screen, Urine 08/04/2023 Negative  Negative Final    Tricyclic Antidepressants Screen 08/04/2023 " Negative  Negative Final    Methadone Screen, Urine 08/04/2023 Negative  Negative Final    Barbiturates Screen, Urine 08/04/2023 Negative  Negative Final    Oxycodone Screen, Urine 08/04/2023 Negative  Negative Final    Propoxyphene Screen 08/04/2023 Negative  Negative Final    Buprenorphine, Screen, Urine 08/04/2023 Negative  Negative Final    PH 08/04/2023 9.0  4.5 - 9 Final    CREATININE 08/04/2023 72.3  20 - 300 mg/dL mg/dL Final    SPECIFIC GRAVITY 08/04/2023 1.013  1.003 - 1.035 Final    CODEINE 08/04/2023 Negative  50 ng/ml ng/ml Final    HYDROCODONE 08/04/2023 Negative  50 ng/ml ng/ml Final    NORHYDROCODONE 08/04/2023 Negative  50 ng/ml ng/ml Final    HYDROMORPHONE 08/04/2023 Negative  50 ng/ml ng/ml Final    MORPHINE 08/04/2023 Negative  50 ng/ml ng/ml Final    FENTANYL 08/04/2023 Negative  2 ng/ml ng/ml Final    NORFENTANYL 08/04/2023 Negative  10 ng/ml ng/ml Final    METHADONE 08/04/2023 Negative  25 ng/ml ng/ml Final    EDDP 08/04/2023 Negative  50 ng/ml ng/ml Final    OXYCODONE 08/04/2023 Negative  50 ng/ml ng/ml Final    NOROXYCODONE 08/04/2023 Negative  50 ng/ml ng/ml Final    OXYMORPHONE 08/04/2023 Negative  50 ng/ml ng/ml Final    TAPENTADOL 08/04/2023 Negative  50 ng/ml ng/ml Final    TRAMADOL 08/04/2023 Negative  50 ng/ml ng/ml Final    BUPRENORPHINE 08/04/2023 Negative  7.5 ng/ml ng/ml Final    NORBUPRENORPHINE 08/04/2023 Negative  10 ng/ml ng/ml Final    AMOBARBITAL 08/04/2023 Negative  100 ng/ml ng/ml Final    BUTABARBITAL 08/04/2023 Negative  100 ng/ml ng/ml Final    BUTALBITAL 08/04/2023 Negative  100 ng/ml ng/ml Final    PHENOBARBITAL 08/04/2023 Negative  100 ng/ml ng/ml Final    SECOBARBITAL 08/04/2023 Negative  100 ng/ml ng/ml Final    ALPRAZOLAM 08/04/2023 Negative  50 ng/ml ng/ml Final    ALPHA-HYDROXYALPRAZOLAM 08/04/2023 Negative  50 ng/ml ng/ml Final    CLONAZEPAM 08/04/2023 Negative  50 ng/ml ng/ml Final    7- AMINOCLONAZEPAM 08/04/2023 Negative  50 ng/ml ng/ml Final    DIAZEPAM  08/04/2023 Negative  50 ng/ml ng/ml Final    NORDIAZEPAM 08/04/2023 Negative  50 ng/ml ng/ml Final    LORAZEPAM 08/04/2023 Negative  100 ng/ml ng/ml Final    MIDAZOLAM 08/04/2023 Negative  50 ng/ml ng/ml Final    ALPHA-HYDROXYMIDAZOLAM 08/04/2023 Negative  50 ng/ml ng/ml Final    OXAZEPAM 08/04/2023 Negative  50 ng/ml ng/ml Final    TEMAZEPAM 08/04/2023 Negative  50 ng/ml ng/ml Final    ETG 08/04/2023 >4000 (C)  200 ng/ml ng/ml Final    BENZOYLECGONINE 08/04/2023 Negative  100 ng/ml ng/ml Final    6-LOVE 08/04/2023 Negative  10 ng/ml ng/ml Final    MDMA 08/04/2023 Negative  100 ng/ml ng/ml Final    PCP 08/04/2023 Negative  20 ng/ml ng/ml Final    DELTA-9-THC 08/04/2023 Negative  20 ng/ml ng/ml Final    AMPHETAMINE 08/04/2023 Negative  250 ng/ml ng/ml Final    METHAMPHETAMINE 08/04/2023 Negative  100 ng/ml ng/ml Final    METHYLPHENIDATE 08/04/2023 Negative  10 ng/ml ng/ml Final    PHENTERMINE 08/04/2023 Negative  100 ng/ml ng/ml Final    RITALINIC ACID 08/04/2023 Negative  50 ng/ml ng/ml Final    CARISOPRODOL 08/04/2023 Negative  100 ng/ml ng/ml Final    GABAPENTIN 08/04/2023 >52022 (C)  500 ng/ml ng/ml Final    PREGABALIN 08/04/2023 Negative  250 ng/ml ng/ml Final    ZOLPIDEM 08/04/2023 Negative  2 ng/ml ng/ml Final    CARBOXYZOLPIDEM 08/04/2023 Negative  10 ng/ml ng/ml Final       Mental Status Exam  Hygiene:  good  Dress: casual  Attitude: cooperative and agreeable   Motor Activity: appropriate  Eye Contact:  good  Speech: regular rate and rhythm   Mood:  calm and cooperative  Affect:  Appropriate  Thought Processes:  Linear  Thought Content:  Normal  Suicidal Thoughts:  denies  Homicidal Thoughts:  denies  Crisis Safety Plan: Safety plan has been discussed.   Hallucinations:  Unknown to clinician.   Reliability: fair  Insight: fair  Judgement: fair  Impulse Control: fair    Recovery/spiritual support group attendance: Patient on check in form reported yes 2.      Progress toward goal: Not at goal    Prognosis:  Fair with Ongoing Treatment     Self-reported number of days sober: Patient reported N/A on check in form.     Patient will contact this office, call 911 or present to the nearest emergency room should suicidal or homicidal ideations occur.    Impression/Formulation:    ICD-10-CM ICD-9-CM   1. Methamphetamine use disorder, severe, in sustained remission  F15.21 304.43   2. Cannabis use disorder, mild, in early remission  F12.11 305.23   3. Opioid use disorder, mild, in early remission  F11.11 305.53       Clinical Maneuvering/Interventions: Therapist utilized a person-centered approach to build rapport with group member. Therapist implemented motivational interviewing techniques to assist client with exploring and resolving ambivalence associated with commitment to change behaviors related to substance use and addiction. Therapist applied cognitive behavioral strategies to facilitate identification of maladaptive patterns of thinking and behavior that contribute to client's risk for continued substance use and relapse. Therapist employed group interaction activities to build rapport among group members, promote sobriety, and emphasize relapse prevention. Therapist promoted safe nonjudgmental environment by providing group members with unconditional positive regard and encouraging group members to comply with group rules and guidelines. Therapist assisted group member with identifying and implementing healthier coping strategies.      Plan:  Continue [facility] IOP Phase I   Aftercare:  [facility] Phase II  Program Assignments:  Personal recovery plan, relapse prevention plan, attendance of recovery support group meetings, exploration of sponsorship, drug/alcohol screens.     Jaylen Haque LCSW  9/26/2023  14:53 EDT

## 2023-09-27 ENCOUNTER — APPOINTMENT (OUTPATIENT)
Dept: PSYCHIATRY | Facility: HOSPITAL | Age: 33
End: 2023-09-27
Payer: COMMERCIAL

## 2023-09-28 ENCOUNTER — APPOINTMENT (OUTPATIENT)
Dept: PSYCHIATRY | Facility: HOSPITAL | Age: 33
End: 2023-09-28
Payer: COMMERCIAL

## 2023-10-25 ENCOUNTER — HOSPITAL ENCOUNTER (OUTPATIENT)
Dept: INFUSION THERAPY | Facility: HOSPITAL | Age: 33
Discharge: HOME | End: 2023-10-25
Admitting: INTERNAL MEDICINE
Payer: MEDICAID

## 2023-10-25 VITALS
SYSTOLIC BLOOD PRESSURE: 149 MMHG | RESPIRATION RATE: 18 BRPM | DIASTOLIC BLOOD PRESSURE: 86 MMHG | TEMPERATURE: 98.3 F | HEART RATE: 75 BPM | OXYGEN SATURATION: 99 %

## 2023-10-25 DIAGNOSIS — K50.90 CROHN'S DISEASE WITHOUT COMPLICATION, UNSPECIFIED GASTROINTESTINAL TRACT LOCATION: Primary | ICD-10-CM

## 2023-10-25 LAB
BASOPHILS # BLD AUTO: 0.07 10*3/MM3 (ref 0–0.2)
BASOPHILS NFR BLD AUTO: 0.9 % (ref 0–1.5)
CRP SERPL-MCNC: <0.3 MG/DL (ref 0–0.5)
DEPRECATED RDW RBC AUTO: 44.1 FL (ref 37–54)
EOSINOPHIL # BLD AUTO: 0.14 10*3/MM3 (ref 0–0.4)
EOSINOPHIL NFR BLD AUTO: 1.8 % (ref 0.3–6.2)
ERYTHROCYTE [DISTWIDTH] IN BLOOD BY AUTOMATED COUNT: 14.5 % (ref 12.3–15.4)
HCT VFR BLD AUTO: 38.5 % (ref 34–46.6)
HGB BLD-MCNC: 12.3 G/DL (ref 12–15.9)
IMM GRANULOCYTES # BLD AUTO: 0.02 10*3/MM3 (ref 0–0.05)
IMM GRANULOCYTES NFR BLD AUTO: 0.3 % (ref 0–0.5)
LYMPHOCYTES # BLD AUTO: 2.29 10*3/MM3 (ref 0.7–3.1)
LYMPHOCYTES NFR BLD AUTO: 29.6 % (ref 19.6–45.3)
MCH RBC QN AUTO: 26.9 PG (ref 26.6–33)
MCHC RBC AUTO-ENTMCNC: 31.9 G/DL (ref 31.5–35.7)
MCV RBC AUTO: 84.2 FL (ref 79–97)
MONOCYTES # BLD AUTO: 0.54 10*3/MM3 (ref 0.1–0.9)
MONOCYTES NFR BLD AUTO: 7 % (ref 5–12)
NEUTROPHILS NFR BLD AUTO: 4.68 10*3/MM3 (ref 1.7–7)
NEUTROPHILS NFR BLD AUTO: 60.4 % (ref 42.7–76)
NRBC BLD AUTO-RTO: 0 /100 WBC (ref 0–0.2)
PLATELET # BLD AUTO: 307 10*3/MM3 (ref 140–450)
PMV BLD AUTO: 9.4 FL (ref 6–12)
RBC # BLD AUTO: 4.57 10*6/MM3 (ref 3.77–5.28)
WBC NRBC COR # BLD: 7.74 10*3/MM3 (ref 3.4–10.8)

## 2023-10-25 PROCEDURE — 86140 C-REACTIVE PROTEIN: CPT | Performed by: INTERNAL MEDICINE

## 2023-10-25 PROCEDURE — 96365 THER/PROPH/DIAG IV INF INIT: CPT

## 2023-10-25 PROCEDURE — 85025 COMPLETE CBC W/AUTO DIFF WBC: CPT | Performed by: INTERNAL MEDICINE

## 2023-10-25 PROCEDURE — 25010000002 RISANKIZUMAB-RZAA 600 MG/10ML SOLUTION 10 ML VIAL: Performed by: INTERNAL MEDICINE

## 2023-10-25 PROCEDURE — 0 DEXTROSE 5 % SOLUTION 250 ML FLEX CONT: Performed by: INTERNAL MEDICINE

## 2023-10-25 RX ORDER — DEXTROSE MONOHYDRATE 50 MG/ML
20 INJECTION, SOLUTION INTRAVENOUS AS NEEDED
Start: 2023-11-17

## 2023-10-25 RX ORDER — EPINEPHRINE IN SOD CHLOR,ISO 1 MG/10 ML
0.3 SYRINGE (ML) INTRAVENOUS
Start: 2023-11-17

## 2023-10-25 RX ORDER — EPINEPHRINE IN SOD CHLOR,ISO 1 MG/10 ML
0.1 SYRINGE (ML) INTRAVENOUS
Start: 2023-11-17

## 2023-10-25 RX ORDER — FAMOTIDINE 10 MG/ML
20 INJECTION, SOLUTION INTRAVENOUS ONCE AS NEEDED
Start: 2023-11-17

## 2023-10-25 RX ORDER — DEXTROSE MONOHYDRATE 50 MG/ML
20 INJECTION, SOLUTION INTRAVENOUS AS NEEDED
Status: DISCONTINUED | OUTPATIENT
Start: 2023-10-25 | End: 2023-10-27 | Stop reason: HOSPADM

## 2023-10-25 RX ORDER — DIPHENHYDRAMINE HYDROCHLORIDE 50 MG/ML
50 INJECTION INTRAMUSCULAR; INTRAVENOUS ONCE AS NEEDED
OUTPATIENT
Start: 2023-11-17

## 2023-10-25 RX ORDER — ALBUTEROL SULFATE 90 UG/1
2 AEROSOL, METERED RESPIRATORY (INHALATION)
Start: 2023-11-17

## 2023-10-25 RX ORDER — METHYLPREDNISOLONE SODIUM SUCCINATE 40 MG/ML
40 INJECTION, POWDER, LYOPHILIZED, FOR SOLUTION INTRAMUSCULAR; INTRAVENOUS ONCE AS NEEDED
Start: 2023-11-17

## 2023-10-25 RX ADMIN — DEXTROSE MONOHYDRATE 600 MG: 50 INJECTION, SOLUTION INTRAVENOUS at 13:14

## 2023-10-30 ENCOUNTER — OFFICE VISIT (OUTPATIENT)
Dept: PRIMARY CARE CLINIC | Age: 33
End: 2023-10-30
Payer: COMMERCIAL

## 2023-10-30 VITALS
TEMPERATURE: 97.7 F | BODY MASS INDEX: 31.88 KG/M2 | WEIGHT: 162.4 LBS | OXYGEN SATURATION: 100 % | RESPIRATION RATE: 14 BRPM | HEART RATE: 95 BPM | DIASTOLIC BLOOD PRESSURE: 84 MMHG | HEIGHT: 60 IN | SYSTOLIC BLOOD PRESSURE: 123 MMHG

## 2023-10-30 DIAGNOSIS — M79.7 FIBROMYALGIA: ICD-10-CM

## 2023-10-30 DIAGNOSIS — M79.2 NEUROPATHIC PAIN: Primary | ICD-10-CM

## 2023-10-30 DIAGNOSIS — F41.9 ANXIETY: ICD-10-CM

## 2023-10-30 DIAGNOSIS — K50.118 CROHN'S DISEASE OF COLON WITH OTHER COMPLICATION (HCC): ICD-10-CM

## 2023-10-30 DIAGNOSIS — E53.8 B12 DEFICIENCY: ICD-10-CM

## 2023-10-30 PROCEDURE — G8417 CALC BMI ABV UP PARAM F/U: HCPCS | Performed by: NURSE PRACTITIONER

## 2023-10-30 PROCEDURE — 4004F PT TOBACCO SCREEN RCVD TLK: CPT | Performed by: NURSE PRACTITIONER

## 2023-10-30 PROCEDURE — 99214 OFFICE O/P EST MOD 30 MIN: CPT | Performed by: NURSE PRACTITIONER

## 2023-10-30 PROCEDURE — G8427 DOCREV CUR MEDS BY ELIG CLIN: HCPCS | Performed by: NURSE PRACTITIONER

## 2023-10-30 PROCEDURE — G8484 FLU IMMUNIZE NO ADMIN: HCPCS | Performed by: NURSE PRACTITIONER

## 2023-10-30 RX ORDER — HYDROXYZINE PAMOATE 25 MG/1
25 CAPSULE ORAL 2 TIMES DAILY PRN
Qty: 20 CAPSULE | Refills: 0 | Status: SHIPPED | OUTPATIENT
Start: 2023-10-30 | End: 2023-11-09

## 2023-10-30 RX ORDER — BUSPIRONE HYDROCHLORIDE 5 MG/1
5 TABLET ORAL 3 TIMES DAILY PRN
COMMUNITY
Start: 2023-09-12 | End: 2023-10-30 | Stop reason: DRUGHIGH

## 2023-10-30 RX ORDER — OMEPRAZOLE 20 MG/1
20 CAPSULE, DELAYED RELEASE ORAL DAILY
COMMUNITY
Start: 2023-09-26

## 2023-10-30 RX ORDER — QUETIAPINE FUMARATE 50 MG/1
TABLET, FILM COATED ORAL
COMMUNITY
Start: 2023-09-12 | End: 2023-10-30 | Stop reason: ALTCHOICE

## 2023-10-30 RX ORDER — CYANOCOBALAMIN 1000 UG/ML
1000 INJECTION, SOLUTION INTRAMUSCULAR; SUBCUTANEOUS
Qty: 1 EACH | Refills: 4 | Status: SHIPPED | OUTPATIENT
Start: 2023-10-30

## 2023-10-30 RX ORDER — TIZANIDINE 4 MG/1
4 TABLET ORAL EVERY 8 HOURS PRN
Qty: 60 TABLET | Refills: 3 | Status: SHIPPED | OUTPATIENT
Start: 2023-10-30

## 2023-10-30 RX ORDER — BUSPIRONE HYDROCHLORIDE 10 MG/1
10 TABLET ORAL 3 TIMES DAILY
Qty: 90 TABLET | Refills: 0 | Status: SHIPPED | OUTPATIENT
Start: 2023-10-30 | End: 2023-11-29

## 2023-10-30 RX ORDER — QUETIAPINE FUMARATE 50 MG/1
TABLET, FILM COATED ORAL
Qty: 60 TABLET | Refills: 3 | Status: SHIPPED | OUTPATIENT
Start: 2023-10-30

## 2023-10-30 ASSESSMENT — ENCOUNTER SYMPTOMS
EYES NEGATIVE: 1
ALLERGIC/IMMUNOLOGIC NEGATIVE: 1
RESPIRATORY NEGATIVE: 1
GASTROINTESTINAL NEGATIVE: 1

## 2023-10-30 NOTE — PROGRESS NOTES
Chief Complaint   Patient presents with    Chronic Pain    Crohn's Disease    Insomnia              Have you seen any other physician or provider since your last visit no    Have you had any other diagnostic tests since your last visit? yes - labs    Have you changed or stopped any medications since your last visit? yes - out of Seroquel x 2 weeks       I have recommended that this patient have a immunization for pneumonia but she due to refusal reason: not comfortable with test   I have discussed the risks and benefits of this examination with her. The patient verbalizes understanding. Provider will be informed of refusal.         SUBJECTIVE:    Patient Rosaline Mayo is a 34 y.o. female. Chief Complaint   Patient presents with    Chronic Pain    Crohn's Disease    Insomnia            HPI:  Patient is here to follow up  on Crohn's and Fibromyalgia. She is also having problems sleeping. She has been out of Seroquel for 2 weeks. Her mother has a seizure disorder and ended up with a cardiac event. She is trying to take care of her mother. She is in drug court and needs a letter for drug court for the [employer]. She says she hasn't missed any meetings. Patient has had a nerve problem for long time. Her sx have recently gotten worse. She easily get agitated and nervous. She has some difficulties falling and maintaining sleep at time. She denies any suicidal ideation. She has supportive family. Patient's medications, allergies, past medical, surgical, social and family histories were reviewed and updated as appropriate. Review of Systems   Constitutional: Negative. HENT: Negative. Eyes: Negative. Respiratory: Negative. Cardiovascular: Negative. Gastrointestinal: Negative. Endocrine: Negative. Genitourinary: Negative. Musculoskeletal: Negative. Skin: Negative. Allergic/Immunologic: Negative. Neurological: Negative. Hematological: Negative.

## 2023-11-13 ENCOUNTER — HOSPITAL ENCOUNTER (OUTPATIENT)
Facility: HOSPITAL | Age: 33
Discharge: HOME | End: 2023-11-13

## 2023-11-24 DIAGNOSIS — F41.9 ANXIETY: ICD-10-CM

## 2023-11-24 DIAGNOSIS — M79.2 NEUROPATHIC PAIN: ICD-10-CM

## 2023-11-24 DIAGNOSIS — M79.7 FIBROMYALGIA: ICD-10-CM

## 2023-11-24 DIAGNOSIS — K50.118 CROHN'S DISEASE OF COLON WITH OTHER COMPLICATION (HCC): ICD-10-CM

## 2023-11-27 RX ORDER — BUSPIRONE HYDROCHLORIDE 10 MG/1
10 TABLET ORAL 3 TIMES DAILY
Qty: 90 TABLET | Refills: 0 | Status: SHIPPED | OUTPATIENT
Start: 2023-11-27 | End: 2023-12-27

## 2023-11-27 RX ORDER — HYDROXYZINE PAMOATE 25 MG/1
25 CAPSULE ORAL 2 TIMES DAILY PRN
Qty: 20 CAPSULE | Refills: 0 | Status: SHIPPED | OUTPATIENT
Start: 2023-11-27 | End: 2023-12-07

## 2023-11-28 RX ORDER — GABAPENTIN 800 MG/1
800 TABLET ORAL 3 TIMES DAILY
Qty: 90 TABLET | Refills: 2 | Status: SHIPPED | OUTPATIENT
Start: 2023-11-28 | End: 2023-12-28

## 2024-02-06 DIAGNOSIS — M79.7 FIBROMYALGIA: ICD-10-CM

## 2024-02-07 RX ORDER — DULOXETIN HYDROCHLORIDE 60 MG/1
CAPSULE, DELAYED RELEASE ORAL
Qty: 30 CAPSULE | Refills: 3 | Status: SHIPPED | OUTPATIENT
Start: 2024-02-07

## 2024-03-06 DIAGNOSIS — M79.7 FIBROMYALGIA: ICD-10-CM

## 2024-03-06 DIAGNOSIS — F41.9 ANXIETY: ICD-10-CM

## 2024-03-06 RX ORDER — TIZANIDINE 4 MG/1
4 TABLET ORAL EVERY 8 HOURS PRN
Qty: 60 TABLET | Refills: 0 | Status: SHIPPED | OUTPATIENT
Start: 2024-03-06

## 2024-03-06 RX ORDER — QUETIAPINE FUMARATE 50 MG/1
TABLET, FILM COATED ORAL
Qty: 60 TABLET | Refills: 0 | Status: SHIPPED | OUTPATIENT
Start: 2024-03-06

## 2024-03-08 RX ORDER — BUPROPION HYDROCHLORIDE 150 MG/1
150 TABLET ORAL 2 TIMES DAILY
Qty: 60 TABLET | Refills: 0 | Status: SHIPPED | OUTPATIENT
Start: 2024-03-08

## 2024-04-01 DIAGNOSIS — M79.7 FIBROMYALGIA: ICD-10-CM

## 2024-04-01 DIAGNOSIS — M79.2 NEUROPATHIC PAIN: ICD-10-CM

## 2024-04-01 DIAGNOSIS — K50.118 CROHN'S DISEASE OF COLON WITH OTHER COMPLICATION (HCC): ICD-10-CM

## 2024-04-01 DIAGNOSIS — F41.9 ANXIETY: ICD-10-CM

## 2024-04-01 NOTE — TELEPHONE ENCOUNTER
----- Message from Shabana Wilson sent at 4/1/2024  3:20 PM EDT -----  Subject: Refill Request    QUESTIONS  Name of Medication? gabapentin (NEURONTIN) 800 MG tablet  Patient-reported dosage and instructions? 3 800 mg tablet daily   How many days do you have left? 2  Preferred Pharmacy? [facility]  Pharmacy phone number (if available)? [phone]  Additional Information for Provider? pt will be out of medication in a   couple days, she has scheduled a appt with pcp for 4/16. PCP has nothing   sooner than that.  ---------------------------------------------------------------------------  --------------,  Name of Medication? buPROPion (WELLBUTRIN XL) 150 MG extended release   tablet  Patient-reported dosage and instructions? 1 150mg twice a day   How many days do you have left? Unknown  Preferred Pharmacy? [facility]  Pharmacy phone number (if available)? [phone]  Additional Information for Provider? pt will be out of medication in a   couple days, she has scheduled a appt with pcp for 4/16. PCP has nothing   sooner than that.  ---------------------------------------------------------------------------  --------------,  Name of Medication? QUEtiapine (SEROQUEL) 50 MG tablet  Patient-reported dosage and instructions? 2 50 mg twice a day   How many days do you have left? 0  Preferred Pharmacy? [facility]  Pharmacy phone number (if available)? [phone]  Additional Information for Provider? pt will be out of medication in a   couple days, she has scheduled a appt with pcp for 4/16. PCP has nothing   sooner than that.  ---------------------------------------------------------------------------  --------------,  Name of Medication? tiZANidine (ZANAFLEX) 4 MG tablet  Patient-reported dosage and instructions? 2 4 mg tablet twice daily   How many days do you have left? 0  Preferred Pharmacy? [facility]  Pharmacy phone number (if available)?

## 2024-04-02 RX ORDER — GABAPENTIN 800 MG/1
800 TABLET ORAL 3 TIMES DAILY
Qty: 90 TABLET | Refills: 2 | OUTPATIENT
Start: 2024-04-02 | End: 2024-05-02

## 2024-04-02 RX ORDER — TIZANIDINE 4 MG/1
4 TABLET ORAL EVERY 8 HOURS PRN
Qty: 60 TABLET | Refills: 3 | OUTPATIENT
Start: 2024-04-02

## 2024-04-02 RX ORDER — TIZANIDINE 4 MG/1
4 TABLET ORAL EVERY 8 HOURS PRN
Qty: 60 TABLET | Refills: 0 | Status: SHIPPED | OUTPATIENT
Start: 2024-04-02

## 2024-04-02 RX ORDER — BUPROPION HYDROCHLORIDE 150 MG/1
150 TABLET ORAL 2 TIMES DAILY
Qty: 60 TABLET | Refills: 0 | Status: SHIPPED | OUTPATIENT
Start: 2024-04-02

## 2024-04-02 RX ORDER — QUETIAPINE FUMARATE 50 MG/1
TABLET, FILM COATED ORAL
Qty: 60 TABLET | Refills: 0 | Status: SHIPPED | OUTPATIENT
Start: 2024-04-02

## 2024-04-03 RX ORDER — GABAPENTIN 800 MG/1
800 TABLET ORAL 3 TIMES DAILY
Qty: 90 TABLET | Refills: 0 | Status: SHIPPED | OUTPATIENT
Start: 2024-04-03 | End: 2024-05-03

## 2024-04-13 ENCOUNTER — HOSPITAL ENCOUNTER (EMERGENCY)
Facility: HOSPITAL | Age: 34
Discharge: HOME | End: 2024-04-13
Attending: HOSPITALIST
Payer: COMMERCIAL

## 2024-04-13 VITALS
RESPIRATION RATE: 16 BRPM | SYSTOLIC BLOOD PRESSURE: 148 MMHG | HEART RATE: 101 BPM | WEIGHT: 160 LBS | BODY MASS INDEX: 31.41 KG/M2 | TEMPERATURE: 97.8 F | OXYGEN SATURATION: 97 % | DIASTOLIC BLOOD PRESSURE: 89 MMHG | HEIGHT: 60 IN

## 2024-04-13 DIAGNOSIS — Z77.21 EXPOSURE TO BLOOD: Primary | ICD-10-CM

## 2024-04-13 LAB
ALBUMIN SERPL-MCNC: 4.8 G/DL (ref 3.4–4.8)
ALBUMIN/GLOB SERPL: 1.6 {RATIO} (ref 0.8–2)
ALP SERPL-CCNC: 72 U/L (ref 25–100)
ALT SERPL-CCNC: 15 U/L (ref 4–36)
ANION GAP SERPL CALCULATED.3IONS-SCNC: 18 MMOL/L (ref 3–16)
AST SERPL-CCNC: 42 U/L (ref 8–33)
BASOPHILS # BLD: 0.1 K/UL (ref 0–0.1)
BASOPHILS NFR BLD: 0.3 %
BILIRUB SERPL-MCNC: 0.5 MG/DL (ref 0.3–1.2)
BUN SERPL-MCNC: 5 MG/DL (ref 6–20)
CALCIUM SERPL-MCNC: 9.1 MG/DL (ref 8.5–10.5)
CHLORIDE SERPL-SCNC: 99 MMOL/L (ref 98–107)
CO2 SERPL-SCNC: 18 MMOL/L (ref 20–30)
CREAT SERPL-MCNC: 0.6 MG/DL (ref 0.4–1.2)
EOSINOPHIL # BLD: 0 K/UL (ref 0–0.4)
EOSINOPHIL NFR BLD: 0.1 %
ERYTHROCYTE [DISTWIDTH] IN BLOOD BY AUTOMATED COUNT: 13.2 % (ref 11–16)
GFR SERPLBLD CREATININE-BSD FMLA CKD-EPI: >90 ML/MIN/{1.73_M2}
GLOBULIN SER CALC-MCNC: 3 G/DL
GLUCOSE SERPL-MCNC: 96 MG/DL (ref 74–106)
HCT VFR BLD AUTO: 40.7 % (ref 37–47)
HGB BLD-MCNC: 13.4 G/DL (ref 11.5–16.5)
IMM GRANULOCYTES # BLD: 0.1 K/UL
IMM GRANULOCYTES NFR BLD: 0.3 % (ref 0–5)
LYMPHOCYTES # BLD: 0.8 K/UL (ref 1.5–4)
LYMPHOCYTES NFR BLD: 5.3 %
MCH RBC QN AUTO: 28.9 PG (ref 27–32)
MCHC RBC AUTO-ENTMCNC: 32.9 G/DL (ref 31–35)
MCV RBC AUTO: 87.7 FL (ref 80–100)
MONOCYTES # BLD: 0.7 K/UL (ref 0.2–0.8)
MONOCYTES NFR BLD: 4.7 %
NEUTROPHILS # BLD: 13.7 K/UL (ref 2–7.5)
NEUTS SEG NFR BLD: 89.3 %
PLATELET # BLD AUTO: 301 K/UL (ref 150–400)
PMV BLD AUTO: 9.3 FL (ref 6–10)
POTASSIUM SERPL-SCNC: 3.4 MMOL/L (ref 3.4–5.1)
PROT SERPL-MCNC: 7.8 G/DL (ref 6.4–8.3)
RBC # BLD AUTO: 4.64 M/UL (ref 3.8–5.8)
SODIUM SERPL-SCNC: 135 MMOL/L (ref 136–145)
WBC # BLD AUTO: 15.3 K/UL (ref 4–11)

## 2024-04-13 PROCEDURE — 80053 COMPREHEN METABOLIC PANEL: CPT

## 2024-04-13 PROCEDURE — 87390 HIV-1 AG IA: CPT

## 2024-04-13 PROCEDURE — 86706 HEP B SURFACE ANTIBODY: CPT

## 2024-04-13 PROCEDURE — 99283 EMERGENCY DEPT VISIT LOW MDM: CPT

## 2024-04-13 PROCEDURE — 36415 COLL VENOUS BLD VENIPUNCTURE: CPT

## 2024-04-13 PROCEDURE — 86702 HIV-2 ANTIBODY: CPT

## 2024-04-13 PROCEDURE — 86803 HEPATITIS C AB TEST: CPT

## 2024-04-13 PROCEDURE — 86701 HIV-1ANTIBODY: CPT

## 2024-04-13 PROCEDURE — 86704 HEP B CORE ANTIBODY TOTAL: CPT

## 2024-04-13 PROCEDURE — 85025 COMPLETE CBC W/AUTO DIFF WBC: CPT

## 2024-04-13 ASSESSMENT — PAIN - FUNCTIONAL ASSESSMENT: PAIN_FUNCTIONAL_ASSESSMENT: WONG-BAKER FACES

## 2024-04-13 ASSESSMENT — PAIN DESCRIPTION - ORIENTATION: ORIENTATION: RIGHT;LEFT

## 2024-04-13 ASSESSMENT — PAIN DESCRIPTION - LOCATION: LOCATION: WRIST

## 2024-04-13 ASSESSMENT — PAIN DESCRIPTION - PAIN TYPE: TYPE: ACUTE PAIN

## 2024-04-13 ASSESSMENT — PAIN DESCRIPTION - FREQUENCY: FREQUENCY: CONTINUOUS
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
## 2024-04-14 LAB
HBV SURFACE AB SERPL IA-ACNC: 352.3 MIU/ML
HCV AB SERPL QL IA: REACTIVE

## 2024-04-15 LAB
HBV CORE AB SERPL QL IA: NEGATIVE
HIV 1+2 AB+HIV1 P24 AG SERPL QL IA: NORMAL
HIV 2 AB SERPL QL IA: NORMAL
HIV1 AB SERPL QL IA: NORMAL
HIV1 P24 AG SERPL QL IA: NORMAL

## 2024-07-31 DIAGNOSIS — M79.2 NEUROPATHIC PAIN: ICD-10-CM

## 2024-07-31 DIAGNOSIS — F41.9 ANXIETY: ICD-10-CM

## 2024-07-31 DIAGNOSIS — M79.7 FIBROMYALGIA: ICD-10-CM

## 2024-07-31 DIAGNOSIS — K50.118 CROHN'S DISEASE OF COLON WITH OTHER COMPLICATION (HCC): ICD-10-CM

## 2024-08-01 RX ORDER — GABAPENTIN 800 MG/1
800 TABLET ORAL 3 TIMES DAILY
Qty: 90 TABLET | Refills: 0 | OUTPATIENT
Start: 2024-08-01

## 2024-08-01 RX ORDER — BUPROPION HYDROCHLORIDE 150 MG/1
150 TABLET ORAL 2 TIMES DAILY
Qty: 60 TABLET | Refills: 0 | OUTPATIENT
Start: 2024-08-01

## 2024-08-01 RX ORDER — QUETIAPINE FUMARATE 50 MG/1
TABLET, FILM COATED ORAL
Qty: 60 TABLET | Refills: 0 | OUTPATIENT
Start: 2024-08-01

## 2024-08-06 ENCOUNTER — APPOINTMENT (OUTPATIENT)
Dept: CT IMAGING | Facility: HOSPITAL | Age: 34
End: 2024-08-06
Payer: MEDICAID

## 2024-08-06 ENCOUNTER — APPOINTMENT (OUTPATIENT)
Dept: GENERAL RADIOLOGY | Facility: HOSPITAL | Age: 34
End: 2024-08-06
Payer: MEDICAID

## 2024-08-06 ENCOUNTER — HOSPITAL ENCOUNTER (EMERGENCY)
Facility: HOSPITAL | Age: 34
Discharge: HOME | End: 2024-08-07
Attending: EMERGENCY MEDICINE
Payer: MEDICAID

## 2024-08-06 DIAGNOSIS — L03.116 CELLULITIS OF LEFT LOWER EXTREMITY: ICD-10-CM

## 2024-08-06 DIAGNOSIS — F19.10 POLYSUBSTANCE ABUSE: Primary | ICD-10-CM

## 2024-08-06 LAB
ALBUMIN SERPL-MCNC: 3.4 G/DL (ref 3.5–5.2)
ALBUMIN/GLOB SERPL: 1.1 G/DL
ALP SERPL-CCNC: 64 U/L (ref 39–117)
ALT SERPL W P-5'-P-CCNC: 35 U/L (ref 1–33)
AMPHET+METHAMPHET UR QL: POSITIVE
AMPHETAMINES UR QL: POSITIVE
ANION GAP SERPL CALCULATED.3IONS-SCNC: 7 MMOL/L (ref 5–15)
APAP SERPL-MCNC: <5 MCG/ML (ref 0–30)
AST SERPL-CCNC: 112 U/L (ref 1–32)
BACTERIA UR QL AUTO: ABNORMAL /HPF
BARBITURATES UR QL SCN: NEGATIVE
BASOPHILS # BLD AUTO: 0.02 10*3/MM3 (ref 0–0.2)
BASOPHILS NFR BLD AUTO: 0.3 % (ref 0–1.5)
BENZODIAZ UR QL SCN: NEGATIVE
BILIRUB SERPL-MCNC: 0.2 MG/DL (ref 0–1.2)
BILIRUB UR QL STRIP: NEGATIVE
BUN SERPL-MCNC: 5 MG/DL (ref 6–20)
BUN/CREAT SERPL: 9.6 (ref 7–25)
BUPRENORPHINE SERPL-MCNC: POSITIVE NG/ML
CALCIUM SPEC-SCNC: 8.7 MG/DL (ref 8.6–10.5)
CANNABINOIDS SERPL QL: POSITIVE
CHLORIDE SERPL-SCNC: 105 MMOL/L (ref 98–107)
CLARITY UR: ABNORMAL
CO2 SERPL-SCNC: 27 MMOL/L (ref 22–29)
COCAINE UR QL: POSITIVE
COLOR UR: YELLOW
CREAT SERPL-MCNC: 0.52 MG/DL (ref 0.57–1)
D DIMER PPP FEU-MCNC: 1.42 MCGFEU/ML (ref 0–0.5)
D-LACTATE SERPL-SCNC: 0.7 MMOL/L (ref 0.5–2)
DEPRECATED RDW RBC AUTO: 39.1 FL (ref 37–54)
EGFRCR SERPLBLD CKD-EPI 2021: 126.8 ML/MIN/1.73
EOSINOPHIL # BLD AUTO: 0 10*3/MM3 (ref 0–0.4)
EOSINOPHIL NFR BLD AUTO: 0 % (ref 0.3–6.2)
ERYTHROCYTE [DISTWIDTH] IN BLOOD BY AUTOMATED COUNT: 12.1 % (ref 12.3–15.4)
ETHANOL BLD-MCNC: <10 MG/DL (ref 0–10)
ETHANOL UR QL: <0.01 %
FENTANYL UR-MCNC: NEGATIVE NG/ML
GLOBULIN UR ELPH-MCNC: 3.1 GM/DL
GLUCOSE SERPL-MCNC: 158 MG/DL (ref 65–99)
GLUCOSE UR STRIP-MCNC: NEGATIVE MG/DL
HAV IGM SERPL QL IA: ABNORMAL
HBV CORE IGM SERPL QL IA: ABNORMAL
HBV SURFACE AG SERPL QL IA: ABNORMAL
HCG SERPL QL: NEGATIVE
HCT VFR BLD AUTO: 34.1 % (ref 34–46.6)
HCV AB SER QL: REACTIVE
HGB BLD-MCNC: 11.6 G/DL (ref 12–15.9)
HGB UR QL STRIP.AUTO: ABNORMAL
HYALINE CASTS UR QL AUTO: ABNORMAL /LPF
IMM GRANULOCYTES # BLD AUTO: 0.03 10*3/MM3 (ref 0–0.05)
IMM GRANULOCYTES NFR BLD AUTO: 0.4 % (ref 0–0.5)
KETONES UR QL STRIP: ABNORMAL
LEUKOCYTE ESTERASE UR QL STRIP.AUTO: ABNORMAL
LYMPHOCYTES # BLD AUTO: 0.78 10*3/MM3 (ref 0.7–3.1)
LYMPHOCYTES NFR BLD AUTO: 10.1 % (ref 19.6–45.3)
MAGNESIUM SERPL-MCNC: 1.9 MG/DL (ref 1.6–2.6)
MCH RBC QN AUTO: 30.2 PG (ref 26.6–33)
MCHC RBC AUTO-ENTMCNC: 34 G/DL (ref 31.5–35.7)
MCV RBC AUTO: 88.8 FL (ref 79–97)
METHADONE UR QL SCN: NEGATIVE
MONOCYTES # BLD AUTO: 0.6 10*3/MM3 (ref 0.1–0.9)
MONOCYTES NFR BLD AUTO: 7.8 % (ref 5–12)
NEUTROPHILS NFR BLD AUTO: 6.3 10*3/MM3 (ref 1.7–7)
NEUTROPHILS NFR BLD AUTO: 81.4 % (ref 42.7–76)
NITRITE UR QL STRIP: NEGATIVE
NRBC BLD AUTO-RTO: 0 /100 WBC (ref 0–0.2)
OPIATES UR QL: NEGATIVE
OXYCODONE UR QL SCN: NEGATIVE
PCP UR QL SCN: NEGATIVE
PH UR STRIP.AUTO: 6.5 [PH] (ref 5–8)
PLATELET # BLD AUTO: 329 10*3/MM3 (ref 140–450)
PMV BLD AUTO: 8.7 FL (ref 6–12)
POTASSIUM SERPL-SCNC: 3.2 MMOL/L (ref 3.5–5.2)
PROCALCITONIN SERPL-MCNC: 0.1 NG/ML (ref 0–0.25)
PROT SERPL-MCNC: 6.5 G/DL (ref 6–8.5)
PROT UR QL STRIP: NEGATIVE
RBC # BLD AUTO: 3.84 10*6/MM3 (ref 3.77–5.28)
RBC # UR STRIP: ABNORMAL /HPF
REF LAB TEST METHOD: ABNORMAL
SALICYLATES SERPL-MCNC: <0.3 MG/DL
SODIUM SERPL-SCNC: 139 MMOL/L (ref 136–145)
SP GR UR STRIP: >1.03 (ref 1–1.03)
SQUAMOUS #/AREA URNS HPF: ABNORMAL /HPF
TRICYCLICS UR QL SCN: POSITIVE
TROPONIN T SERPL HS-MCNC: <6 NG/L
UROBILINOGEN UR QL STRIP: ABNORMAL
WBC # UR STRIP: ABNORMAL /HPF
WBC NRBC COR # BLD AUTO: 7.73 10*3/MM3 (ref 3.4–10.8)

## 2024-08-06 PROCEDURE — 71275 CT ANGIOGRAPHY CHEST: CPT

## 2024-08-06 PROCEDURE — 83605 ASSAY OF LACTIC ACID: CPT | Performed by: PHYSICIAN ASSISTANT

## 2024-08-06 PROCEDURE — 80179 DRUG ASSAY SALICYLATE: CPT | Performed by: PHYSICIAN ASSISTANT

## 2024-08-06 PROCEDURE — 80307 DRUG TEST PRSMV CHEM ANLYZR: CPT | Performed by: PHYSICIAN ASSISTANT

## 2024-08-06 PROCEDURE — 99285 EMERGENCY DEPT VISIT HI MDM: CPT

## 2024-08-06 PROCEDURE — 25510000001 IOPAMIDOL 61 % SOLUTION: Performed by: EMERGENCY MEDICINE

## 2024-08-06 PROCEDURE — 71045 X-RAY EXAM CHEST 1 VIEW: CPT

## 2024-08-06 PROCEDURE — 83735 ASSAY OF MAGNESIUM: CPT | Performed by: PHYSICIAN ASSISTANT

## 2024-08-06 PROCEDURE — 84484 ASSAY OF TROPONIN QUANT: CPT | Performed by: PHYSICIAN ASSISTANT

## 2024-08-06 PROCEDURE — 84145 PROCALCITONIN (PCT): CPT | Performed by: PHYSICIAN ASSISTANT

## 2024-08-06 PROCEDURE — 85025 COMPLETE CBC W/AUTO DIFF WBC: CPT | Performed by: PHYSICIAN ASSISTANT

## 2024-08-06 PROCEDURE — 70450 CT HEAD/BRAIN W/O DYE: CPT

## 2024-08-06 PROCEDURE — 80053 COMPREHEN METABOLIC PANEL: CPT | Performed by: PHYSICIAN ASSISTANT

## 2024-08-06 PROCEDURE — 80143 DRUG ASSAY ACETAMINOPHEN: CPT | Performed by: PHYSICIAN ASSISTANT

## 2024-08-06 PROCEDURE — 80074 ACUTE HEPATITIS PANEL: CPT | Performed by: PHYSICIAN ASSISTANT

## 2024-08-06 PROCEDURE — 81001 URINALYSIS AUTO W/SCOPE: CPT | Performed by: PHYSICIAN ASSISTANT

## 2024-08-06 PROCEDURE — 25810000003 SODIUM CHLORIDE 0.9 % SOLUTION: Performed by: PHYSICIAN ASSISTANT

## 2024-08-06 PROCEDURE — 85379 FIBRIN DEGRADATION QUANT: CPT | Performed by: PHYSICIAN ASSISTANT

## 2024-08-06 PROCEDURE — 82077 ASSAY SPEC XCP UR&BREATH IA: CPT | Performed by: PHYSICIAN ASSISTANT

## 2024-08-06 PROCEDURE — 84703 CHORIONIC GONADOTROPIN ASSAY: CPT | Performed by: PHYSICIAN ASSISTANT

## 2024-08-06 PROCEDURE — 93005 ELECTROCARDIOGRAM TRACING: CPT | Performed by: PHYSICIAN ASSISTANT

## 2024-08-06 RX ORDER — CEPHALEXIN 500 MG/1
500 CAPSULE ORAL 3 TIMES DAILY
Qty: 21 CAPSULE | Refills: 0 | Status: SHIPPED | OUTPATIENT
Start: 2024-08-06 | End: 2024-08-13

## 2024-08-06 RX ORDER — SODIUM CHLORIDE 0.9 % (FLUSH) 0.9 %
10 SYRINGE (ML) INJECTION AS NEEDED
Status: DISCONTINUED | OUTPATIENT
Start: 2024-08-06 | End: 2024-08-07 | Stop reason: HOSPADM

## 2024-08-06 RX ADMIN — SODIUM CHLORIDE 1000 ML: 9 INJECTION, SOLUTION INTRAVENOUS at 18:40

## 2024-08-06 RX ADMIN — IOPAMIDOL 100 ML: 612 INJECTION, SOLUTION INTRAVENOUS at 19:47

## 2024-08-06 NOTE — ED PROVIDER NOTES
EMERGENCY DEPARTMENT ENCOUNTER    Pt Name: Shira Toro  MRN: [id number]  Pt :   1990  Room Number:    Date of encounter:  2024  PCP: Provider, No Known  ED Provider: Bo Simon PA-C    Historian: Patient and EMS      HPI:  Chief Complaint   Patient presents with    Altered Mental Status          Context: Shira Toro is a 34 y.o. female who presents to the ED c/o altered mental status.  Apparently patient's uncle called the ambulance as she was altered.  History is limited as patient is unwilling to give answers to questions.  Briefly has a history of methamphetamine abuse and heroin abuse.  Admits to smoking marijuana last night.  Was found in her room with several empty bottles of several medications that are prescribed to her but from dates going back to the end of  until April of this year.  These include gabapentin, quetiapine, tizanidine, duloxetine, BuSpar, hydroxyzine patient denies chest pain or shortness of breath.  No abdominal pain.  She knows her in the hospital but is unwilling to tell me her name although EMS states she did after multiple times being asked.  Patient states all she was remember where she was asleep on her desk and then there were several people around her including EMS.        PAST MEDICAL HISTORY  Past Medical History:   Diagnosis Date    Anemia     Anxiety     Arthritis     Autoimmune disease     chrohns related.     Crohn's disease     Fibromyalgia     Substance abuse          PAST SURGICAL HISTORY  Past Surgical History:   Procedure Laterality Date    COLONOSCOPY      URACHUS EXCISION      urachus rupture as a baby         FAMILY HISTORY  Family History   Problem Relation Age of Onset    Coronary artery disease Mother     Heart attack Mother     Hypertension Mother     Hypertension Brother          SOCIAL HISTORY  Social History     Socioeconomic History    Marital status: Single   Tobacco Use    Smoking status: Former     Current  packs/day: 0.50     Types: Cigarettes     Passive exposure: Past    Smokeless tobacco: Never   Vaping Use    Vaping status: Every Day    Substances: Nicotine, Flavoring    Devices: Refillable tank    Passive vaping exposure: Yes   Substance and Sexual Activity    Alcohol use: Never    Sexual activity: Yes     Partners: Male     Birth control/protection: Nexplanon         ALLERGIES  Codeine, Ibuprofen, Naproxen, Ondansetron hcl, Tramadol, Acetaminophen-codeine, and Adalimumab        REVIEW OF SYSTEMS  Review of Systems   Unable to perform ROS: Mental status change   Respiratory:  Negative for shortness of breath.    Cardiovascular:  Negative for chest pain.   Gastrointestinal:  Negative for abdominal pain.   Neurological:  Negative for headaches.        Altered mental status        All systems reviewed and negative except for those discussed in HPI.       PHYSICAL EXAM    I have reviewed the triage vital signs and nursing notes.    ED Triage Vitals [08/06/24 1711]   Temp Heart Rate Resp BP SpO2   99.2 °F (37.3 °C) (!) 134 16 125/66 96 %      Temp src Heart Rate Source Patient Position BP Location FiO2 (%)   -- -- -- -- --       Physical Exam  Vitals and nursing note reviewed.   Constitutional:       Appearance: She is well-developed. She is obese.   HENT:      Head: Normocephalic.   Eyes:      Extraocular Movements: Extraocular movements intact.      Pupils: Pupils are equal, round, and reactive to light.      Comments: Some right periorbital ecchymosis   Cardiovascular:      Rate and Rhythm: Regular rhythm. Tachycardia present.      Heart sounds: Normal heart sounds.   Abdominal:      Palpations: Abdomen is soft.   Musculoskeletal:         General: Normal range of motion.      Cervical back: Normal range of motion.   Skin:     General: Skin is warm and dry.      Comments: Numerous scabbed sores to the bilateral forearms, edema and mild erythema to the bilateral lower extremities   Neurological:      Mental Status:  She is alert. She is disoriented.      GCS: GCS eye subscore is 4. GCS verbal subscore is 5. GCS motor subscore is 6.      Comments: Alert to person according to EMS, alert to place here to me, will not answer the year   Psychiatric:         Mood and Affect: Mood normal.            LAB RESULTS  Recent Results (from the past 24 hour(s))   Comprehensive Metabolic Panel    Collection Time: 08/06/24  5:28 PM    Specimen: Blood   Result Value Ref Range    Glucose 158 (H) 65 - 99 mg/dL    BUN 5 (L) 6 - 20 mg/dL    Creatinine 0.52 (L) 0.57 - 1.00 mg/dL    Sodium 139 136 - 145 mmol/L    Potassium 3.2 (L) 3.5 - 5.2 mmol/L    Chloride 105 98 - 107 mmol/L    CO2 27.0 22.0 - 29.0 mmol/L    Calcium 8.7 8.6 - 10.5 mg/dL    Total Protein 6.5 6.0 - 8.5 g/dL    Albumin 3.4 (L) 3.5 - 5.2 g/dL    ALT (SGPT) 35 (H) 1 - 33 U/L    AST (SGOT) 112 (H) 1 - 32 U/L    Alkaline Phosphatase 64 39 - 117 U/L    Total Bilirubin 0.2 0.0 - 1.2 mg/dL    Globulin 3.1 gm/dL    A/G Ratio 1.1 g/dL    BUN/Creatinine Ratio 9.6 7.0 - 25.0    Anion Gap 7.0 5.0 - 15.0 mmol/L    eGFR 126.8 >60.0 mL/min/1.73   hCG, Serum, Qualitative    Collection Time: 08/06/24  5:28 PM    Specimen: Blood   Result Value Ref Range    HCG Qualitative Negative Negative   Procalcitonin    Collection Time: 08/06/24  5:28 PM    Specimen: Blood   Result Value Ref Range    Procalcitonin 0.10 0.00 - 0.25 ng/mL   Acetaminophen Level    Collection Time: 08/06/24  5:28 PM    Specimen: Blood   Result Value Ref Range    Acetaminophen <5.0 0.0 - 30.0 mcg/mL   Ethanol    Collection Time: 08/06/24  5:28 PM    Specimen: Blood   Result Value Ref Range    Ethanol <10 0 - 10 mg/dL    Ethanol % <0.010 %   Salicylate Level    Collection Time: 08/06/24  5:28 PM    Specimen: Blood   Result Value Ref Range    Salicylate <0.3 <=30.0 mg/dL   Magnesium    Collection Time: 08/06/24  5:28 PM    Specimen: Blood   Result Value Ref Range    Magnesium 1.9 1.6 - 2.6 mg/dL   Single High Sensitivity Troponin T     Collection Time: 08/06/24  5:28 PM    Specimen: Blood   Result Value Ref Range    HS Troponin T <6 <14 ng/L   CBC Auto Differential    Collection Time: 08/06/24  5:28 PM    Specimen: Blood   Result Value Ref Range    WBC 7.73 3.40 - 10.80 10*3/mm3    RBC 3.84 3.77 - 5.28 10*6/mm3    Hemoglobin 11.6 (L) 12.0 - 15.9 g/dL    Hematocrit 34.1 34.0 - 46.6 %    MCV 88.8 79.0 - 97.0 fL    MCH 30.2 26.6 - 33.0 pg    MCHC 34.0 31.5 - 35.7 g/dL    RDW 12.1 (L) 12.3 - 15.4 %    RDW-SD 39.1 37.0 - 54.0 fl    MPV 8.7 6.0 - 12.0 fL    Platelets 329 140 - 450 10*3/mm3    Neutrophil % 81.4 (H) 42.7 - 76.0 %    Lymphocyte % 10.1 (L) 19.6 - 45.3 %    Monocyte % 7.8 5.0 - 12.0 %    Eosinophil % 0.0 (L) 0.3 - 6.2 %    Basophil % 0.3 0.0 - 1.5 %    Immature Grans % 0.4 0.0 - 0.5 %    Neutrophils, Absolute 6.30 1.70 - 7.00 10*3/mm3    Lymphocytes, Absolute 0.78 0.70 - 3.10 10*3/mm3    Monocytes, Absolute 0.60 0.10 - 0.90 10*3/mm3    Eosinophils, Absolute 0.00 0.00 - 0.40 10*3/mm3    Basophils, Absolute 0.02 0.00 - 0.20 10*3/mm3    Immature Grans, Absolute 0.03 0.00 - 0.05 10*3/mm3    nRBC 0.0 0.0 - 0.2 /100 WBC   D-dimer, Quantitative    Collection Time: 08/06/24  5:55 PM    Specimen: Blood   Result Value Ref Range    D-Dimer, Quantitative 1.42 (H) 0.00 - 0.50 MCGFEU/mL   Lactic Acid, Plasma    Collection Time: 08/06/24  7:39 PM    Specimen: Blood   Result Value Ref Range    Lactate 0.7 0.5 - 2.0 mmol/L   Urinalysis With Culture If Indicated - Urine, Clean Catch    Collection Time: 08/06/24  9:56 PM    Specimen: Urine, Clean Catch   Result Value Ref Range    Color, UA Yellow Yellow, Straw    Appearance, UA Cloudy (A) Clear    pH, UA 6.5 5.0 - 8.0    Specific Gravity, UA >1.030 (H) 1.005 - 1.030    Glucose, UA Negative Negative    Ketones, UA 15 mg/dL (1+) (A) Negative    Bilirubin, UA Negative Negative    Blood, UA Moderate (2+) (A) Negative    Protein, UA Negative Negative    Leuk Esterase, UA Trace (A) Negative    Nitrite, UA Negative  Negative    Urobilinogen, UA 0.2 E.U./dL 0.2 - 1.0 E.U./dL   Urine Drug Screen - Urine, Clean Catch    Collection Time: 08/06/24  9:56 PM    Specimen: Urine, Clean Catch   Result Value Ref Range    THC, Screen, Urine Positive (A) Negative    Phencyclidine (PCP), Urine Negative Negative    Cocaine Screen, Urine Positive (A) Negative    Methamphetamine, Ur Positive (A) Negative    Opiate Screen Negative Negative    Amphetamine Screen, Urine Positive (A) Negative    Benzodiazepine Screen, Urine Negative Negative    Tricyclic Antidepressants Screen Positive (A) Negative    Methadone Screen, Urine Negative Negative    Barbiturates Screen, Urine Negative Negative    Oxycodone Screen, Urine Negative Negative    Buprenorphine, Screen, Urine Positive (A) Negative   Fentanyl, Urine - Urine, Clean Catch    Collection Time: 08/06/24  9:56 PM    Specimen: Urine, Clean Catch   Result Value Ref Range    Fentanyl, Urine Negative Negative   Urinalysis, Microscopic Only - Urine, Clean Catch    Collection Time: 08/06/24  9:56 PM    Specimen: Urine, Clean Catch   Result Value Ref Range    RBC, UA None Seen None Seen, 0-2 /HPF    WBC, UA 0-2 None Seen, 0-2 /HPF    Bacteria, UA None Seen None Seen /HPF    Squamous Epithelial Cells, UA 13-20 (A) None Seen, 0-2 /HPF    Hyaline Casts, UA None Seen None Seen /LPF    Methodology Manual Light Microscopy        If labs were ordered, I independently reviewed the results and considered them in treating the patient.        RADIOLOGY  CT Angiogram Chest Pulmonary Embolism    Result Date: 8/6/2024  FINAL REPORT TECHNIQUE: Thin section axial images were obtained through the chest and during the arterial phase of IV contrast administration. Coronal 3-D MIP reconstructed images were also provided. CLINICAL HISTORY: elevated d-dimer, tachycardia, ams FINDINGS: The pulmonary arteries are well-opacified and there is no filling defect to indicate pulmonary embolism. The thoracic aorta is normal. The lungs  are clear. There is no pleural disease, adenopathy or significant osseous abnormality.     No pulmonary embolism. Authenticated and Electronically Signed by Terrell He M.D. on 08/06/2024 08:42:06 PM    CT Head Without Contrast    Result Date: 8/6/2024  FINAL REPORT TECHNIQUE: Axial images through the brain were performed by computed tomography. This study was performed with techniques to keep radiation doses as low as reasonably achievable (ALARA). Individualized dose reduction techniques using automated exposure control or adjustment of mA and/or kV according to the patient's size were employed. CLINICAL HISTORY: AMS FINDINGS: The ventricles are normal in size.  There is no extra-axial fluid or midline shift.  There is no evidence of acute hemorrhage.  No mass lesion is identified.  No acute sinus abnormality is seen.  There are no fractures.     Unremarkable. Authenticated and Electronically Signed by Terrell He M.D. on 08/06/2024 06:57:26 PM         PROCEDURES    Procedures    Interpretations    O2 Sat: The patients oxygen saturation was 96% on Room Air.  This was independently interpreted by me as Normal    EKG: I reviewed and independently interpreted the EKG as sinus rhythm, tachycardic with a rate of 134.  No ST segment elevations.    Cardiac Monitoring: I reviewed and independently interpreted the Rhythm Strip as Sinus Tachycardia rate of 140    Radiology: I ordered and independently reviewed the above noted radiographic studies.  I viewed images of CT Head which showed No intracranial hemorrhage per my independent interpretation. See radiologist's dictation for official interpretation.       MEDICATIONS GIVEN IN ER    Medications   sodium chloride 0.9 % flush 10 mL (has no administration in time range)   sodium chloride 0.9 % bolus 1,000 mL (0 mL Intravenous Stopped 8/6/24 2003)   iopamidol (ISOVUE-300) 61 % injection 100 mL (100 mL Intravenous Given 8/6/24 1947)         MEDICAL DECISION MAKING, PROGRESS, and  CONSULTS    All labs, if obtained, have been independently reviewed by me.  All radiology studies, if obtained, have been reviewed by me and the radiologist dictating the report.  All EKG's, if obtained, have been independently viewed and interpreted by me      Discussion below represents my analysis of pertinent findings related to patient's condition, differential diagnosis, treatment plan and final disposition.      Differential diagnosis:    Polysubstance abuse, electrolyte abnormality, PE, dehydration    Additional Sources:  None      Orders placed during this visit:  Orders Placed This Encounter   Procedures    XR Chest 1 View    CT Head Without Contrast    CT Angiogram Chest Pulmonary Embolism    Comprehensive Metabolic Panel    hCG, Serum, Qualitative    Urinalysis With Culture If Indicated - Urine, Clean Catch    Procalcitonin    Lactic Acid, Plasma    Acetaminophen Level    Ethanol    Urine Drug Screen - Urine, Clean Catch    Salicylate Level    Magnesium    Single High Sensitivity Troponin T    D-dimer, Quantitative    CBC Auto Differential    Hepatitis Panel, Acute    Fentanyl, Urine - Urine, Clean Catch    Urinalysis, Microscopic Only - Urine, Clean Catch    Straight cath    ECG 12 Lead Other; AMS    Insert Peripheral IV    CBC & Differential         Additional orders considered but not ordered:  None    ED Course:    Consultants:  None    ED Course as of 08/06/24 2311   Tue Aug 06, 2024   1745 Reviewing the pill bottles patient was brought in with shows:  Gabapentin 800 mg tablets #90 filled on/4/24 empty  Quetiapine 50 mg tablets #60 filled on 3/6/2024 approximately 30 in the bottle  Tizanidine 4 mg tablets filled on 7/8/2342 prescribed bottle about half empty  Duloxetine 60 mg tablets #30 filled on 1/11/2024 empty  Tizanidine 4 mg tablets #60 filled on 3/6/2024 approximately three quarters full  Duloxetine 60 mg tablets filled on 1/20/2330 dispensed empty bottle  BuSpar 10 mg tablets filled on  1/11/2024 #90, bottle three quarters full  Hydroxyzine 25 mg tablets filled on 10/30/23 approximately 15 remaining  Duloxetine 12/23/22 30 dispensed empty bottle [TM]   1749 WBC: 7.73 [TM]   1749 Hemoglobin(!): 11.6 [TM]   1749 Hematocrit: 34.1 [TM]   1753 EKG: I reviewed and independently interpreted the EKG as:  Sinus tach rate of 134, normal axis, normal interval, no ST elevation, no T wave inversion [CS]   1754 [place] Poison control recommends symptomatic treatment 6 to 8 hours observation until back to baseline.  States quetiapine hydroxyzine can cause anticholinergic symptoms, sleepiness and agitation, gabapentin can cause sleepiness, tizanidine altered mental status and lethargy and would more likely see bradycardia and hypertension with this.  Duloxetine BuSpar caused dizziness nausea vomiting. [TM]   1815 HCG Qualitative: Negative [TM]   1815 HS Troponin T: <6 [TM]   1815 Salicylate: <0.3 [TM]   1815 Acetaminophen: <5.0 [TM]   1815 Ethanol: <10 [TM]   1815 Magnesium: 1.9 [TM]   1815 BUN(!): 5 [TM]   1815 Creatinine(!): 0.52 [TM]   1815 Glucose(!): 158 [TM]   1815 Sodium: 139 [TM]   1815 ALT (SGPT)(!): 35 [TM]   1815 AST (SGOT)(!): 112 [TM]   1833 D-Dimer, Quant(!): 1.42 [TM]   2018 Lactate: 0.7 [TM]   2018 Procalcitonin: 0.10 [TM]   2134 Patient now sitting up in bed drinking water awake and alert.  Denies taking any of the pills and the bottles that were found around her at home.  Denies any SI.  Has no complaints of pain.  Is wishing for discharge home.  I did explain poison control recommended we observe her for 6 hours minimum.  She is agreeable to this at this time. [TM]   2223 Fentanyl, Urine: Negative [TM]   2229 THC Screen, Urine(!): Positive [TM]   2229 Cocaine Screen, Urine(!): Positive [TM]   2229 Methamphetamine, Ur(!): Positive [TM]   2229 Amphetamine, Urine Qual(!): Positive [TM]   2229 Tricyclic Antidepressants Screen(!): Positive [TM]   2229 Buprenorphine, Screen, Urine(!): Positive [TM]    2229 Fentanyl, Urine: Negative [TM]   2258 Patient is positive for THC, cocaine, methamphetamines, amphetamines, TCAs and buprenorphine. [TM]   2307 Patient resting in bed comfortably.  Heart rate has come down in the 1 teens.  Has no complaints.  She is wishing for discharge home.  Denies using cocaine methamphetamines amphetamines TCAs or buprenorphine. [TM]      ED Course User Index  [CS] Domingo Navarro MD  [TM] Bo Simon PA-C           After my consideration of clinical presentation and any laboratory/radiology studies obtained, I discussed the findings with the patient/patient representative who is in agreement with the treatment plan and the final disposition. Risks and benefits of discharge were discussed.     AS OF 23:11 EDT VITALS:    BP - 131/80  HR - 116  TEMP - 99.2 °F (37.3 °C)  O2 SATS - 98%    I reviewed the patients prescription monitoring report if available prior to discharge    DIAGNOSIS  Final diagnoses:   Polysubstance abuse   Cellulitis of left lower extremity         DISPOSITION  ED Disposition       ED Disposition   Discharge    Condition   Stable    Comment   --                   Please note that portions of this document were completed with voice recognition software.        Bo Simon PA-C  08/06/24 4521

## 2024-08-07 VITALS
HEIGHT: 60 IN | TEMPERATURE: 99.2 F | RESPIRATION RATE: 18 BRPM | HEART RATE: 94 BPM | DIASTOLIC BLOOD PRESSURE: 74 MMHG | OXYGEN SATURATION: 96 % | WEIGHT: 143.96 LBS | BODY MASS INDEX: 28.26 KG/M2 | SYSTOLIC BLOOD PRESSURE: 131 MMHG

## 2024-08-09 DIAGNOSIS — M79.7 FIBROMYALGIA: ICD-10-CM

## 2024-08-09 DIAGNOSIS — F41.9 ANXIETY: ICD-10-CM

## 2024-08-09 RX ORDER — BUPROPION HYDROCHLORIDE 150 MG/1
150 TABLET ORAL 2 TIMES DAILY
Qty: 60 TABLET | Refills: 0 | OUTPATIENT
Start: 2024-08-09

## 2024-08-09 RX ORDER — QUETIAPINE FUMARATE 50 MG/1
TABLET, FILM COATED ORAL
Qty: 60 TABLET | Refills: 0 | OUTPATIENT
Start: 2024-08-09

## 2024-09-09 ENCOUNTER — OFFICE VISIT (OUTPATIENT)
Dept: PRIMARY CARE CLINIC | Age: 34
End: 2024-09-09

## 2024-09-09 VITALS
HEART RATE: 117 BPM | TEMPERATURE: 98.2 F | WEIGHT: 148 LBS | BODY MASS INDEX: 29.06 KG/M2 | DIASTOLIC BLOOD PRESSURE: 75 MMHG | OXYGEN SATURATION: 98 % | HEIGHT: 60 IN | RESPIRATION RATE: 16 BRPM | SYSTOLIC BLOOD PRESSURE: 110 MMHG

## 2024-09-09 DIAGNOSIS — F41.9 ANXIETY: ICD-10-CM

## 2024-09-09 DIAGNOSIS — F19.90 POLYSUBSTANCE USE DISORDER: Primary | ICD-10-CM

## 2024-09-09 DIAGNOSIS — R11.0 NAUSEA: ICD-10-CM

## 2024-09-09 DIAGNOSIS — K50.919 ACUTE CROHN'S DISEASE WITH COMPLICATION (HCC): ICD-10-CM

## 2024-09-09 DIAGNOSIS — F32.9 REACTIVE DEPRESSION: ICD-10-CM

## 2024-09-09 DIAGNOSIS — K21.9 GASTROESOPHAGEAL REFLUX DISEASE, UNSPECIFIED WHETHER ESOPHAGITIS PRESENT: ICD-10-CM

## 2024-09-09 DIAGNOSIS — K50.118 CROHN'S DISEASE OF COLON WITH OTHER COMPLICATION (HCC): ICD-10-CM

## 2024-09-09 DIAGNOSIS — F19.90 SUBSTANCE USE DISORDER: ICD-10-CM

## 2024-09-09 DIAGNOSIS — M79.7 FIBROMYALGIA: ICD-10-CM

## 2024-09-09 DIAGNOSIS — F43.21 SITUATIONAL DEPRESSION: ICD-10-CM

## 2024-09-09 DIAGNOSIS — L02.211 CUTANEOUS ABSCESS OF ABDOMINAL WALL: ICD-10-CM

## 2024-09-09 DIAGNOSIS — M79.2 NEUROPATHIC PAIN: ICD-10-CM

## 2024-09-09 PROCEDURE — 99214 OFFICE O/P EST MOD 30 MIN: CPT | Performed by: NURSE PRACTITIONER

## 2024-09-09 RX ORDER — GABAPENTIN 800 MG/1
800 TABLET ORAL 3 TIMES DAILY
Qty: 90 TABLET | Refills: 0 | Status: CANCELLED | OUTPATIENT
Start: 2024-09-09 | End: 2024-10-09

## 2024-09-09 RX ORDER — PROMETHAZINE HYDROCHLORIDE 25 MG/1
25 TABLET ORAL 3 TIMES DAILY PRN
Qty: 12 TABLET | Refills: 0 | Status: SHIPPED | OUTPATIENT
Start: 2024-09-09 | End: 2024-09-16

## 2024-09-09 RX ORDER — FOLIC ACID 1 MG/1
1 TABLET ORAL DAILY
Qty: 30 TABLET | Refills: 3 | Status: SHIPPED | OUTPATIENT
Start: 2024-09-09

## 2024-09-09 RX ORDER — SILVER SULFADIAZINE 10 MG/G
CREAM TOPICAL
Qty: 60 G | Refills: 1 | Status: SHIPPED | OUTPATIENT
Start: 2024-09-09

## 2024-09-09 RX ORDER — BUPROPION HYDROCHLORIDE 150 MG/1
150 TABLET, FILM COATED, EXTENDED RELEASE ORAL 2 TIMES DAILY
Qty: 60 TABLET | Refills: 9 | Status: CANCELLED | OUTPATIENT
Start: 2024-09-09 | End: 2025-06-08

## 2024-09-09 RX ORDER — BUPROPION HYDROCHLORIDE 150 MG/1
150 TABLET ORAL 2 TIMES DAILY
Qty: 60 TABLET | Refills: 0 | Status: SHIPPED | OUTPATIENT
Start: 2024-09-09

## 2024-09-09 RX ORDER — QUETIAPINE FUMARATE 50 MG/1
TABLET, FILM COATED ORAL
Qty: 60 TABLET | Refills: 0 | Status: SHIPPED | OUTPATIENT
Start: 2024-09-09

## 2024-09-09 RX ORDER — SULFAMETHOXAZOLE/TRIMETHOPRIM 800-160 MG
1 TABLET ORAL 2 TIMES DAILY
Qty: 14 TABLET | Refills: 0 | Status: SHIPPED | OUTPATIENT
Start: 2024-09-09 | End: 2024-09-16

## 2024-09-09 RX ORDER — DULOXETIN HYDROCHLORIDE 60 MG/1
60 CAPSULE, DELAYED RELEASE ORAL EVERY MORNING
Qty: 30 CAPSULE | Refills: 3 | Status: SHIPPED | OUTPATIENT
Start: 2024-09-09

## 2024-09-09 ASSESSMENT — PATIENT HEALTH QUESTIONNAIRE - PHQ9
2. FEELING DOWN, DEPRESSED OR HOPELESS: NEARLY EVERY DAY
4. FEELING TIRED OR HAVING LITTLE ENERGY: NEARLY EVERY DAY
SUM OF ALL RESPONSES TO PHQ QUESTIONS 1-9: 18
SUM OF ALL RESPONSES TO PHQ QUESTIONS 1-9: 18
8. MOVING OR SPEAKING SO SLOWLY THAT OTHER PEOPLE COULD HAVE NOTICED. OR THE OPPOSITE, BEING SO FIGETY OR RESTLESS THAT YOU HAVE BEEN MOVING AROUND A LOT MORE THAN USUAL: SEVERAL DAYS
7. TROUBLE CONCENTRATING ON THINGS, SUCH AS READING THE NEWSPAPER OR WATCHING TELEVISION: NEARLY EVERY DAY
SUM OF ALL RESPONSES TO PHQ9 QUESTIONS 1 & 2: 6
10. IF YOU CHECKED OFF ANY PROBLEMS, HOW DIFFICULT HAVE THESE PROBLEMS MADE IT FOR YOU TO DO YOUR WORK, TAKE CARE OF THINGS AT HOME, OR GET ALONG WITH OTHER PEOPLE: SOMEWHAT DIFFICULT
9. THOUGHTS THAT YOU WOULD BE BETTER OFF DEAD, OR OF HURTING YOURSELF: NOT AT ALL
SUM OF ALL RESPONSES TO PHQ QUESTIONS 1-9: 18
SUM OF ALL RESPONSES TO PHQ QUESTIONS 1-9: 18
5. POOR APPETITE OR OVEREATING: SEVERAL DAYS
1. LITTLE INTEREST OR PLEASURE IN DOING THINGS: NEARLY EVERY DAY
3. TROUBLE FALLING OR STAYING ASLEEP: NEARLY EVERY DAY
6. FEELING BAD ABOUT YOURSELF - OR THAT YOU ARE A FAILURE OR HAVE LET YOURSELF OR YOUR FAMILY DOWN: SEVERAL DAYS

## 2024-09-09 ASSESSMENT — ENCOUNTER SYMPTOMS
NAUSEA: 1
RESPIRATORY NEGATIVE: 1
EYES NEGATIVE: 1
ALLERGIC/IMMUNOLOGIC NEGATIVE: 1

## 2024-09-10 ENCOUNTER — TELEPHONE (OUTPATIENT)
Facility: HOSPITAL | Age: 34
End: 2024-09-10

## 2024-09-10 NOTE — PROGRESS NOTES
Called patient, but there was no answer and no option to leave a voicemail. Support and I made QRT visit. There was no answer at the door.

## 2024-09-11 ENCOUNTER — SOCIAL WORK (OUTPATIENT)
Facility: HOSPITAL | Age: 34
End: 2024-09-11

## 2024-09-12 ENCOUNTER — OFFICE VISIT (OUTPATIENT)
Dept: FAMILY MEDICINE CLINIC | Facility: OTHER | Age: 34
End: 2024-09-12
Payer: MEDICAID

## 2024-09-12 VITALS
HEIGHT: 60 IN | SYSTOLIC BLOOD PRESSURE: 128 MMHG | DIASTOLIC BLOOD PRESSURE: 76 MMHG | BODY MASS INDEX: 28.47 KG/M2 | WEIGHT: 145 LBS | TEMPERATURE: 98.1 F | RESPIRATION RATE: 20 BRPM | OXYGEN SATURATION: 96 %

## 2024-09-12 DIAGNOSIS — B96.89 BACTERIAL SKIN INFECTION: Primary | ICD-10-CM

## 2024-09-12 DIAGNOSIS — L08.9 BACTERIAL SKIN INFECTION: Primary | ICD-10-CM

## 2024-09-12 PROCEDURE — 1159F MED LIST DOCD IN RCRD: CPT | Performed by: PHYSICIAN ASSISTANT

## 2024-09-12 PROCEDURE — 99203 OFFICE O/P NEW LOW 30 MIN: CPT | Performed by: PHYSICIAN ASSISTANT

## 2024-09-12 PROCEDURE — 1160F RVW MEDS BY RX/DR IN RCRD: CPT | Performed by: PHYSICIAN ASSISTANT

## 2024-09-12 RX ORDER — PROMETHAZINE HYDROCHLORIDE 25 MG/1
TABLET ORAL
COMMUNITY
Start: 2024-09-09

## 2024-09-12 RX ORDER — SILVER SULFADIAZINE 10 MG/G
1 CREAM TOPICAL 2 TIMES DAILY
Qty: 50 G | Refills: 0 | Status: SHIPPED | OUTPATIENT
Start: 2024-09-12

## 2024-09-12 RX ORDER — DOXYCYCLINE 100 MG/1
100 CAPSULE ORAL 2 TIMES DAILY
Qty: 20 CAPSULE | Refills: 0 | Status: SHIPPED | OUTPATIENT
Start: 2024-09-12 | End: 2024-09-22

## 2024-09-12 RX ORDER — QUETIAPINE FUMARATE 50 MG/1
TABLET, FILM COATED ORAL
COMMUNITY

## 2024-09-12 NOTE — PROGRESS NOTES
Office Visit      Patient Name: Shira Toro  : 1990   MRN: [id number]     Chief Complaint:    Chief Complaint   Patient presents with    Skin Problem     X1 month sores on abdomin        History of Present Illness: Shira Toro is a 34 y.o. female who is here today to discuss skin lesions. She has had lesions on lower abdomen for around 1 month. Lesions sometimes drain pus or blood. She saw her PCP who gave her Silvadene which helped but she ran out of it. She has had some nausea and decreased appetite for the last week which she feels is related to known Crohn's which she will follow-up with  for.       Subjective      I have reviewed and the following portions of the patient's history were updated as appropriate: past family history, past medical history, past social history, past surgical history and problem list.      Current Outpatient Medications:     buPROPion XL (WELLBUTRIN XL) 150 MG 24 hr tablet, Take 1 tablet by mouth 2 (Two) Times a Day., Disp: , Rfl:     gabapentin (NEURONTIN) 800 MG tablet, 1 tablet 3 (Three) Times a Day., Disp: , Rfl:     omeprazole (priLOSEC) 20 MG capsule, Take 1 capsule by mouth Daily., Disp: , Rfl:     promethazine (PHENERGAN) 25 MG tablet, TAKE 1 TABLET BY MOUTH 3 TIMES DAILY AS NEEDED FOR NAUSEA, Disp: , Rfl:     QUEtiapine (SEROquel) 50 MG tablet, TAKE 1-2 TABLETS BY MOUTH EVERY NIGHT AS NEEDED, Disp: , Rfl:     tiZANidine (ZANAFLEX) 4 MG tablet, , Disp: , Rfl:     cyanocobalamin 1000 MCG/ML injection, Inject 1 mL under the skin into the appropriate area as directed Every 30 (Thirty) Days., Disp: , Rfl:     doxycycline (VIBRAMYCIN) 100 MG capsule, Take 1 capsule by mouth 2 (Two) Times a Day for 10 days., Disp: 20 capsule, Rfl: 0    DULoxetine (CYMBALTA) 60 MG capsule, Take 1 capsule by mouth Every Morning. (Patient not taking: Reported on 2024), Disp: , Rfl:     folic acid (FOLVITE) 1 MG tablet, TAKE 1 TABLET (1 MG TOTAL) BY MOUTH 1 (ONE) TIME EACH  "DAY. (Patient not taking: Reported on 9/12/2024), Disp: , Rfl:     Golimumab 100 MG/ML solution auto-injector, Inject  under the skin into the appropriate area as directed., Disp: , Rfl:     loperamide (IMODIUM) 2 MG capsule, , Disp: , Rfl:     predniSONE (DELTASONE) 10 MG tablet, , Disp: , Rfl:     silver sulfadiazine (Silvadene) 1 % cream, Apply 1 Application topically to the appropriate area as directed 2 (Two) Times a Day., Disp: 50 g, Rfl: 0    Allergies   Allergen Reactions    Codeine      Tylenol 3.    Ibuprofen Diarrhea and Nausea And Vomiting     Contraindicated due to Crohn's Disease     Naproxen Unknown (See Comments)    Ondansetron Hcl Unknown (See Comments)     Makes patient sicker, with nausea and vomiting    Tramadol Unknown (See Comments)    Acetaminophen-Codeine Other (See Comments) and Unknown (See Comments)     GI cramps   GI cramps    Adalimumab Unknown (See Comments)       Objective     Vital Signs:   Vitals:    09/12/24 1209   BP: 128/76   Resp: 20   Temp: 98.1 °F (36.7 °C)   SpO2: 96%   Weight: 65.8 kg (145 lb)   Height: 152.4 cm (60\")     Body mass index is 28.32 kg/m².    Physical Exam  Vitals and nursing note reviewed.   Constitutional:       General: She is not in acute distress.     Appearance: She is well-developed. She is not ill-appearing, toxic-appearing or diaphoretic.   HENT:      Head: Normocephalic and atraumatic.   Eyes:      General: No scleral icterus.     Extraocular Movements: Extraocular movements intact.      Conjunctiva/sclera: Conjunctivae normal.      Pupils: Pupils are equal, round, and reactive to light.   Cardiovascular:      Rate and Rhythm: Normal rate and regular rhythm.      Heart sounds: Normal heart sounds. No murmur heard.     No friction rub. No gallop.   Pulmonary:      Effort: Pulmonary effort is normal. No respiratory distress.      Breath sounds: Normal breath sounds. No wheezing, rhonchi or rales.   Chest:      Chest wall: No tenderness.   Abdominal:      " General: Bowel sounds are normal.      Palpations: Abdomen is soft.      Tenderness: There is no abdominal tenderness.   Musculoskeletal:         General: No tenderness or deformity. Normal range of motion.      Cervical back: Normal range of motion and neck supple.      Right lower leg: No edema.      Left lower leg: No edema.   Skin:     General: Skin is warm and dry.      Capillary Refill: Capillary refill takes less than 2 seconds.      Findings: Wound (12-15 subcentimeter erythematous lesions with scabbing, no current discharge, non-tender) present. No erythema or rash.          Neurological:      Mental Status: She is alert and oriented to person, place, and time.      Cranial Nerves: No cranial nerve deficit.      Sensory: No sensory deficit.      Motor: No abnormal muscle tone.      Coordination: Coordination normal.      Deep Tendon Reflexes: Reflexes normal.   Psychiatric:         Mood and Affect: Mood normal.         Behavior: Behavior normal.         Thought Content: Thought content normal.         Judgment: Judgment normal.         Common labs          10/25/2023    13:23 4/13/2024    16:43 8/6/2024    17:28   Common Labs   Glucose   158    BUN   5    Creatinine   0.52    Sodium   139    Potassium   3.2    Chloride   105    Calcium   8.7    Albumin   3.4    Total Bilirubin   0.2    Alkaline Phosphatase   64    AST (SGOT)   112    ALT (SGPT)   35    WBC 7.74  15.3     7.73    Hemoglobin 12.3  13.4     11.6    Hematocrit 38.5  40.7     34.1    Platelets 307  301     329       Details          This result is from an external source.                 Assessment / Plan      Assessment/Plan:   Diagnoses and all orders for this visit:    1. Bacterial skin infection (Primary)  -     doxycycline (VIBRAMYCIN) 100 MG capsule; Take 1 capsule by mouth 2 (Two) Times a Day for 10 days.  Dispense: 20 capsule; Refill: 0  -     silver sulfadiazine (Silvadene) 1 % cream; Apply 1 Application topically to the appropriate  area as directed 2 (Two) Times a Day.  Dispense: 50 g; Refill: 0       Skin lesions consistent with skin picking and secondary bacterial infection. Unable to collect culture today on mobile clinic. Return in 1 week if not improving. ER with any acutely worsened symptoms.           Follow Up:   No follow-ups on file.    Patient was given instructions and counseling regarding her condition or for health maintenance advice. Please see specific information pulled into the AVS if appropriate.     Tova Cavanaugh PA-C  [facility]     Please note that portions of this note may have been completed with a voice recognition program.

## 2024-09-17 ENCOUNTER — SOCIAL WORK (OUTPATIENT)
Dept: SOCIAL WORK | Facility: HOSPITAL | Age: 34
End: 2024-09-17

## 2024-09-19 DIAGNOSIS — K50.118 CROHN'S DISEASE OF COLON WITH OTHER COMPLICATION (HCC): ICD-10-CM

## 2024-09-19 DIAGNOSIS — M79.7 FIBROMYALGIA: ICD-10-CM

## 2024-09-19 DIAGNOSIS — M79.2 NEUROPATHIC PAIN: ICD-10-CM

## 2024-09-19 NOTE — TELEPHONE ENCOUNTER
----- Message from Jeri REYES sent at 9/16/2024 12:45 PM EDT -----  Regarding: ECC Message to Provider  ECC Message to Provider    Relationship to Patient: Self     Additional Information Patient need to have refill for her medication gabapentin, antibiotic. Patient was told by her PCP that they will contact her for her antibiotic, she is waiting for the response.     --------------------------------------------------------------------------------------------------------------------------    Call Back Information: Do not leave any message, patient will call back for answer  Preferred Call Back Number: Phone [phone]

## 2024-09-20 RX ORDER — GABAPENTIN 800 MG/1
800 TABLET ORAL 3 TIMES DAILY
Qty: 90 TABLET | Refills: 0 | OUTPATIENT
Start: 2024-09-20 | End: 2024-10-20

## 2024-10-09 ENCOUNTER — HOSPITAL ENCOUNTER (OUTPATIENT)
Facility: HOSPITAL | Age: 34
Discharge: HOME | End: 2024-10-09

## 2024-10-16 ENCOUNTER — HOSPITAL ENCOUNTER (OUTPATIENT)
Facility: HOSPITAL | Age: 34
Discharge: HOME | End: 2024-10-16
Payer: MEDICAID

## 2024-10-16 LAB
25(OH)D3 SERPL-MCNC: 17.6 NG/ML (ref 32–100)
ALBUMIN SERPL-MCNC: 3.4 G/DL (ref 3.4–4.8)
ALBUMIN/GLOB SERPL: 1.1 {RATIO} (ref 0.8–2)
ALP SERPL-CCNC: 81 U/L (ref 25–100)
ALT SERPL-CCNC: 13 U/L (ref 4–36)
ANION GAP SERPL CALCULATED.3IONS-SCNC: 14 MMOL/L (ref 3–16)
AST SERPL-CCNC: 20 U/L (ref 8–33)
BASOPHILS # BLD: 0.1 K/UL (ref 0–0.1)
BASOPHILS NFR BLD: 0.5 %
BILIRUB SERPL-MCNC: <0.2 MG/DL (ref 0.3–1.2)
BUN SERPL-MCNC: 3 MG/DL (ref 6–20)
CALCIUM SERPL-MCNC: 8.8 MG/DL (ref 8.5–10.5)
CHLORIDE SERPL-SCNC: 100 MMOL/L (ref 98–107)
CHOLEST SERPL-MCNC: 113 MG/DL (ref 0–200)
CO2 SERPL-SCNC: 24 MMOL/L (ref 20–30)
CREAT SERPL-MCNC: 0.7 MG/DL (ref 0.4–1.2)
EOSINOPHIL # BLD: 0.1 K/UL (ref 0–0.4)
EOSINOPHIL NFR BLD: 0.6 %
ERYTHROCYTE [DISTWIDTH] IN BLOOD BY AUTOMATED COUNT: 14.1 % (ref 11–16)
FOLATE SERPL-MCNC: 4.52 NG/ML
GFR SERPLBLD CREATININE-BSD FMLA CKD-EPI: >90 ML/MIN/{1.73_M2}
GLOBULIN SER CALC-MCNC: 3.1 G/DL
GLUCOSE SERPL-MCNC: 99 MG/DL (ref 74–106)
HBA1C MFR BLD: 4.8 %
HCT VFR BLD AUTO: 36.1 % (ref 37–47)
HDLC SERPL-MCNC: 54 MG/DL (ref 40–60)
HGB BLD-MCNC: 11.4 G/DL (ref 11.5–16.5)
IMM GRANULOCYTES # BLD: 0 K/UL
IMM GRANULOCYTES NFR BLD: 0.3 % (ref 0–5)
LDLC SERPL CALC-MCNC: 46 MG/DL
LYMPHOCYTES # BLD: 2.8 K/UL (ref 1.5–4)
LYMPHOCYTES NFR BLD: 25.7 %
MCH RBC QN AUTO: 26.8 PG (ref 27–32)
MCHC RBC AUTO-ENTMCNC: 31.6 G/DL (ref 31–35)
MCV RBC AUTO: 84.7 FL (ref 80–100)
MONOCYTES # BLD: 0.5 K/UL (ref 0.2–0.8)
MONOCYTES NFR BLD: 4.5 %
NEUTROPHILS # BLD: 7.4 K/UL (ref 2–7.5)
NEUTS SEG NFR BLD: 68.4 %
PLATELET # BLD AUTO: 319 K/UL (ref 150–400)
PMV BLD AUTO: 8.8 FL (ref 6–10)
POTASSIUM SERPL-SCNC: 3.2 MMOL/L (ref 3.4–5.1)
PROT SERPL-MCNC: 6.5 G/DL (ref 6.4–8.3)
RBC # BLD AUTO: 4.26 M/UL (ref 3.8–5.8)
SODIUM SERPL-SCNC: 138 MMOL/L (ref 136–145)
TRIGL SERPL-MCNC: 67 MG/DL (ref 0–249)
TSH SERPL DL<=0.005 MIU/L-ACNC: 0.36 UIU/ML (ref 0.27–4.2)
VIT B12 SERPL-MCNC: 656 PG/ML (ref 211–911)
VLDLC SERPL CALC-MCNC: 13 MG/DL
WBC # BLD AUTO: 10.9 K/UL (ref 4–11)

## 2024-10-16 PROCEDURE — 84443 ASSAY THYROID STIM HORMONE: CPT

## 2024-10-16 PROCEDURE — 85025 COMPLETE CBC W/AUTO DIFF WBC: CPT

## 2024-10-16 PROCEDURE — 82746 ASSAY OF FOLIC ACID SERUM: CPT

## 2024-10-16 PROCEDURE — 82306 VITAMIN D 25 HYDROXY: CPT

## 2024-10-16 PROCEDURE — 82607 VITAMIN B-12: CPT

## 2024-10-16 PROCEDURE — 80061 LIPID PANEL: CPT

## 2024-10-16 PROCEDURE — 80053 COMPREHEN METABOLIC PANEL: CPT

## 2024-10-16 PROCEDURE — 36415 COLL VENOUS BLD VENIPUNCTURE: CPT

## 2024-10-16 PROCEDURE — 83036 HEMOGLOBIN GLYCOSYLATED A1C: CPT

## 2024-10-17 ENCOUNTER — SOCIAL WORK (OUTPATIENT)
Facility: HOSPITAL | Age: 34
End: 2024-10-17

## 2024-10-17 NOTE — PROGRESS NOTES
Pt is a  year old female with history of GAVI.Pss was referred to pt by [facility].Pt was crying,upset, and hungry.Pt stated she is basically homeless and nobody helps her.Pt stated her mother had recently passed and she was scared of relapsing.Although pt seemed to be very fidgety and dramatic.Pss made appt with [facility] Nov 12th 230PM for medical treatment.Pt will also need an ID made before the appt.Pss gave her card to pt.Will follow up if needed or requested    PSS Sahra Wagner

## 2024-10-21 ENCOUNTER — SOCIAL WORK (OUTPATIENT)
Facility: HOSPITAL | Age: 34
End: 2024-10-21

## 2024-10-21 NOTE — PROGRESS NOTES
Pt is a  year old female with history of GAVI.Pss had scheduled to transport pt to [facility] to get pt another ID made before her appt at [facility].Pss msg pt and made several calls to her with no response.Will follow up if needed or requested.    PSS Sahra Wagner

## 2024-10-25 DIAGNOSIS — L02.211 CUTANEOUS ABSCESS OF ABDOMINAL WALL: ICD-10-CM

## 2024-10-25 RX ORDER — SILVER SULFADIAZINE 10 MG/G
CREAM TOPICAL
Qty: 50 G | Refills: 0 | Status: SHIPPED | OUTPATIENT
Start: 2024-10-25

## 2025-01-01 ENCOUNTER — HOSPITAL ENCOUNTER (EMERGENCY)
Facility: HOSPITAL | Age: 35
End: 2025-06-05
Attending: STUDENT IN AN ORGANIZED HEALTH CARE EDUCATION/TRAINING PROGRAM
Payer: MEDICAID

## 2025-01-01 VITALS — TEMPERATURE: 92.6 F

## 2025-01-01 DIAGNOSIS — I46.9 CARDIAC ARREST: Primary | ICD-10-CM

## 2025-01-01 LAB
AMPHET+METHAMPHET UR QL: NEGATIVE
AMPHETAMINES UR QL: POSITIVE
BARBITURATES UR QL SCN: NEGATIVE
BENZODIAZ UR QL SCN: NEGATIVE
BUPRENORPHINE SERPL-MCNC: POSITIVE NG/ML
CANNABINOIDS SERPL QL: POSITIVE
COCAINE UR QL: NEGATIVE
FENTANYL UR-MCNC: NEGATIVE NG/ML
METHADONE UR QL SCN: NEGATIVE
OPIATES UR QL: NEGATIVE
OXYCODONE UR QL SCN: NEGATIVE
PCP UR QL SCN: NEGATIVE
TRICYCLICS UR QL SCN: NEGATIVE

## 2025-01-01 PROCEDURE — 94799 UNLISTED PULMONARY SVC/PX: CPT

## 2025-01-01 PROCEDURE — 99285 EMERGENCY DEPT VISIT HI MDM: CPT | Performed by: STUDENT IN AN ORGANIZED HEALTH CARE EDUCATION/TRAINING PROGRAM

## 2025-01-01 PROCEDURE — 80307 DRUG TEST PRSMV CHEM ANLYZR: CPT | Performed by: STUDENT IN AN ORGANIZED HEALTH CARE EDUCATION/TRAINING PROGRAM

## 2025-01-01 PROCEDURE — 93005 ELECTROCARDIOGRAM TRACING: CPT | Performed by: STUDENT IN AN ORGANIZED HEALTH CARE EDUCATION/TRAINING PROGRAM

## 2025-01-01 PROCEDURE — 92950 HEART/LUNG RESUSCITATION CPR: CPT

## 2025-01-15 RX ORDER — FOLIC ACID 1 MG/1
1 TABLET ORAL DAILY
Qty: 30 TABLET | Refills: 3 | Status: SHIPPED | OUTPATIENT
Start: 2025-01-15

## 2025-02-02 ENCOUNTER — APPOINTMENT (OUTPATIENT)
Dept: CT IMAGING | Facility: HOSPITAL | Age: 35
End: 2025-02-02
Payer: MEDICAID

## 2025-02-02 ENCOUNTER — HOSPITAL ENCOUNTER (INPATIENT)
Facility: HOSPITAL | Age: 35
LOS: 5 days | Discharge: HOME | End: 2025-02-07
Attending: STUDENT IN AN ORGANIZED HEALTH CARE EDUCATION/TRAINING PROGRAM | Admitting: STUDENT IN AN ORGANIZED HEALTH CARE EDUCATION/TRAINING PROGRAM
Payer: MEDICAID

## 2025-02-02 DIAGNOSIS — Z60.9 SOCIAL PROBLEM: ICD-10-CM

## 2025-02-02 DIAGNOSIS — L88 PYODERMA GANGRENOSUM: ICD-10-CM

## 2025-02-02 DIAGNOSIS — K50.919 CROHN'S DISEASE WITH COMPLICATION, UNSPECIFIED GASTROINTESTINAL TRACT LOCATION: Primary | ICD-10-CM

## 2025-02-02 PROBLEM — K50.10 CROHN'S COLITIS: Status: ACTIVE | Noted: 2025-02-02

## 2025-02-02 LAB
ABO GROUP BLD: NORMAL
ABO GROUP BLD: NORMAL
ALBUMIN SERPL-MCNC: 3 G/DL (ref 3.5–5.2)
ALBUMIN/GLOB SERPL: 0.7 G/DL
ALP SERPL-CCNC: 98 U/L (ref 39–117)
ALT SERPL W P-5'-P-CCNC: 7 U/L (ref 1–33)
ANION GAP SERPL CALCULATED.3IONS-SCNC: 11.5 MMOL/L (ref 5–15)
ANION GAP SERPL CALCULATED.3IONS-SCNC: 12.5 MMOL/L (ref 5–15)
AST SERPL-CCNC: 19 U/L (ref 1–32)
BASOPHILS # BLD AUTO: 0.04 10*3/MM3 (ref 0–0.2)
BASOPHILS # BLD AUTO: 0.07 10*3/MM3 (ref 0–0.2)
BASOPHILS NFR BLD AUTO: 0.4 % (ref 0–1.5)
BASOPHILS NFR BLD AUTO: 0.7 % (ref 0–1.5)
BILIRUB SERPL-MCNC: 0.2 MG/DL (ref 0–1.2)
BLD GP AB SCN SERPL QL: NEGATIVE
BUN SERPL-MCNC: 7 MG/DL (ref 6–20)
BUN SERPL-MCNC: 9 MG/DL (ref 6–20)
BUN/CREAT SERPL: 13.5 (ref 7–25)
BUN/CREAT SERPL: 13.6 (ref 7–25)
CALCIUM SPEC-SCNC: 7.9 MG/DL (ref 8.6–10.5)
CALCIUM SPEC-SCNC: 8.8 MG/DL (ref 8.6–10.5)
CHLORIDE SERPL-SCNC: 100 MMOL/L (ref 98–107)
CHLORIDE SERPL-SCNC: 102 MMOL/L (ref 98–107)
CO2 SERPL-SCNC: 19.5 MMOL/L (ref 22–29)
CO2 SERPL-SCNC: 22.5 MMOL/L (ref 22–29)
CREAT SERPL-MCNC: 0.52 MG/DL (ref 0.57–1)
CREAT SERPL-MCNC: 0.66 MG/DL (ref 0.57–1)
CRP SERPL-MCNC: 5.08 MG/DL (ref 0–0.5)
D-LACTATE SERPL-SCNC: 1.5 MMOL/L (ref 0.5–2)
DEPRECATED RDW RBC AUTO: 40.2 FL (ref 37–54)
DEPRECATED RDW RBC AUTO: 40.9 FL (ref 37–54)
EGFRCR SERPLBLD CKD-EPI 2021: 119.7 ML/MIN/1.73
EGFRCR SERPLBLD CKD-EPI 2021: 126.8 ML/MIN/1.73
EOSINOPHIL # BLD AUTO: 0.2 10*3/MM3 (ref 0–0.4)
EOSINOPHIL # BLD AUTO: 0.32 10*3/MM3 (ref 0–0.4)
EOSINOPHIL NFR BLD AUTO: 2 % (ref 0.3–6.2)
EOSINOPHIL NFR BLD AUTO: 3.3 % (ref 0.3–6.2)
ERYTHROCYTE [DISTWIDTH] IN BLOOD BY AUTOMATED COUNT: 14.4 % (ref 12.3–15.4)
ERYTHROCYTE [DISTWIDTH] IN BLOOD BY AUTOMATED COUNT: 14.5 % (ref 12.3–15.4)
FLUAV RNA RESP QL NAA+PROBE: NOT DETECTED
FLUBV RNA RESP QL NAA+PROBE: NOT DETECTED
GLOBULIN UR ELPH-MCNC: 4.3 GM/DL
GLUCOSE SERPL-MCNC: 79 MG/DL (ref 65–99)
GLUCOSE SERPL-MCNC: 95 MG/DL (ref 65–99)
HCG SERPL QL: NEGATIVE
HCT VFR BLD AUTO: 23.6 % (ref 34–46.6)
HCT VFR BLD AUTO: 25.8 % (ref 34–46.6)
HGB BLD-MCNC: 7.2 G/DL (ref 12–15.9)
HGB BLD-MCNC: 8 G/DL (ref 12–15.9)
IMM GRANULOCYTES # BLD AUTO: 0.04 10*3/MM3 (ref 0–0.05)
IMM GRANULOCYTES # BLD AUTO: 0.04 10*3/MM3 (ref 0–0.05)
IMM GRANULOCYTES NFR BLD AUTO: 0.4 % (ref 0–0.5)
IMM GRANULOCYTES NFR BLD AUTO: 0.4 % (ref 0–0.5)
LIPASE SERPL-CCNC: 9 U/L (ref 13–60)
LYMPHOCYTES # BLD AUTO: 1.49 10*3/MM3 (ref 0.7–3.1)
LYMPHOCYTES # BLD AUTO: 2.55 10*3/MM3 (ref 0.7–3.1)
LYMPHOCYTES NFR BLD AUTO: 15.2 % (ref 19.6–45.3)
LYMPHOCYTES NFR BLD AUTO: 26.5 % (ref 19.6–45.3)
MAGNESIUM SERPL-MCNC: 2 MG/DL (ref 1.6–2.6)
MCH RBC QN AUTO: 23.7 PG (ref 26.6–33)
MCH RBC QN AUTO: 24 PG (ref 26.6–33)
MCHC RBC AUTO-ENTMCNC: 30.5 G/DL (ref 31.5–35.7)
MCHC RBC AUTO-ENTMCNC: 31 G/DL (ref 31.5–35.7)
MCV RBC AUTO: 77.2 FL (ref 79–97)
MCV RBC AUTO: 77.6 FL (ref 79–97)
MONOCYTES # BLD AUTO: 0.75 10*3/MM3 (ref 0.1–0.9)
MONOCYTES # BLD AUTO: 0.83 10*3/MM3 (ref 0.1–0.9)
MONOCYTES NFR BLD AUTO: 7.6 % (ref 5–12)
MONOCYTES NFR BLD AUTO: 8.6 % (ref 5–12)
NEUTROPHILS NFR BLD AUTO: 5.81 10*3/MM3 (ref 1.7–7)
NEUTROPHILS NFR BLD AUTO: 60.5 % (ref 42.7–76)
NEUTROPHILS NFR BLD AUTO: 7.31 10*3/MM3 (ref 1.7–7)
NEUTROPHILS NFR BLD AUTO: 74.4 % (ref 42.7–76)
NRBC BLD AUTO-RTO: 0 /100 WBC (ref 0–0.2)
NRBC BLD AUTO-RTO: 0 /100 WBC (ref 0–0.2)
PLATELET # BLD AUTO: 499 10*3/MM3 (ref 140–450)
PLATELET # BLD AUTO: 526 10*3/MM3 (ref 140–450)
PMV BLD AUTO: 8.1 FL (ref 6–12)
PMV BLD AUTO: 8.2 FL (ref 6–12)
POTASSIUM SERPL-SCNC: 3.2 MMOL/L (ref 3.5–5.2)
POTASSIUM SERPL-SCNC: 3.5 MMOL/L (ref 3.5–5.2)
PROT SERPL-MCNC: 7.3 G/DL (ref 6–8.5)
RBC # BLD AUTO: 3.04 10*6/MM3 (ref 3.77–5.28)
RBC # BLD AUTO: 3.34 10*6/MM3 (ref 3.77–5.28)
RH BLD: POSITIVE
RH BLD: POSITIVE
SARS-COV-2 RNA RESP QL NAA+PROBE: NOT DETECTED
SODIUM SERPL-SCNC: 133 MMOL/L (ref 136–145)
SODIUM SERPL-SCNC: 135 MMOL/L (ref 136–145)
T&S EXPIRATION DATE: NORMAL
TROPONIN T SERPL HS-MCNC: <6 NG/L
WBC NRBC COR # BLD AUTO: 9.62 10*3/MM3 (ref 3.4–10.8)
WBC NRBC COR # BLD AUTO: 9.83 10*3/MM3 (ref 3.4–10.8)

## 2025-02-02 PROCEDURE — 99222 1ST HOSP IP/OBS MODERATE 55: CPT | Performed by: STUDENT IN AN ORGANIZED HEALTH CARE EDUCATION/TRAINING PROGRAM

## 2025-02-02 PROCEDURE — 80307 DRUG TEST PRSMV CHEM ANLYZR: CPT | Performed by: STUDENT IN AN ORGANIZED HEALTH CARE EDUCATION/TRAINING PROGRAM

## 2025-02-02 PROCEDURE — 63710000001 PREDNISONE PER 5 MG: Performed by: STUDENT IN AN ORGANIZED HEALTH CARE EDUCATION/TRAINING PROGRAM

## 2025-02-02 PROCEDURE — 85025 COMPLETE CBC W/AUTO DIFF WBC: CPT | Performed by: STUDENT IN AN ORGANIZED HEALTH CARE EDUCATION/TRAINING PROGRAM

## 2025-02-02 PROCEDURE — 86850 RBC ANTIBODY SCREEN: CPT | Performed by: STUDENT IN AN ORGANIZED HEALTH CARE EDUCATION/TRAINING PROGRAM

## 2025-02-02 PROCEDURE — 25810000003 SODIUM CHLORIDE 0.9 % SOLUTION: Performed by: STUDENT IN AN ORGANIZED HEALTH CARE EDUCATION/TRAINING PROGRAM

## 2025-02-02 PROCEDURE — 81001 URINALYSIS AUTO W/SCOPE: CPT | Performed by: STUDENT IN AN ORGANIZED HEALTH CARE EDUCATION/TRAINING PROGRAM

## 2025-02-02 PROCEDURE — 83735 ASSAY OF MAGNESIUM: CPT | Performed by: STUDENT IN AN ORGANIZED HEALTH CARE EDUCATION/TRAINING PROGRAM

## 2025-02-02 PROCEDURE — 80053 COMPREHEN METABOLIC PANEL: CPT | Performed by: STUDENT IN AN ORGANIZED HEALTH CARE EDUCATION/TRAINING PROGRAM

## 2025-02-02 PROCEDURE — 81025 URINE PREGNANCY TEST: CPT | Performed by: STUDENT IN AN ORGANIZED HEALTH CARE EDUCATION/TRAINING PROGRAM

## 2025-02-02 PROCEDURE — 99285 EMERGENCY DEPT VISIT HI MDM: CPT | Performed by: STUDENT IN AN ORGANIZED HEALTH CARE EDUCATION/TRAINING PROGRAM

## 2025-02-02 PROCEDURE — 86900 BLOOD TYPING SEROLOGIC ABO: CPT

## 2025-02-02 PROCEDURE — 86900 BLOOD TYPING SEROLOGIC ABO: CPT | Performed by: STUDENT IN AN ORGANIZED HEALTH CARE EDUCATION/TRAINING PROGRAM

## 2025-02-02 PROCEDURE — 86920 COMPATIBILITY TEST SPIN: CPT

## 2025-02-02 PROCEDURE — 86901 BLOOD TYPING SEROLOGIC RH(D): CPT

## 2025-02-02 PROCEDURE — 25510000001 IOPAMIDOL 61 % SOLUTION: Performed by: STUDENT IN AN ORGANIZED HEALTH CARE EDUCATION/TRAINING PROGRAM

## 2025-02-02 PROCEDURE — 83690 ASSAY OF LIPASE: CPT | Performed by: STUDENT IN AN ORGANIZED HEALTH CARE EDUCATION/TRAINING PROGRAM

## 2025-02-02 PROCEDURE — 83605 ASSAY OF LACTIC ACID: CPT | Performed by: STUDENT IN AN ORGANIZED HEALTH CARE EDUCATION/TRAINING PROGRAM

## 2025-02-02 PROCEDURE — 74177 CT ABD & PELVIS W/CONTRAST: CPT

## 2025-02-02 PROCEDURE — 86901 BLOOD TYPING SEROLOGIC RH(D): CPT | Performed by: STUDENT IN AN ORGANIZED HEALTH CARE EDUCATION/TRAINING PROGRAM

## 2025-02-02 PROCEDURE — 84703 CHORIONIC GONADOTROPIN ASSAY: CPT | Performed by: STUDENT IN AN ORGANIZED HEALTH CARE EDUCATION/TRAINING PROGRAM

## 2025-02-02 PROCEDURE — 36415 COLL VENOUS BLD VENIPUNCTURE: CPT

## 2025-02-02 PROCEDURE — 87636 SARSCOV2 & INF A&B AMP PRB: CPT | Performed by: STUDENT IN AN ORGANIZED HEALTH CARE EDUCATION/TRAINING PROGRAM

## 2025-02-02 PROCEDURE — 84484 ASSAY OF TROPONIN QUANT: CPT | Performed by: STUDENT IN AN ORGANIZED HEALTH CARE EDUCATION/TRAINING PROGRAM

## 2025-02-02 PROCEDURE — 86140 C-REACTIVE PROTEIN: CPT | Performed by: STUDENT IN AN ORGANIZED HEALTH CARE EDUCATION/TRAINING PROGRAM

## 2025-02-02 RX ORDER — BUSPIRONE HYDROCHLORIDE 15 MG/1
15 TABLET ORAL 3 TIMES DAILY
COMMUNITY
End: 2025-02-07 | Stop reason: HOSPADM

## 2025-02-02 RX ORDER — SODIUM CHLORIDE 9 MG/ML
75 INJECTION, SOLUTION INTRAVENOUS CONTINUOUS
Status: DISCONTINUED | OUTPATIENT
Start: 2025-02-02 | End: 2025-02-04

## 2025-02-02 RX ORDER — METHYLPREDNISOLONE SODIUM SUCCINATE 125 MG/2ML
60 INJECTION, POWDER, LYOPHILIZED, FOR SOLUTION INTRAMUSCULAR; INTRAVENOUS DAILY
Status: DISCONTINUED | OUTPATIENT
Start: 2025-02-03 | End: 2025-02-07 | Stop reason: HOSPADM

## 2025-02-02 RX ORDER — NITROGLYCERIN 0.4 MG/1
0.4 TABLET SUBLINGUAL
Status: DISCONTINUED | OUTPATIENT
Start: 2025-02-02 | End: 2025-02-07 | Stop reason: HOSPADM

## 2025-02-02 RX ORDER — ACETAMINOPHEN 325 MG/1
650 TABLET ORAL ONCE
Status: DISCONTINUED | OUTPATIENT
Start: 2025-02-02 | End: 2025-02-07 | Stop reason: HOSPADM

## 2025-02-02 RX ORDER — SODIUM CHLORIDE 0.9 % (FLUSH) 0.9 %
10 SYRINGE (ML) INJECTION AS NEEDED
Status: DISCONTINUED | OUTPATIENT
Start: 2025-02-02 | End: 2025-02-07 | Stop reason: HOSPADM

## 2025-02-02 RX ORDER — IOPAMIDOL 612 MG/ML
100 INJECTION, SOLUTION INTRAVASCULAR
Status: COMPLETED | OUTPATIENT
Start: 2025-02-02 | End: 2025-02-02

## 2025-02-02 RX ORDER — SODIUM CHLORIDE 0.9 % (FLUSH) 0.9 %
10 SYRINGE (ML) INJECTION EVERY 12 HOURS SCHEDULED
Status: DISCONTINUED | OUTPATIENT
Start: 2025-02-02 | End: 2025-02-07 | Stop reason: HOSPADM

## 2025-02-02 RX ORDER — HYDROCORTISONE 25 MG/G
1 CREAM TOPICAL EVERY 12 HOURS SCHEDULED
Status: DISCONTINUED | OUTPATIENT
Start: 2025-02-02 | End: 2025-02-07 | Stop reason: HOSPADM

## 2025-02-02 RX ORDER — ACETAMINOPHEN 325 MG/1
650 TABLET ORAL EVERY 6 HOURS PRN
Status: DISCONTINUED | OUTPATIENT
Start: 2025-02-02 | End: 2025-02-07 | Stop reason: HOSPADM

## 2025-02-02 RX ORDER — SODIUM CHLORIDE 9 MG/ML
40 INJECTION, SOLUTION INTRAVENOUS AS NEEDED
Status: DISCONTINUED | OUTPATIENT
Start: 2025-02-02 | End: 2025-02-07 | Stop reason: HOSPADM

## 2025-02-02 RX ADMIN — IOPAMIDOL 100 ML: 612 INJECTION, SOLUTION INTRAVENOUS at 18:21

## 2025-02-02 RX ADMIN — PREDNISONE 60 MG: 10 TABLET ORAL at 19:43

## 2025-02-02 RX ADMIN — SODIUM CHLORIDE 75 ML/HR: 9 INJECTION, SOLUTION INTRAVENOUS at 19:59

## 2025-02-02 RX ADMIN — ACETAMINOPHEN 650 MG: 325 TABLET, FILM COATED ORAL at 23:30

## 2025-02-02 RX ADMIN — SODIUM CHLORIDE 1000 ML: 9 INJECTION, SOLUTION INTRAVENOUS at 16:10

## 2025-02-02 SDOH — SOCIAL STABILITY - SOCIAL INSECURITY: PROBLEM RELATED TO SOCIAL ENVIRONMENT, UNSPECIFIED: Z60.9

## 2025-02-02 NOTE — ED PROVIDER NOTES
Subjective:  History of Present Illness:    Patient is a 34-year-old female with history of Crohn's disease, fibromyalgia, polysubstance abuse who presents today with diffuse abdominal pain.  Reports increasing abdominal pain over the last 3 to 4 days.  Reports that she is concerned that she may have a Crohn's flare.  Endorsing chills but denies measured fevers.  No chest pain or shortness of breath.  Denies any dysuria.  Does state that she has had increase in diarrhea Endorses that she has been having hematochezia for several weeks.  Also reporting increasing skin wounds.  Does endorse continued drug use.      Nurses Notes reviewed and agree, including vitals, allergies, social history and prior medical history.     REVIEW OF SYSTEMS: All systems reviewed and not pertinent unless noted.  Review of Systems   Constitutional:  Positive for activity change, appetite change and chills. Negative for fatigue and fever.   HENT:  Negative for rhinorrhea, sinus pressure and sinus pain.    Eyes:  Negative for discharge and itching.   Respiratory:  Negative for cough and shortness of breath.    Cardiovascular:  Negative for chest pain and leg swelling.   Gastrointestinal:  Positive for abdominal pain. Negative for abdominal distention, blood in stool, diarrhea, nausea and vomiting.   Endocrine: Negative for cold intolerance and heat intolerance.   Genitourinary:  Negative for decreased urine volume, difficulty urinating, flank pain, frequency, urgency, vaginal bleeding, vaginal discharge and vaginal pain.   Musculoskeletal:  Negative for gait problem, neck pain and neck stiffness.   Skin:  Positive for wound. Negative for color change.   Allergic/Immunologic: Negative for environmental allergies.   Neurological:  Negative for seizures, syncope, facial asymmetry and speech difficulty.   Psychiatric/Behavioral:  Negative for self-injury and suicidal ideas.        Past Medical History:   Diagnosis Date    Anemia     Anxiety      "Arthritis     Autoimmune disease     chrohns related.     Crohn's disease     Fibromyalgia     Substance abuse        Allergies:    Codeine, Ibuprofen, Naproxen, Ondansetron hcl, Tramadol, Acetaminophen-codeine, and Adalimumab      Past Surgical History:   Procedure Laterality Date    COLONOSCOPY      URACHUS EXCISION      urachus rupture as a baby         Social History     Socioeconomic History    Marital status: Single   Tobacco Use    Smoking status: Former     Current packs/day: 0.50     Types: Cigarettes     Passive exposure: Past    Smokeless tobacco: Never   Vaping Use    Vaping status: Every Day    Substances: Nicotine, Flavoring    Devices: Refillable tank    Passive vaping exposure: Yes   Substance and Sexual Activity    Alcohol use: Yes     Comment: occasionally    Drug use: Yes     Types: Heroin, Marijuana, Methamphetamines     Comment: medical marijuana card per pt    Sexual activity: Yes     Partners: Male     Birth control/protection: Nexplanon         Family History   Problem Relation Age of Onset    Coronary artery disease Mother     Heart attack Mother     Hypertension Mother     Hypertension Brother        Objective  Physical Exam:  /63 (BP Location: Left arm, Patient Position: Lying)   Pulse 117   Temp 98.8 °F (37.1 °C) (Oral)   Resp 18   Ht 152.4 cm (60\")   Wt 65.8 kg (145 lb)   LMP  (LMP Unknown)   SpO2 99%   BMI 28.32 kg/m²      Physical Exam  Constitutional:       General: She is not in acute distress.     Appearance: Normal appearance. She is obese. She is not ill-appearing.   HENT:      Head: Normocephalic and atraumatic.      Nose: Nose normal. No congestion or rhinorrhea.      Mouth/Throat:      Mouth: Mucous membranes are dry.      Pharynx: Oropharynx is clear. No oropharyngeal exudate or posterior oropharyngeal erythema.   Eyes:      Extraocular Movements: Extraocular movements intact.      Conjunctiva/sclera: Conjunctivae normal.      Pupils: Pupils are equal, round, and " reactive to light.   Cardiovascular:      Rate and Rhythm: Normal rate and regular rhythm.      Pulses: Normal pulses.      Heart sounds: Normal heart sounds.   Pulmonary:      Effort: Pulmonary effort is normal. No respiratory distress.      Breath sounds: Normal breath sounds.   Abdominal:      General: Abdomen is flat. Bowel sounds are normal. There is no distension.      Palpations: Abdomen is soft.      Tenderness: There is abdominal tenderness. There is no guarding or rebound.      Comments: Endorsing diffuse tenderness on exam without any reaction to my abdominal exam   Musculoskeletal:         General: No swelling or tenderness. Normal range of motion.      Cervical back: Normal range of motion and neck supple.   Skin:     General: Skin is warm and dry.      Capillary Refill: Capillary refill takes less than 2 seconds.      Findings: Lesion present.      Comments: Diffuse lesions throughout the skin that appear consistent with self-mutilation   Neurological:      General: No focal deficit present.      Mental Status: She is alert and oriented to person, place, and time. Mental status is at baseline.      Cranial Nerves: No cranial nerve deficit.      Sensory: No sensory deficit.      Motor: No weakness.      Coordination: Coordination normal.   Psychiatric:         Mood and Affect: Mood normal.         Behavior: Behavior normal.         Thought Content: Thought content normal.         Judgment: Judgment normal.         Procedures    ED Course:    ED Course as of 02/02/25 2213   Sun Feb 02, 2025   1610 Multiple attempts at IV access per nursing staff which were unsuccessful.  Went to bedside and was able to place a 20-gauge IV in the patient's left bicep after obtaining informed consent.  Blood was drawn and able to flush with continued blood return.  Patient tolerated procedure well with no immediate complication. [JE]      ED Course User Index  [JE] Jared Porras MD       Lab Results (last 24 hours)        Procedure Component Value Units Date/Time    COVID-19 and FLU A/B PCR, 1 HR TAT - Swab, Nasopharynx [[id number]]  (Normal) Collected: 02/02/25 1509    Specimen: Swab from Nasopharynx Updated: 02/02/25 1532     COVID19 Not Detected     Influenza A PCR Not Detected     Influenza B PCR Not Detected    Narrative:      Fact sheet for providers: https://www.fda.gov/media/988473/download    Fact sheet for patients: https://www.fda.gov/media/908012/download    Test performed by PCR.    CBC & Differential [[id number]]  (Abnormal) Collected: 02/02/25 1555    Specimen: Blood Updated: 02/02/25 1600    Narrative:      The following orders were created for panel order CBC & Differential.  Procedure                               Abnormality         Status                     ---------                               -----------         ------                     CBC Auto Differential[[id number]]        Abnormal            Final result                 Please view results for these tests on the individual orders.    Comprehensive Metabolic Panel [[id number]]  (Abnormal) Collected: 02/02/25 1555    Specimen: Blood Updated: 02/02/25 1622     Glucose 79 mg/dL      BUN 9 mg/dL      Creatinine 0.66 mg/dL      Sodium 135 mmol/L      Potassium 3.5 mmol/L      Comment: Slight hemolysis detected by analyzer. Result may be falsely elevated.        Chloride 100 mmol/L      CO2 22.5 mmol/L      Calcium 8.8 mg/dL      Total Protein 7.3 g/dL      Albumin 3.0 g/dL      ALT (SGPT) 7 U/L      AST (SGOT) 19 U/L      Alkaline Phosphatase 98 U/L      Total Bilirubin 0.2 mg/dL      Globulin 4.3 gm/dL      A/G Ratio 0.7 g/dL      BUN/Creatinine Ratio 13.6     Anion Gap 12.5 mmol/L      eGFR 119.7 mL/min/1.73     Narrative:      GFR Categories in Chronic Kidney Disease (CKD)      GFR Category          GFR (mL/min/1.73)    Interpretation  G1                     90 or greater         Normal or high (1)  G2                      60-89                Mild  decrease (1)  G3a                   45-59                Mild to moderate decrease  G3b                   30-44                Moderate to severe decrease  G4                    15-29                Severe decrease  G5                    14 or less           Kidney failure          (1)In the absence of evidence of kidney disease, neither GFR category G1 or G2 fulfill the criteria for CKD.    eGFR calculation 2021 CKD-EPI creatinine equation, which does not include race as a factor    Lipase [[id number]]  (Abnormal) Collected: 02/02/25 1555    Specimen: Blood Updated: 02/02/25 1622     Lipase 9 U/L     High Sensitivity Troponin T [[id number]]  (Normal) Collected: 02/02/25 1555    Specimen: Blood Updated: 02/02/25 1625     HS Troponin T <6 ng/L     Narrative:      High Sensitive Troponin T Reference Range:  <14.0 ng/L- Negative Female for AMI  <22.0 ng/L- Negative Male for AMI  >=14 - Abnormal Female indicating possible myocardial injury.  >=22 - Abnormal Male indicating possible myocardial injury.   Clinicians would have to utilize clinical acumen, EKG, Troponin, and serial changes to determine if it is an Acute Myocardial Infarction or myocardial injury due to an underlying chronic condition.         C-reactive Protein [[id number]]  (Abnormal) Collected: 02/02/25 1555    Specimen: Blood Updated: 02/02/25 1622     C-Reactive Protein 5.08 mg/dL     Lactic Acid, Plasma [[id number]]  (Normal) Collected: 02/02/25 1555    Specimen: Blood Updated: 02/02/25 1620     Lactate 1.5 mmol/L     Magnesium [[id number]]  (Normal) Collected: 02/02/25 1555    Specimen: Blood Updated: 02/02/25 1622     Magnesium 2.0 mg/dL     CBC Auto Differential [[id number]]  (Abnormal) Collected: 02/02/25 1555    Specimen: Blood Updated: 02/02/25 1600     WBC 9.62 10*3/mm3      RBC 3.34 10*6/mm3      Hemoglobin 8.0 g/dL      Hematocrit 25.8 %      MCV 77.2 fL      MCH 24.0 pg      MCHC 31.0 g/dL      RDW 14.4 %      RDW-SD 40.2 fl      MPV 8.2 fL       Platelets 526 10*3/mm3      Neutrophil % 60.5 %      Lymphocyte % 26.5 %      Monocyte % 8.6 %      Eosinophil % 3.3 %      Basophil % 0.7 %      Immature Grans % 0.4 %      Neutrophils, Absolute 5.81 10*3/mm3      Lymphocytes, Absolute 2.55 10*3/mm3      Monocytes, Absolute 0.83 10*3/mm3      Eosinophils, Absolute 0.32 10*3/mm3      Basophils, Absolute 0.07 10*3/mm3      Immature Grans, Absolute 0.04 10*3/mm3      nRBC 0.0 /100 WBC     hCG, Serum, Qualitative [[id number]]  (Normal) Collected: 02/02/25 1802    Specimen: Blood Updated: 02/02/25 1820     HCG Qualitative Negative    Basic Metabolic Panel [[id number]]  (Abnormal) Collected: 02/02/25 2041    Specimen: Blood Updated: 02/02/25 2105     Glucose 95 mg/dL      BUN 7 mg/dL      Creatinine 0.52 mg/dL      Sodium 133 mmol/L      Potassium 3.2 mmol/L      Chloride 102 mmol/L      CO2 19.5 mmol/L      Calcium 7.9 mg/dL      BUN/Creatinine Ratio 13.5     Anion Gap 11.5 mmol/L      eGFR 126.8 mL/min/1.73     Narrative:      GFR Categories in Chronic Kidney Disease (CKD)      GFR Category          GFR (mL/min/1.73)    Interpretation  G1                     90 or greater         Normal or high (1)  G2                      60-89                Mild decrease (1)  G3a                   45-59                Mild to moderate decrease  G3b                   30-44                Moderate to severe decrease  G4                    15-29                Severe decrease  G5                    14 or less           Kidney failure          (1)In the absence of evidence of kidney disease, neither GFR category G1 or G2 fulfill the criteria for CKD.    eGFR calculation 2021 CKD-EPI creatinine equation, which does not include race as a factor    CBC & Differential [[id number]]  (Abnormal) Collected: 02/02/25 2041    Specimen: Blood Updated: 02/02/25 2052    Narrative:      The following orders were created for panel order CBC & Differential.  Procedure                                "Abnormality         Status                     ---------                               -----------         ------                     CBC Auto Differential[[id number]]        Abnormal            Final result                 Please view results for these tests on the individual orders.    CBC Auto Differential [[id number]]  (Abnormal) Collected: 02/02/25 2041    Specimen: Blood Updated: 02/02/25 2052     WBC 9.83 10*3/mm3      RBC 3.04 10*6/mm3      Hemoglobin 7.2 g/dL      Hematocrit 23.6 %      MCV 77.6 fL      MCH 23.7 pg      MCHC 30.5 g/dL      RDW 14.5 %      RDW-SD 40.9 fl      MPV 8.1 fL      Platelets 499 10*3/mm3      Neutrophil % 74.4 %      Lymphocyte % 15.2 %      Monocyte % 7.6 %      Eosinophil % 2.0 %      Basophil % 0.4 %      Immature Grans % 0.4 %      Neutrophils, Absolute 7.31 10*3/mm3      Lymphocytes, Absolute 1.49 10*3/mm3      Monocytes, Absolute 0.75 10*3/mm3      Eosinophils, Absolute 0.20 10*3/mm3      Basophils, Absolute 0.04 10*3/mm3      Immature Grans, Absolute 0.04 10*3/mm3      nRBC 0.0 /100 WBC              CT Abdomen Pelvis With Contrast    Result Date: 2/2/2025  FINAL REPORT TECHNIQUE: null CLINICAL HISTORY: Diffuse abdominal pain, concerns for Crohn\"s flare COMPARISON: null FINDINGS: CT abdomen and pelvis with contrast Comparison: None Findings: No consolidation or effusion. The liver enhances homogeneously. Gallbladder is unremarkable. No biliary dilatation. The portal and splenic veins appear patent. The spleen, pancreas, and adrenal glands are unremarkable. Both kidneys enhance symmetrically without hydroureteronephrosis. Circumferential wall thickening of the distal ileum along the right hemipelvis extending to the terminal ileum. Circumferential wall thickening from the cecum the proximal sigmoid colon with mild pericolonic stranding. Liquid consistency stool in the colon. Appendix is normal. No bowel obstruction, pneumoperitoneum, or pneumatosis. Nonaneurysmal aorta. No " enlarged abdominal or pelvic lymph nodes. The urinary bladder and uterus are unremarkable. No pelvic free fluid. There are no aggressive osseous lesions.     Impression: IMPRESSION: 1. Inflammatory wall thickening in the distal ileum extending to the terminal ileum, and from the cecum to the proximal sigmoid colon suggesting acute Crohn's disease given patient's history. 2. Underlying liquid consistency stool reflecting diarrhea. Authenticated and Electronically Signed by Martin John MD on 02/02/2025 07:03:13 PM        [facility]     Amount and/or Complexity of Data Reviewed  Decide to obtain previous medical records or to obtain history from someone other than the patient: yes        Initial impression of presenting illness: Abdominal pain    DDX: includes but is not limited to: Crohn's flare, gastroenteritis, appendicitis, SBO, urinary tract infection, malingering    Patient arrives stable with vitals interpreted by myself.     Pertinent features from physical exam: Clear to auscultation, tachycardic regular rhythm, no murmur, nontender to abdominal palpation.    Initial diagnostic plan: CBC, CMP, lipase, UA, CRP, lactic acid, CT abdomen pelvis    Results from initial plan were reviewed and interpreted by me revealing patient with significant drop of hemoglobin from her baseline, appears to have baseline of around 12 currently 8 today.  Also with findings consistent with Crohn's flare on CT imaging    Diagnostic information from other sources: Reviewed past medical records    Interventions / Re-evaluation: Given IV fluids for symptom control, given prednisone given concerns for Crohn's flare    Results/clinical rationale were discussed with patient at bedside    Consultations/Discussion of results with other physicians: Given my concern for possible lower GI bleed with significant anemia secondary to Crohn's flare as etiology of patient's symptoms today, have discussed with Dr. Moras, hospitalist, and admitted to his  service for gastroenterology evaluation in the morning.    Disposition plan: Admit  -----        Final diagnoses:   Crohn's disease with complication, unspecified gastrointestinal tract location          Jared Porras MD  02/02/25 [id number]

## 2025-02-03 LAB
AMPHET+METHAMPHET UR QL: POSITIVE
AMPHETAMINES UR QL: POSITIVE
ANION GAP SERPL CALCULATED.3IONS-SCNC: 12.7 MMOL/L (ref 5–15)
ANISOCYTOSIS BLD QL: NORMAL
B-HCG UR QL: NEGATIVE
BACTERIA UR QL AUTO: ABNORMAL /HPF
BARBITURATES UR QL SCN: NEGATIVE
BASOPHILS # BLD AUTO: 0.02 10*3/MM3 (ref 0–0.2)
BASOPHILS NFR BLD AUTO: 0.2 % (ref 0–1.5)
BENZODIAZ UR QL SCN: NEGATIVE
BILIRUB UR QL STRIP: NEGATIVE
BUN SERPL-MCNC: 5 MG/DL (ref 6–20)
BUN/CREAT SERPL: 13.9 (ref 7–25)
BUPRENORPHINE SERPL-MCNC: POSITIVE NG/ML
CALCIUM SPEC-SCNC: 8 MG/DL (ref 8.6–10.5)
CANNABINOIDS SERPL QL: POSITIVE
CHLORIDE SERPL-SCNC: 105 MMOL/L (ref 98–107)
CLARITY UR: CLEAR
CO2 SERPL-SCNC: 19.3 MMOL/L (ref 22–29)
COCAINE UR QL: NEGATIVE
COLOR UR: YELLOW
CREAT SERPL-MCNC: 0.36 MG/DL (ref 0.57–1)
CRP SERPL-MCNC: 4.19 MG/DL (ref 0–0.5)
DEPRECATED RDW RBC AUTO: 41.9 FL (ref 37–54)
EGFRCR SERPLBLD CKD-EPI 2021: 138.5 ML/MIN/1.73
EOSINOPHIL # BLD AUTO: 0 10*3/MM3 (ref 0–0.4)
EOSINOPHIL NFR BLD AUTO: 0 % (ref 0.3–6.2)
ERYTHROCYTE [DISTWIDTH] IN BLOOD BY AUTOMATED COUNT: 14.5 % (ref 12.3–15.4)
ERYTHROCYTE [SEDIMENTATION RATE] IN BLOOD: 53 MM/HR (ref 0–20)
FENTANYL UR-MCNC: NEGATIVE NG/ML
GLUCOSE SERPL-MCNC: 107 MG/DL (ref 65–99)
GLUCOSE UR STRIP-MCNC: NEGATIVE MG/DL
HCT VFR BLD AUTO: 23.7 % (ref 34–46.6)
HCT VFR BLD AUTO: 24.4 % (ref 34–46.6)
HCT VFR BLD AUTO: 24.5 % (ref 34–46.6)
HCT VFR BLD AUTO: 24.8 % (ref 34–46.6)
HCT VFR BLD AUTO: 24.9 % (ref 34–46.6)
HGB BLD-MCNC: 7.1 G/DL (ref 12–15.9)
HGB BLD-MCNC: 7.1 G/DL (ref 12–15.9)
HGB BLD-MCNC: 7.2 G/DL (ref 12–15.9)
HGB BLD-MCNC: 7.4 G/DL (ref 12–15.9)
HGB BLD-MCNC: 7.5 G/DL (ref 12–15.9)
HGB UR QL STRIP.AUTO: NEGATIVE
HYALINE CASTS UR QL AUTO: ABNORMAL /LPF
HYPOCHROMIA BLD QL: NORMAL
IMM GRANULOCYTES # BLD AUTO: 0.05 10*3/MM3 (ref 0–0.05)
IMM GRANULOCYTES NFR BLD AUTO: 0.4 % (ref 0–0.5)
KETONES UR QL STRIP: ABNORMAL
LEUKOCYTE ESTERASE UR QL STRIP.AUTO: ABNORMAL
LYMPHOCYTES # BLD AUTO: 0.72 10*3/MM3 (ref 0.7–3.1)
LYMPHOCYTES NFR BLD AUTO: 6.1 % (ref 19.6–45.3)
MCH RBC QN AUTO: 23.1 PG (ref 26.6–33)
MCHC RBC AUTO-ENTMCNC: 29.1 G/DL (ref 31.5–35.7)
MCV RBC AUTO: 79.2 FL (ref 79–97)
METHADONE UR QL SCN: NEGATIVE
MONOCYTES # BLD AUTO: 0.07 10*3/MM3 (ref 0.1–0.9)
MONOCYTES NFR BLD AUTO: 0.6 % (ref 5–12)
NEUTROPHILS NFR BLD AUTO: 11.02 10*3/MM3 (ref 1.7–7)
NEUTROPHILS NFR BLD AUTO: 92.7 % (ref 42.7–76)
NITRITE UR QL STRIP: NEGATIVE
NRBC BLD AUTO-RTO: 0 /100 WBC (ref 0–0.2)
OPIATES UR QL: NEGATIVE
OXYCODONE UR QL SCN: NEGATIVE
PCP UR QL SCN: NEGATIVE
PH UR STRIP.AUTO: 6 [PH] (ref 5–8)
PLATELET # BLD AUTO: 509 10*3/MM3 (ref 140–450)
PMV BLD AUTO: 8.2 FL (ref 6–12)
POTASSIUM SERPL-SCNC: 3.9 MMOL/L (ref 3.5–5.2)
PROT UR QL STRIP: NEGATIVE
RBC # BLD AUTO: 3.08 10*6/MM3 (ref 3.77–5.28)
RBC # UR STRIP: ABNORMAL /HPF
REF LAB TEST METHOD: ABNORMAL
SMALL PLATELETS BLD QL SMEAR: NORMAL
SODIUM SERPL-SCNC: 137 MMOL/L (ref 136–145)
SP GR UR STRIP: >=1.03 (ref 1–1.03)
SQUAMOUS #/AREA URNS HPF: ABNORMAL /HPF
TRICYCLICS UR QL SCN: NEGATIVE
UROBILINOGEN UR QL STRIP: ABNORMAL
WBC # UR STRIP: ABNORMAL /HPF
WBC MORPH BLD: NORMAL
WBC NRBC COR # BLD AUTO: 11.88 10*3/MM3 (ref 3.4–10.8)

## 2025-02-03 PROCEDURE — 85007 BL SMEAR W/DIFF WBC COUNT: CPT | Performed by: STUDENT IN AN ORGANIZED HEALTH CARE EDUCATION/TRAINING PROGRAM

## 2025-02-03 PROCEDURE — 99222 1ST HOSP IP/OBS MODERATE 55: CPT | Performed by: INTERNAL MEDICINE

## 2025-02-03 PROCEDURE — 25010000002 HEPARIN (PORCINE) PER 1000 UNITS: Performed by: INTERNAL MEDICINE

## 2025-02-03 PROCEDURE — 85652 RBC SED RATE AUTOMATED: CPT | Performed by: INTERNAL MEDICINE

## 2025-02-03 PROCEDURE — 85018 HEMOGLOBIN: CPT | Performed by: STUDENT IN AN ORGANIZED HEALTH CARE EDUCATION/TRAINING PROGRAM

## 2025-02-03 PROCEDURE — 25010000002 MORPHINE PER 10 MG: Performed by: INTERNAL MEDICINE

## 2025-02-03 PROCEDURE — 85014 HEMATOCRIT: CPT | Performed by: STUDENT IN AN ORGANIZED HEALTH CARE EDUCATION/TRAINING PROGRAM

## 2025-02-03 PROCEDURE — 25010000002 METHYLPREDNISOLONE PER 125 MG: Performed by: STUDENT IN AN ORGANIZED HEALTH CARE EDUCATION/TRAINING PROGRAM

## 2025-02-03 PROCEDURE — 85025 COMPLETE CBC W/AUTO DIFF WBC: CPT | Performed by: STUDENT IN AN ORGANIZED HEALTH CARE EDUCATION/TRAINING PROGRAM

## 2025-02-03 PROCEDURE — 99232 SBSQ HOSP IP/OBS MODERATE 35: CPT | Performed by: INTERNAL MEDICINE

## 2025-02-03 PROCEDURE — 80048 BASIC METABOLIC PNL TOTAL CA: CPT | Performed by: STUDENT IN AN ORGANIZED HEALTH CARE EDUCATION/TRAINING PROGRAM

## 2025-02-03 PROCEDURE — 86140 C-REACTIVE PROTEIN: CPT | Performed by: INTERNAL MEDICINE

## 2025-02-03 RX ORDER — MORPHINE SULFATE 2 MG/ML
2 INJECTION, SOLUTION INTRAMUSCULAR; INTRAVENOUS EVERY 4 HOURS PRN
Status: DISCONTINUED | OUTPATIENT
Start: 2025-02-03 | End: 2025-02-07 | Stop reason: HOSPADM

## 2025-02-03 RX ORDER — BUPROPION HYDROCHLORIDE 150 MG/1
150 TABLET, EXTENDED RELEASE ORAL 2 TIMES DAILY
Status: DISCONTINUED | OUTPATIENT
Start: 2025-02-03 | End: 2025-02-07 | Stop reason: HOSPADM

## 2025-02-03 RX ORDER — SILVER SULFADIAZINE 10 MG/G
1 CREAM TOPICAL
Status: DISCONTINUED | OUTPATIENT
Start: 2025-02-03 | End: 2025-02-07 | Stop reason: HOSPADM

## 2025-02-03 RX ORDER — BUPROPION HYDROCHLORIDE 150 MG/1
150 TABLET, EXTENDED RELEASE ORAL 2 TIMES DAILY
COMMUNITY

## 2025-02-03 RX ORDER — HEPARIN SODIUM 5000 [USP'U]/ML
5000 INJECTION, SOLUTION INTRAVENOUS; SUBCUTANEOUS EVERY 12 HOURS SCHEDULED
Status: DISCONTINUED | OUTPATIENT
Start: 2025-02-03 | End: 2025-02-07 | Stop reason: HOSPADM

## 2025-02-03 RX ORDER — FOLIC ACID 1 MG/1
1000 TABLET ORAL DAILY
Status: DISCONTINUED | OUTPATIENT
Start: 2025-02-03 | End: 2025-02-07 | Stop reason: HOSPADM

## 2025-02-03 RX ORDER — DULOXETIN HYDROCHLORIDE 30 MG/1
60 CAPSULE, DELAYED RELEASE ORAL EVERY MORNING
Status: DISCONTINUED | OUTPATIENT
Start: 2025-02-04 | End: 2025-02-07 | Stop reason: HOSPADM

## 2025-02-03 RX ORDER — PANTOPRAZOLE SODIUM 40 MG/1
40 TABLET, DELAYED RELEASE ORAL
Status: DISCONTINUED | OUTPATIENT
Start: 2025-02-04 | End: 2025-02-07 | Stop reason: HOSPADM

## 2025-02-03 RX ADMIN — Medication 10 ML: at 20:21

## 2025-02-03 RX ADMIN — HYDROCORTISONE 1 APPLICATION: 25 CREAM TOPICAL at 02:40

## 2025-02-03 RX ADMIN — SILVER SULFADIAZINE 1 APPLICATION: 10 CREAM TOPICAL at 12:51

## 2025-02-03 RX ADMIN — MORPHINE SULFATE 2 MG: 2 INJECTION, SOLUTION INTRAMUSCULAR; INTRAVENOUS at 11:35

## 2025-02-03 RX ADMIN — MORPHINE SULFATE 2 MG: 2 INJECTION, SOLUTION INTRAMUSCULAR; INTRAVENOUS at 22:49

## 2025-02-03 RX ADMIN — FOLIC ACID 1000 MCG: 1 TABLET ORAL at 17:57

## 2025-02-03 RX ADMIN — HEPARIN SODIUM 5000 UNITS: 5000 INJECTION INTRAVENOUS; SUBCUTANEOUS at 15:21

## 2025-02-03 RX ADMIN — METHYLPREDNISOLONE SODIUM SUCCINATE 60 MG: 125 INJECTION, POWDER, FOR SOLUTION INTRAMUSCULAR; INTRAVENOUS at 08:32

## 2025-02-03 RX ADMIN — Medication 10 ML: at 08:32

## 2025-02-03 RX ADMIN — HEPARIN SODIUM 5000 UNITS: 5000 INJECTION INTRAVENOUS; SUBCUTANEOUS at 20:22

## 2025-02-03 RX ADMIN — BUPROPION HYDROCHLORIDE 150 MG: 150 TABLET, FILM COATED, EXTENDED RELEASE ORAL at 20:21

## 2025-02-03 RX ADMIN — HYDROCORTISONE 1 APPLICATION: 25 CREAM TOPICAL at 20:21

## 2025-02-03 RX ADMIN — ACETAMINOPHEN 650 MG: 325 TABLET, FILM COATED ORAL at 21:25

## 2025-02-03 RX ADMIN — ACETAMINOPHEN 650 MG: 325 TABLET, FILM COATED ORAL at 15:29

## 2025-02-03 RX ADMIN — MORPHINE SULFATE 2 MG: 2 INJECTION, SOLUTION INTRAMUSCULAR; INTRAVENOUS at 17:57
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
## 2025-02-03 NOTE — PROGRESS NOTES
[facility]    Name:  Shira Toro   Age:  34 y.o.  Sex:  female  :  1990  MRN:  [id number]   Visit Number:  [id number]  Admission Date:  2025  Date Of Service:  25  Primary Care Physician:  Gin Yoo APRN     LOS: 1 day :    Chief Complaint:      Crohn's flare    Subjective:    Patient examined this morning. Resting comfortably. Still with abdominal pain. No acute events overnight.     Hospital Course:     This is a 34-year-old female with a past medical history of substance abuse, fibromyalgia, anxiety, depression, GERD, and Crohn's disease presenting with abdominal pain.   In ED, blood pressure 112/75, heart rate 131, respiratory rate 16, temperature 97.8 and O2 saturation 99% on room air. Labs on arrival showed sodium 135, potassium 3.5, BUN 9, creatinine 0.6, CRP 5.08, lactic acid 5.5, WBC 9.6, hemoglobin 8, platelet count 426. CT abdomen showed inflammatory wall thickening in the distal ileum extending to the terminal ileum and from the cecum to the proximal sigmoid colon suggesting acute Crohn's disease.     Review of Systems:     All systems were reviewed and negative except as mentioned in subjective, assessment and plan.    Vital Signs:    Temp:  [97.8 °F (36.6 °C)-98.8 °F (37.1 °C)] 98.3 °F (36.8 °C)  Heart Rate:  [] 89  Resp:  [16-18] 16  BP: (101-133)/(60-79) 109/60    Intake and output:    I/O last 3 completed shifts:  In: -   Out: 800 [Urine:800]  No intake/output data recorded.    Physical Examination:    General Appearance:  Alert, chronically ill appearing. NAD   Head:  Atraumatic and normocephalic.   Eyes: Conjunctivae and sclerae normal, no icterus. No pallor.   Throat: No oral lesions, no thrush, oral mucosa moist.   Neck: Supple, trachea midline, no thyromegaly.   Lungs:   Non-labored breathing   Heart:  Normal S1 and S2, no murmur, No JVD.   Abdomen:   Normal bowel sounds, Soft, nontender, nondistended, no rebound  "tenderness.   Extremities: Supple, no edema, no cyanosis, no clubbing.   Skin: Wound superficial, circumferential wounds on torso and all extremities. Large ulcerations to bilateral lower extremities.    Neurologic: Alert and oriented x 3.      Laboratory results:    Results from last 7 days   Lab Units 02/03/25  0553 02/02/25 2041 02/02/25  1555   SODIUM mmol/L 137 133* 135*   POTASSIUM mmol/L 3.9 3.2* 3.5   CHLORIDE mmol/L 105 102 100   CO2 mmol/L 19.3* 19.5* 22.5   BUN mg/dL 5* 7 9   CREATININE mg/dL 0.36* 0.52* 0.66   CALCIUM mg/dL 8.0* 7.9* 8.8   BILIRUBIN mg/dL  --   --  0.2   ALK PHOS U/L  --   --  98   ALT (SGPT) U/L  --   --  7   AST (SGOT) U/L  --   --  19   GLUCOSE mg/dL 107* 95 79     Results from last 7 days   Lab Units 02/03/25  1056 02/03/25  0553 02/03/25  0002 02/02/25 2041 02/02/25  1555   WBC 10*3/mm3  --  11.88*  --  9.83 9.62   HEMOGLOBIN g/dL 7.5* 7.1*  7.2* 7.4* 7.2* 8.0*   HEMATOCRIT % 24.9* 24.4*  24.5* 24.8* 23.6* 25.8*   PLATELETS 10*3/mm3  --  509*  --  499* 526*         Results from last 7 days   Lab Units 02/02/25  1555   HSTROP T ng/L <6         No results for input(s): \"PHART\", \"MDK0WWK\", \"PO2ART\", \"WRK5YGO\", \"BASEEXCESS\" in the last 8760 hours.   I have reviewed the patient's laboratory results.    Radiology results:    CT Abdomen Pelvis With Contrast    Result Date: 2/2/2025  FINAL REPORT TECHNIQUE: null CLINICAL HISTORY: Diffuse abdominal pain, concerns for Crohn\"s flare COMPARISON: null FINDINGS: CT abdomen and pelvis with contrast Comparison: None Findings: No consolidation or effusion. The liver enhances homogeneously. Gallbladder is unremarkable. No biliary dilatation. The portal and splenic veins appear patent. The spleen, pancreas, and adrenal glands are unremarkable. Both kidneys enhance symmetrically without hydroureteronephrosis. Circumferential wall thickening of the distal ileum along the right hemipelvis extending to the terminal ileum. Circumferential wall " thickening from the cecum the proximal sigmoid colon with mild pericolonic stranding. Liquid consistency stool in the colon. Appendix is normal. No bowel obstruction, pneumoperitoneum, or pneumatosis. Nonaneurysmal aorta. No enlarged abdominal or pelvic lymph nodes. The urinary bladder and uterus are unremarkable. No pelvic free fluid. There are no aggressive osseous lesions.     Impression: IMPRESSION: 1. Inflammatory wall thickening in the distal ileum extending to the terminal ileum, and from the cecum to the proximal sigmoid colon suggesting acute Crohn's disease given patient's history. 2. Underlying liquid consistency stool reflecting diarrhea. Authenticated and Electronically Signed by Martin John MD on 02/02/2025 07:03:13 PM   I have reviewed the patient's radiology reports.    Medication Review:     I have reviewed the patient's active and prn medications.     Problem List:      Crohn's colitis      Assessment:    Acute Crohn's Flare  Hematochezia  Polysubstance abuse  Anxiety/Depression  GERD    Plan:    Acute Crohn's Flare  Hematochezia  GI consulted  Continue steroids  Continue IVF  GI PCR panel.  Monitor H&H; transfuse if less than 7.  Polysubstance abuse   UDS + for methamphetamine  Counseled on cessation  Consider  consult once medically stable  Multiple wounds  Present on admission  Wound care consult    Patient will ultimately need to follow up with established GI at  upon discharge and resume her Skyrizi.   Further orders as clinical course dictates.     DVT Prophylaxis: Heparin sq  Code Status: Full  Diet: As tolerated  Discharge Plan: Pending    Alberto Denson DO  02/03/25  13:04 EST    Dictated utilizing Dragon dictation.

## 2025-02-03 NOTE — PAYOR COMM NOTE
"TO:HUMANA   FROM:LYLE ALICEA, RN PHONE [phone]  CLINICALS REF# [id number]    Shira Weeks (34 y.o. Female)       Date of Birth   1990    Social Security Number       Address   [address] [id number]    Home Phone   [phone]    MRN   [id number]       Gnosticist   Yazidi    Marital Status   Single                            Admission Date   25    Admission Type   Emergency    Admitting Provider   Jagdish Rabago MD    Attending Provider   Alberto Denson DO    Department, Room/Bed   [facility] TELEMETRY 4, 423/1       Discharge Date       Discharge Disposition       Discharge Destination                                 Attending Provider: Alberto Denson DO    Allergies: Codeine, Ibuprofen, Naproxen, Ondansetron Hcl, Tramadol, Acetaminophen-codeine, Adalimumab    Isolation: Spore   Infection: C.difficile (rule out) (25)   Code Status: CPR    Ht: 152.4 cm (60\")   Wt: 63.9 kg (140 lb 14 oz)    Admission Cmt: None   Principal Problem: Crohn's colitis [K50.10]                   Active Insurance as of 2025       Primary Coverage       Payor Plan Insurance Group Employer/Plan Group    [place] [id number]       Payor Plan Address Payor Plan Phone Number Payor Plan Fax Number Effective Dates    [employer] [address] [address] [phone]  2020 - None Entered    [facility] [id number]         Subscriber Name Subscriber Birth Date Member ID       SHIRA WEEKS 1990 [id number]                     Emergency Contacts        (Rel.) Home Phone Work Phone Mobile Phone    masha weeks (Father) [phone] -- --                 History & Physical        Jagdish Rabago MD at 25 [facility]              [facility]    Patient Identification:  Name:  Shira Weeks  Age:  34 y.o.  Sex:  female  :  1990  MRN:  [id number]   Visit Number:  [id number]  Admit " Date: 2/2/2025   Room number:  07/07  Primary Care Physician:  Gin Yoo, APRN    Date of Admission: 2/2/2025     Subjective     Chief complaint:    Chief Complaint   Patient presents with    Abdominal Pain       History of presenting illness:    Assessment & Plan     This is a 34-year-old female with a past medical history of substance abuse, fibromyalgia, anxiety, depression, GERD, and Crohn's disease presenting with abdominal pain.  Patient states that for the last 4 days she has been experiencing worsening abdominal pain and diarrhea.  Patient is concerned that she has gone through a Crohn's flare.  She has also noticed several episodes of hematochezia in the past several weeks but none today.    In ED, blood pressure 112/75, heart rate 131, respiratory rate 16, temperature 97.8 and O2 saturation 99% on room air.  Labs on arrival showed sodium 135, potassium 3.5, BUN 9, creatinine 0.6, CRP 5.08, lactic acid 5.5, WBC 9.6, hemoglobin 8, platelet count 426.  CT abdomen showed inflammatory wall thickening in the distal ileum extending to the terminal ileum and from the cecum to the proximal sigmoid colon suggesting acute Crohn's disease.    ---------------------------------------------------------------------------------------------------------------------   Review of Systems   Constitutional:  Positive for fatigue.   HENT:  Negative for ear pain and sinus pain.    Eyes:  Negative for pain.   Respiratory:  Negative for choking.    Cardiovascular:  Negative for leg swelling.   Gastrointestinal:  Positive for abdominal pain and blood in stool. Negative for nausea.   Endocrine: Negative for polydipsia.   Genitourinary:  Negative for dysuria.   Musculoskeletal:  Negative for gait problem.   Allergic/Immunologic: Negative for food allergies.   Neurological:  Negative for headaches.   Hematological:  Does not bruise/bleed easily.      ---------------------------------------------------------------------------------------------------------------------   Past Medical History:   Diagnosis Date    Anemia     Anxiety     Arthritis     Autoimmune disease     chrohns related.     Crohn's disease     Fibromyalgia     Substance abuse      Past Surgical History:   Procedure Laterality Date    COLONOSCOPY      URACHUS EXCISION      urachus rupture as a baby     Family History   Problem Relation Age of Onset    Coronary artery disease Mother     Heart attack Mother     Hypertension Mother     Hypertension Brother      Social History     Socioeconomic History    Marital status: Single   Tobacco Use    Smoking status: Former     Current packs/day: 0.50     Types: Cigarettes     Passive exposure: Past    Smokeless tobacco: Never   Vaping Use    Vaping status: Every Day    Substances: Nicotine, Flavoring    Devices: R[employer] Upble tank    Passive vaping exposure: Yes   Substance and Sexual Activity    Alcohol use: Never    Drug use: Yes     Types: Heroin, Marijuana, Methamphetamines    Sexual activity: Yes     Partners: Male     Birth control/protection: Nexplanon     ---------------------------------------------------------------------------------------------------------------------   Allergies:  Codeine, Ibuprofen, Naproxen, Ondansetron hcl, Tramadol, Acetaminophen-codeine, and Adalimumab  ---------------------------------------------------------------------------------------------------------------------   Medications below are reported home medications pulling from within the system; at this time, these medications have not been reconciled unless otherwise specified and are in the verification process for further verifcation as current home medications.      Prior to Admission Medications       Prescriptions Last Dose Informant Patient Reported? Taking?    buPROPion XL (WELLBUTRIN XL) 150 MG 24 hr tablet   Yes No    Take 1 tablet by mouth 2 (Two) Times a  Day.    cyanocobalamin 1000 MCG/ML injection   Yes No    Inject 1 mL under the skin into the appropriate area as directed Every 30 (Thirty) Days.    DULoxetine (CYMBALTA) 60 MG capsule   Yes No    Take 1 capsule by mouth Every Morning.    Patient not taking:  Reported on 9/12/2024    folic acid (FOLVITE) 1 MG tablet   Yes No    TAKE 1 TABLET (1 MG TOTAL) BY MOUTH 1 (ONE) TIME EACH DAY.    Patient not taking:  Reported on 9/12/2024    gabapentin (NEURONTIN) 800 MG tablet   Yes No    1 tablet 3 (Three) Times a Day.    Golimumab 100 MG/ML solution auto-injector   Yes No    Inject  under the skin into the appropriate area as directed.    loperamide (IMODIUM) 2 MG capsule   Yes No    omeprazole (priLOSEC) 20 MG capsule   Yes No    Take 1 capsule by mouth Daily.    predniSONE (DELTASONE) 10 MG tablet   Yes No    Patient not taking:  Reported on 9/12/2024    promethazine (PHENERGAN) 25 MG tablet   Yes No    TAKE 1 TABLET BY MOUTH 3 TIMES DAILY AS NEEDED FOR NAUSEA    QUEtiapine (SEROquel) 50 MG tablet   Yes No    TAKE 1-2 TABLETS BY MOUTH EVERY NIGHT AS NEEDED    silver sulfadiazine (Silvadene) 1 % cream   No No    Apply 1 Application topically to the appropriate area as directed 2 (Two) Times a Day.    tiZANidine (ZANAFLEX) 4 MG tablet   Yes No          Objective     Vital Signs:  Temp:  [97.8 °F (36.6 °C)] 97.8 °F (36.6 °C)  Heart Rate:  [108-131] 111  Resp:  [16] 16  BP: (109-133)/(65-78) 119/65    Mean Arterial Pressure (Non-Invasive) for the past 24 hrs (Last 3 readings):   Noninvasive MAP (mmHg)   02/02/25 1900 91   02/02/25 1839 85   02/02/25 1700 88     SpO2:  [97 %-100 %] 98 %  on   ;   Device (Oxygen Therapy): room air  Body mass index is 28.32 kg/m².    Wt Readings from Last 3 Encounters:   02/02/25 65.8 kg (145 lb)   09/12/24 65.8 kg (145 lb)   08/06/24 65.3 kg (143 lb 15.4 oz)      ----------------------------------------------------------------------------------------------------------------------  PHYSICAL  "EXAMINATION:  GENERAL: The patient is well developed and nontoxic.  HEENT: Anicteric sclerae, PERRLA, EOMI. Oropharynx clear. Moist mucous membranes. Conjunctivae appear well perfused.  CHEST: Chest wall is nontender.  HEART: Regular rate and rhythm without murmurs.  LUNGS: Clear to auscultation bilaterally.  ABDOMEN: Soft, positive bowel sounds, nontender, no organomegaly.  RECTAL: Deferred.  SKIN: No rash, no excessive bruising, petechiae, or purpura.  NEUROLOGIC: Cranial nerves II-XII intact without motor/sensory deficit.    ---------------------------------------------------------------------------------------------------------------------  --------------------------------------------------------------------------------------------------------------------  LABS:    CBC and coagulation:  Results from last 7 days   Lab Units 02/02/25  1555   LACTATE mmol/L 1.5   CRP mg/dL 5.08*   WBC 10*3/mm3 9.62   HEMOGLOBIN g/dL 8.0*   HEMATOCRIT % 25.8*   MCV fL 77.2*   MCHC g/dL 31.0*   PLATELETS 10*3/mm3 526*     Acid/base balance:      Renal and electrolytes:  Results from last 7 days   Lab Units 02/02/25  1555   SODIUM mmol/L 135*   POTASSIUM mmol/L 3.5   MAGNESIUM mg/dL 2.0   CHLORIDE mmol/L 100   CO2 mmol/L 22.5   BUN mg/dL 9   CREATININE mg/dL 0.66   CALCIUM mg/dL 8.8   GLUCOSE mg/dL 79     Estimated Creatinine Clearance: 103.5 mL/min (by C-G formula based on SCr of 0.66 mg/dL).    Liver and pancreatic function:  Results from last 7 days   Lab Units 02/02/25  1555   ALBUMIN g/dL 3.0*   BILIRUBIN mg/dL 0.2   ALK PHOS U/L 98   AST (SGOT) U/L 19   ALT (SGPT) U/L 7   LIPASE U/L 9*     Endocrine function:  No results found for: \"HGBA1C\"  Point of care bedside glucose levels:      Lab Results   Component Value Date    TSH 0.56 06/17/2022     Cardiac:  Results from last 7 days   Lab Units 02/02/25  1555   HSTROP T ng/L <6       Cultures:  Lab Results   Component Value Date    COLORU Yellow 08/06/2024    CLARITYU Cloudy (A) " 08/06/2024    SPECGRAV 1.010 11/16/2017    PHUR 6.5 08/06/2024    GLUCOSEU Negative 08/06/2024    KETONESU 15 mg/dL (1+) (A) 08/06/2024    BLOODU Moderate (2+) (A) 08/06/2024    NITRITEU Negative 08/06/2024    LEUKOCYTESUR Trace (A) 08/06/2024    BILIRUBINUR Negative 08/06/2024    UROBILINOGEN 0.2 E.U./dL 08/06/2024    RBCUA None Seen 08/06/2024    WBCUA 0-2 08/06/2024    BACTERIA None Seen 08/06/2024     Microbiology Results (last 10 days)       Procedure Component Value - Date/Time    COVID-19 and FLU A/B PCR, 1 HR TAT - Swab, Nasopharynx [[id number]]  (Normal) Collected: 02/02/25 1509    Lab Status: Final result Specimen: Swab from Nasopharynx Updated: 02/02/25 1532     COVID19 Not Detected     Influenza A PCR Not Detected     Influenza B PCR Not Detected    Narrative:      Fact sheet for providers: https://www.fda.gov/media/887273/download    Fact sheet for patients: https://www.fda.gov/media/523270/download    Test performed by PCR.            Lab Results   Component Value Date    PREGTESTUR Negative 09/15/2022     Pain Management Panel  More data exists         Latest Ref Rng & Units 8/6/2024 8/4/2023   Pain Management Panel   Amphetamine, Urine Qual Negative Positive  Negative    Barbiturates Screen, Urine Negative Negative  Negative    Benzodiazepine Screen, Urine Negative Negative  Negative    Buprenorphine, Screen, Urine Negative Positive  Negative    Cocaine Screen, Urine Negative Positive  Negative    Fentanyl, Urine Negative Negative  -   Methadone Screen , Urine Negative Negative  Negative    Methamphetamine, Ur Negative Positive  Negative       Details                   I have personally looked at the labs and they are summarized above.  ----------------------------------------------------------------------------------------------------------------------  Detailed radiology reports for the last 24 hours:    Imaging Results (Last 24 Hours)       Procedure Component Value Units Date/Time    CT Abdomen  "Pelvis With Contrast [[id number]] Collected: 02/02/25 1903     Updated: 02/02/25 1904    Narrative:      FINAL REPORT    TECHNIQUE:  null    CLINICAL HISTORY:  Diffuse abdominal pain, concerns for Crohn\"s flare    COMPARISON:  null    FINDINGS:  CT abdomen and pelvis with contrast    Comparison: None    Findings:    No consolidation or effusion.    The liver enhances homogeneously. Gallbladder is unremarkable.    No biliary dilatation. The portal and splenic veins appear patent.    The spleen, pancreas, and adrenal glands are unremarkable.    Both kidneys enhance symmetrically without hydroureteronephrosis.    Circumferential wall thickening of the distal ileum along the right hemipelvis extending to the terminal ileum.    Circumferential wall thickening from the cecum the proximal sigmoid colon with mild pericolonic stranding.    Liquid consistency stool in the colon. Appendix is normal.    No bowel obstruction, pneumoperitoneum, or pneumatosis.    Nonaneurysmal aorta.    No enlarged abdominal or pelvic lymph nodes.    The urinary bladder and uterus are unremarkable.    No pelvic free fluid.    There are no aggressive osseous lesions.      Impression:      IMPRESSION:    1. Inflammatory wall thickening in the distal ileum extending to the terminal ileum, and from the cecum to the proximal sigmoid colon suggesting acute Crohn's disease given patient's history.    2. Underlying liquid consistency stool reflecting diarrhea.    Authenticated and Electronically Signed by Martin John MD on  02/02/2025 07:03:13 PM          Final impressions for the last 30 days of radiology reports:    CT Abdomen Pelvis With Contrast    Result Date: 2/2/2025  IMPRESSION: 1. Inflammatory wall thickening in the distal ileum extending to the terminal ileum, and from the cecum to the proximal sigmoid colon suggesting acute Crohn's disease given patient's history. 2. Underlying liquid consistency stool reflecting diarrhea. Authenticated and " Electronically Signed by Martin John MD on 02/02/2025 07:03:13 PM       Assessment & Plan     This is a 34-year-old female with a past medical history of substance abuse, fibromyalgia, anxiety, depression, GERD, and Crohn's disease presenting with abdominal pain.     Assessment:  Acute Crohn's flare  Hematochezia  Substance abuse  Fibromyalgia  Anxiety  Depression  GERD    Plan:  - Admit to inpatient  - Start steroids  - Tylenol as needed  - Will hold off on starting any antibiotics at this time  - Start IV fluids  - Clear liquids  - N.p.o. after midnight  - Consult GI  - Monitor H/H  - Type and screen  - Monitor on TELE    Jagdish Rabago MD  [facility]  02/02/25  19:38 EST      Electronically signed by Jagdish Rabago MD at 02/02/25 1944          Emergency Department Notes        EdgeJared MD at 02/02/25 1423          Subjective:  History of Present Illness:    Patient is a 34-year-old female with history of Crohn's disease, fibromyalgia, polysubstance abuse who presents today with diffuse abdominal pain.  Reports increasing abdominal pain over the last 3 to 4 days.  Reports that she is concerned that she may have a Crohn's flare.  Endorsing chills but denies measured fevers.  No chest pain or shortness of breath.  Denies any dysuria.  Does state that she has had increase in diarrhea Endorses that she has been having hematochezia for several weeks.  Also reporting increasing skin wounds.  Does endorse continued drug use.      Nurses Notes reviewed and agree, including vitals, allergies, social history and prior medical history.     REVIEW OF SYSTEMS: All systems reviewed and not pertinent unless noted.  Review of Systems   Constitutional:  Positive for activity change, appetite change and chills. Negative for fatigue and fever.   HENT:  Negative for rhinorrhea, sinus pressure and sinus pain.    Eyes:  Negative for discharge and itching.   Respiratory:  Negative for cough and shortness of  breath.    Cardiovascular:  Negative for chest pain and leg swelling.   Gastrointestinal:  Positive for abdominal pain. Negative for abdominal distention, blood in stool, diarrhea, nausea and vomiting.   Endocrine: Negative for cold intolerance and heat intolerance.   Genitourinary:  Negative for decreased urine volume, difficulty urinating, flank pain, frequency, urgency, vaginal bleeding, vaginal discharge and vaginal pain.   Musculoskeletal:  Negative for gait problem, neck pain and neck stiffness.   Skin:  Positive for wound. Negative for color change.   Allergic/Immunologic: Negative for environmental allergies.   Neurological:  Negative for seizures, syncope, facial asymmetry and speech difficulty.   Psychiatric/Behavioral:  Negative for self-injury and suicidal ideas.        Past Medical History:   Diagnosis Date    Anemia     Anxiety     Arthritis     Autoimmune disease     chrohns related.     Crohn's disease     Fibromyalgia     Substance abuse        Allergies:    Codeine, Ibuprofen, Naproxen, Ondansetron hcl, Tramadol, Acetaminophen-codeine, and Adalimumab      Past Surgical History:   Procedure Laterality Date    COLONOSCOPY      URACHUS EXCISION      urachus rupture as a baby         Social History     Socioeconomic History    Marital status: Single   Tobacco Use    Smoking status: Former     Current packs/day: 0.50     Types: Cigarettes     Passive exposure: Past    Smokeless tobacco: Never   Vaping Use    Vaping status: Every Day    Substances: Nicotine, Flavoring    Devices: Refillable tank    Passive vaping exposure: Yes   Substance and Sexual Activity    Alcohol use: Yes     Comment: occasionally    Drug use: Yes     Types: Heroin, Marijuana, Methamphetamines     Comment: medical marijuana card per pt    Sexual activity: Yes     Partners: Male     Birth control/protection: Nexplanon         Family History   Problem Relation Age of Onset    Coronary artery disease Mother     Heart attack Mother      "Hypertension Mother     Hypertension Brother        Objective  Physical Exam:  /63 (BP Location: Left arm, Patient Position: Lying)   Pulse 117   Temp 98.8 °F (37.1 °C) (Oral)   Resp 18   Ht 152.4 cm (60\")   Wt 65.8 kg (145 lb)   LMP  (LMP Unknown)   SpO2 99%   BMI 28.32 kg/m²      Physical Exam  Constitutional:       General: She is not in acute distress.     Appearance: Normal appearance. She is obese. She is not ill-appearing.   HENT:      Head: Normocephalic and atraumatic.      Nose: Nose normal. No congestion or rhinorrhea.      Mouth/Throat:      Mouth: Mucous membranes are dry.      Pharynx: Oropharynx is clear. No oropharyngeal exudate or posterior oropharyngeal erythema.   Eyes:      Extraocular Movements: Extraocular movements intact.      Conjunctiva/sclera: Conjunctivae normal.      Pupils: Pupils are equal, round, and reactive to light.   Cardiovascular:      Rate and Rhythm: Normal rate and regular rhythm.      Pulses: Normal pulses.      Heart sounds: Normal heart sounds.   Pulmonary:      Effort: Pulmonary effort is normal. No respiratory distress.      Breath sounds: Normal breath sounds.   Abdominal:      General: Abdomen is flat. Bowel sounds are normal. There is no distension.      Palpations: Abdomen is soft.      Tenderness: There is abdominal tenderness. There is no guarding or rebound.      Comments: Endorsing diffuse tenderness on exam without any reaction to my abdominal exam   Musculoskeletal:         General: No swelling or tenderness. Normal range of motion.      Cervical back: Normal range of motion and neck supple.   Skin:     General: Skin is warm and dry.      Capillary Refill: Capillary refill takes less than 2 seconds.      Findings: Lesion present.      Comments: Diffuse lesions throughout the skin that appear consistent with self-mutilation   Neurological:      General: No focal deficit present.      Mental Status: She is alert and oriented to person, place, and " time. Mental status is at baseline.      Cranial Nerves: No cranial nerve deficit.      Sensory: No sensory deficit.      Motor: No weakness.      Coordination: Coordination normal.   Psychiatric:         Mood and Affect: Mood normal.         Behavior: Behavior normal.         Thought Content: Thought content normal.         Judgment: Judgment normal.         Procedures    ED Course:    ED Course as of 02/02/25 2213   Sun Feb 02, 2025   1610 Multiple attempts at IV access per nursing staff which were unsuccessful.  Went to bedside and was able to place a 20-gauge IV in the patient's left bicep after obtaining informed consent.  Blood was drawn and able to flush with continued blood return.  Patient tolerated procedure well with no immediate complication. [JE]      ED Course User Index  [JE] Jared Porras MD       Lab Results (last 24 hours)       Procedure Component Value Units Date/Time    COVID-19 and FLU A/B PCR, 1 HR TAT - Swab, Nasopharynx [[id number]]  (Normal) Collected: 02/02/25 1509    Specimen: Swab from Nasopharynx Updated: 02/02/25 1532     COVID19 Not Detected     Influenza A PCR Not Detected     Influenza B PCR Not Detected    Narrative:      Fact sheet for providers: https://www.fda.gov/media/316338/download    Fact sheet for patients: https://www.fda.gov/media/376773/download    Test performed by PCR.    CBC & Differential [[id number]]  (Abnormal) Collected: 02/02/25 1555    Specimen: Blood Updated: 02/02/25 1600    Narrative:      The following orders were created for panel order CBC & Differential.  Procedure                               Abnormality         Status                     ---------                               -----------         ------                     CBC Auto Differential[[id number]]        Abnormal            Final result                 Please view results for these tests on the individual orders.    Comprehensive Metabolic Panel [[id number]]  (Abnormal) Collected: 02/02/25  1555    Specimen: Blood Updated: 02/02/25 1622     Glucose 79 mg/dL      BUN 9 mg/dL      Creatinine 0.66 mg/dL      Sodium 135 mmol/L      Potassium 3.5 mmol/L      Comment: Slight hemolysis detected by analyzer. Result may be falsely elevated.        Chloride 100 mmol/L      CO2 22.5 mmol/L      Calcium 8.8 mg/dL      Total Protein 7.3 g/dL      Albumin 3.0 g/dL      ALT (SGPT) 7 U/L      AST (SGOT) 19 U/L      Alkaline Phosphatase 98 U/L      Total Bilirubin 0.2 mg/dL      Globulin 4.3 gm/dL      A/G Ratio 0.7 g/dL      BUN/Creatinine Ratio 13.6     Anion Gap 12.5 mmol/L      eGFR 119.7 mL/min/1.73     Narrative:      GFR Categories in Chronic Kidney Disease (CKD)      GFR Category          GFR (mL/min/1.73)    Interpretation  G1                     90 or greater         Normal or high (1)  G2                      60-89                Mild decrease (1)  G3a                   45-59                Mild to moderate decrease  G3b                   30-44                Moderate to severe decrease  G4                    15-29                Severe decrease  G5                    14 or less           Kidney failure          (1)In the absence of evidence of kidney disease, neither GFR category G1 or G2 fulfill the criteria for CKD.    eGFR calculation 2021 CKD-EPI creatinine equation, which does not include race as a factor    Lipase [[id number]]  (Abnormal) Collected: 02/02/25 1555    Specimen: Blood Updated: 02/02/25 1622     Lipase 9 U/L     High Sensitivity Troponin T [[id number]]  (Normal) Collected: 02/02/25 1555    Specimen: Blood Updated: 02/02/25 1625     HS Troponin T <6 ng/L     Narrative:      High Sensitive Troponin T Reference Range:  <14.0 ng/L- Negative Female for AMI  <22.0 ng/L- Negative Male for AMI  >=14 - Abnormal Female indicating possible myocardial injury.  >=22 - Abnormal Male indicating possible myocardial injury.   Clinicians would have to utilize clinical acumen, EKG, Troponin, and serial  changes to determine if it is an Acute Myocardial Infarction or myocardial injury due to an underlying chronic condition.         C-reactive Protein [[id number]]  (Abnormal) Collected: 02/02/25 1555    Specimen: Blood Updated: 02/02/25 1622     C-Reactive Protein 5.08 mg/dL     Lactic Acid, Plasma [[id number]]  (Normal) Collected: 02/02/25 1555    Specimen: Blood Updated: 02/02/25 1620     Lactate 1.5 mmol/L     Magnesium [[id number]]  (Normal) Collected: 02/02/25 1555    Specimen: Blood Updated: 02/02/25 1622     Magnesium 2.0 mg/dL     CBC Auto Differential [[id number]]  (Abnormal) Collected: 02/02/25 1555    Specimen: Blood Updated: 02/02/25 1600     WBC 9.62 10*3/mm3      RBC 3.34 10*6/mm3      Hemoglobin 8.0 g/dL      Hematocrit 25.8 %      MCV 77.2 fL      MCH 24.0 pg      MCHC 31.0 g/dL      RDW 14.4 %      RDW-SD 40.2 fl      MPV 8.2 fL      Platelets 526 10*3/mm3      Neutrophil % 60.5 %      Lymphocyte % 26.5 %      Monocyte % 8.6 %      Eosinophil % 3.3 %      Basophil % 0.7 %      Immature Grans % 0.4 %      Neutrophils, Absolute 5.81 10*3/mm3      Lymphocytes, Absolute 2.55 10*3/mm3      Monocytes, Absolute 0.83 10*3/mm3      Eosinophils, Absolute 0.32 10*3/mm3      Basophils, Absolute 0.07 10*3/mm3      Immature Grans, Absolute 0.04 10*3/mm3      nRBC 0.0 /100 WBC     hCG, Serum, Qualitative [[id number]]  (Normal) Collected: 02/02/25 1802    Specimen: Blood Updated: 02/02/25 1820     HCG Qualitative Negative    Basic Metabolic Panel [[id number]]  (Abnormal) Collected: 02/02/25 2041    Specimen: Blood Updated: 02/02/25 2105     Glucose 95 mg/dL      BUN 7 mg/dL      Creatinine 0.52 mg/dL      Sodium 133 mmol/L      Potassium 3.2 mmol/L      Chloride 102 mmol/L      CO2 19.5 mmol/L      Calcium 7.9 mg/dL      BUN/Creatinine Ratio 13.5     Anion Gap 11.5 mmol/L      eGFR 126.8 mL/min/1.73     Narrative:      GFR Categories in Chronic Kidney Disease (CKD)      GFR Category          GFR (mL/min/1.73)     Interpretation  G1                     90 or greater         Normal or high (1)  G2                      60-89                Mild decrease (1)  G3a                   45-59                Mild to moderate decrease  G3b                   30-44                Moderate to severe decrease  G4                    15-29                Severe decrease  G5                    14 or less           Kidney failure          (1)In the absence of evidence of kidney disease, neither GFR category G1 or G2 fulfill the criteria for CKD.    eGFR calculation 2021 CKD-EPI creatinine equation, which does not include race as a factor    CBC & Differential [[id number]]  (Abnormal) Collected: 02/02/25 2041    Specimen: Blood Updated: 02/02/25 2052    Narrative:      The following orders were created for panel order CBC & Differential.  Procedure                               Abnormality         Status                     ---------                               -----------         ------                     CBC Auto Differential[[id number]]        Abnormal            Final result                 Please view results for these tests on the individual orders.    CBC Auto Differential [[id number]]  (Abnormal) Collected: 02/02/25 2041    Specimen: Blood Updated: 02/02/25 2052     WBC 9.83 10*3/mm3      RBC 3.04 10*6/mm3      Hemoglobin 7.2 g/dL      Hematocrit 23.6 %      MCV 77.6 fL      MCH 23.7 pg      MCHC 30.5 g/dL      RDW 14.5 %      RDW-SD 40.9 fl      MPV 8.1 fL      Platelets 499 10*3/mm3      Neutrophil % 74.4 %      Lymphocyte % 15.2 %      Monocyte % 7.6 %      Eosinophil % 2.0 %      Basophil % 0.4 %      Immature Grans % 0.4 %      Neutrophils, Absolute 7.31 10*3/mm3      Lymphocytes, Absolute 1.49 10*3/mm3      Monocytes, Absolute 0.75 10*3/mm3      Eosinophils, Absolute 0.20 10*3/mm3      Basophils, Absolute 0.04 10*3/mm3      Immature Grans, Absolute 0.04 10*3/mm3      nRBC 0.0 /100 WBC              CT Abdomen Pelvis With  "Contrast    Result Date: 2/2/2025  FINAL REPORT TECHNIQUE: null CLINICAL HISTORY: Diffuse abdominal pain, concerns for Crohn\"s flare COMPARISON: null FINDINGS: CT abdomen and pelvis with contrast Comparison: None Findings: No consolidation or effusion. The liver enhances homogeneously. Gallbladder is unremarkable. No biliary dilatation. The portal and splenic veins appear patent. The spleen, pancreas, and adrenal glands are unremarkable. Both kidneys enhance symmetrically without hydroureteronephrosis. Circumferential wall thickening of the distal ileum along the right hemipelvis extending to the terminal ileum. Circumferential wall thickening from the cecum the proximal sigmoid colon with mild pericolonic stranding. Liquid consistency stool in the colon. Appendix is normal. No bowel obstruction, pneumoperitoneum, or pneumatosis. Nonaneurysmal aorta. No enlarged abdominal or pelvic lymph nodes. The urinary bladder and uterus are unremarkable. No pelvic free fluid. There are no aggressive osseous lesions.     Impression: IMPRESSION: 1. Inflammatory wall thickening in the distal ileum extending to the terminal ileum, and from the cecum to the proximal sigmoid colon suggesting acute Crohn's disease given patient's history. 2. Underlying liquid consistency stool reflecting diarrhea. Authenticated and Electronically Signed by Martin John MD on 02/02/2025 07:03:13 PM        MDM     Amount and/or Complexity of Data Reviewed  Decide to obtain previous medical records or to obtain history from someone other than the patient: yes        Initial impression of presenting illness: Abdominal pain    DDX: includes but is not limited to: Crohn's flare, gastroenteritis, appendicitis, SBO, urinary tract infection, malingering    Patient arrives stable with vitals interpreted by myself.     Pertinent features from physical exam: Clear to auscultation, tachycardic regular rhythm, no murmur, nontender to abdominal palpation.    Initial " diagnostic plan: CBC, CMP, lipase, UA, CRP, lactic acid, CT abdomen pelvis    Results from initial plan were reviewed and interpreted by me revealing patient with significant drop of hemoglobin from her baseline, appears to have baseline of around 12 currently 8 today.  Also with findings consistent with Crohn's flare on CT imaging    Diagnostic information from other sources: Reviewed past medical records    Interventions / Re-evaluation: Given IV fluids for symptom control, given prednisone given concerns for Crohn's flare    Results/clinical rationale were discussed with patient at bedside    Consultations/Discussion of results with other physicians: Given my concern for possible lower GI bleed with significant anemia secondary to Crohn's flare as etiology of patient's symptoms today, have discussed with Dr. Salcedo, hospitalist, and admitted to his service for gastroenterology evaluation in the morning.    Disposition plan: Admit  -----        Final diagnoses:   Crohn's disease with complication, unspecified gastrointestinal tract location          Jared Porras MD  02/02/25 2213      Electronically signed by Jared Porras MD at 02/02/25 2213       Vital Signs (last day)       Date/Time Temp Temp src Pulse Resp BP Patient Position SpO2    02/03/25 1108 98.3 (36.8) Oral -- 16 109/60 Lying 99    02/03/25 0706 98.1 (36.7) Oral 89 16 101/65 Lying 98    02/03/25 0323 98.2 (36.8) Oral 106 18 106/60 Lying 96    02/02/25 2130 98.8 (37.1) Oral 117 18 120/63 Lying 99    02/02/25 2000 -- -- 114 -- 129/79 -- 99    02/02/25 1900 -- -- 111 -- 119/65 -- 98    02/02/25 1839 -- -- 112 -- 114/71 -- 97    02/02/25 1835 -- -- 108 -- -- -- 98    02/02/25 1700 -- -- -- -- 112/75 -- 98    02/02/25 1640 -- -- -- -- 109/77 -- 100    02/02/25 1620 -- -- -- -- 117/77 -- 100    02/02/25 1600 -- -- -- -- 112/68 -- 97    02/02/25 1555 -- -- -- -- 114/77 -- 99    02/02/25 1522 -- -- -- -- 114/77 -- 100    02/02/25 1411 97.8  (36.6) Oral 131 16 133/78 Sitting 99          Current Facility-Administered Medications   Medication Dose Route Frequency Provider Last Rate Last Admin    acetaminophen (TYLENOL) tablet 650 mg  650 mg Oral Once Jared Porras MD        acetaminophen (TYLENOL) tablet 650 mg  650 mg Oral Q6H PRN Jagdish Rabago MD   650 mg at 02/02/25 2330    hydrocortisone 2.5 % cream 1 Application  1 Application Topical Q12H Jagdish Rabago MD   1 Application at 02/03/25 0240    methylPREDNISolone sodium succinate (SOLU-Medrol) injection 60 mg  60 mg Intravenous Daily Jagdish Rabago MD   60 mg at 02/03/25 0832    Morphine sulfate (PF) injection 2 mg  2 mg Intravenous Q4H PRN Alberto Denson DO   2 mg at 02/03/25 1135    nitroglycerin (NITROSTAT) SL tablet 0.4 mg  0.4 mg Sublingual Q5 Min PRN Jagdish Rabago MD        sodium chloride 0.9 % flush 10 mL  10 mL Intravenous Q12H Jagdish Rabago MD   10 mL at 02/03/25 0832    sodium chloride 0.9 % flush 10 mL  10 mL Intravenous PRN Jagdish Rabago MD        sodium chloride 0.9 % infusion 40 mL  40 mL Intravenous PRN Jagdish Rabago MD        sodium chloride 0.9 % infusion  75 mL/hr Intravenous Continuous Jagdish Rabago MD 75 mL/hr at 02/02/25 1959 75 mL/hr at 02/02/25 1959     Lab Results (last 24 hours)       Procedure Component Value Units Date/Time    C-reactive Protein [[id number]]  (Abnormal) Collected: 02/03/25 0553    Specimen: Blood Updated: 02/03/25 0933     C-Reactive Protein 4.19 mg/dL     CBC & Differential [[id number]]  (Abnormal) Collected: 02/03/25 0553    Specimen: Blood Updated: 02/03/25 0648    Narrative:      The following orders were created for panel order CBC & Differential.  Procedure                               Abnormality         Status                     ---------                               -----------         ------                     CBC Auto Differential[[id number]]        Abnormal            Final result               Scan Slide[[id number]]                                        Final result                 Please view results for these tests on the individual orders.    CBC Auto Differential [[id number]]  (Abnormal) Collected: 02/03/25 0553    Specimen: Blood Updated: 02/03/25 0648     WBC 11.88 10*3/mm3      RBC 3.08 10*6/mm3      Hemoglobin 7.1 g/dL      Hematocrit 24.4 %      MCV 79.2 fL      MCH 23.1 pg      MCHC 29.1 g/dL      RDW 14.5 %      RDW-SD 41.9 fl      MPV 8.2 fL      Platelets 509 10*3/mm3      Neutrophil % 92.7 %      Lymphocyte % 6.1 %      Monocyte % 0.6 %      Eosinophil % 0.0 %      Basophil % 0.2 %      Immature Grans % 0.4 %      Neutrophils, Absolute 11.02 10*3/mm3      Lymphocytes, Absolute 0.72 10*3/mm3      Monocytes, Absolute 0.07 10*3/mm3      Eosinophils, Absolute 0.00 10*3/mm3      Basophils, Absolute 0.02 10*3/mm3      Immature Grans, Absolute 0.05 10*3/mm3      nRBC 0.0 /100 WBC     Scan Slide [[id number]] Collected: 02/03/25 0553    Specimen: Blood Updated: 02/03/25 0648     Anisocytosis Mod/2+     Hypochromia Large/3+     WBC Morphology Normal     Platelet Estimate Increased    Basic Metabolic Panel [[id number]]  (Abnormal) Collected: 02/03/25 0553    Specimen: Blood Updated: 02/03/25 0636     Glucose 107 mg/dL      BUN 5 mg/dL      Creatinine 0.36 mg/dL      Sodium 137 mmol/L      Potassium 3.9 mmol/L      Chloride 105 mmol/L      CO2 19.3 mmol/L      Calcium 8.0 mg/dL      BUN/Creatinine Ratio 13.9     Anion Gap 12.7 mmol/L      eGFR 138.5 mL/min/1.73     Narrative:      GFR Categories in Chronic Kidney Disease (CKD)      GFR Category          GFR (mL/min/1.73)    Interpretation  G1                     90 or greater         Normal or high (1)  G2                      60-89                Mild decrease (1)  G3a                   45-59                Mild to moderate decrease  G3b                   30-44                Moderate to severe decrease  G4                    15-29                Severe decrease  G5                     14 or less           Kidney failure          (1)In the absence of evidence of kidney disease, neither GFR category G1 or G2 fulfill the criteria for CKD.    eGFR calculation 2021 CKD-EPI creatinine equation, which does not include race as a factor    Hemoglobin & Hematocrit, Blood [[id number]]  (Abnormal) Collected: 02/03/25 0553    Specimen: Blood Updated: 02/03/25 0615     Hemoglobin 7.2 g/dL      Hematocrit 24.5 %     Urinalysis, Microscopic Only - Urine, Clean Catch [[id number]]  (Abnormal) Collected: 02/02/25 2329    Specimen: Urine, Clean Catch Updated: 02/03/25 0017     RBC, UA 0-2 /HPF      WBC, UA 0-2 /HPF      Comment: Urine culture not indicated.        Bacteria, UA Trace /HPF      Squamous Epithelial Cells, UA 3-6 /HPF      Hyaline Casts, UA 0-2 /LPF      Methodology Manual Light Microscopy    Fentanyl, Urine - Urine, Clean Catch [[id number]]  (Normal) Collected: 02/02/25 2329    Specimen: Urine, Clean Catch Updated: 02/03/25 0012     Fentanyl, Urine Negative    Narrative:      Negative Threshold:      Fentanyl 5 ng/mL     The normal value for the drug tested is negative. This report includes final unconfirmed screening results to be used for medical treatment purposes only. Unconfirmed results must not be used for non-medical purposes such as employment or legal testing. Clinical consideration should be applied to any drug of abuse test, particularly when unconfirmed results are used.           Hemoglobin & Hematocrit, Blood [[id number]]  (Abnormal) Collected: 02/03/25 0002    Specimen: Blood Updated: 02/03/25 0007     Hemoglobin 7.4 g/dL      Hematocrit 24.8 %     Urine Drug Screen - Urine, Clean Catch [[id number]]  (Abnormal) Collected: 02/02/25 2329    Specimen: Urine, Clean Catch Updated: 02/03/25 0001     THC, Screen, Urine Positive     Phencyclidine (PCP), Urine Negative     Cocaine Screen, Urine Negative     Methamphetamine, Ur Positive     Opiate Screen Negative     Amphetamine Screen, Urine Positive      Benzodiazepine Screen, Urine Negative     Tricyclic Antidepressants Screen Negative     Methadone Screen, Urine Negative     Barbiturates Screen, Urine Negative     Oxycodone Screen, Urine Negative     Buprenorphine, Screen, Urine Positive    Narrative:      Cutoff For Drugs Screened:    Amphetamines               500 ng/ml  Barbiturates               200 ng/ml  Benzodiazepines            150 ng/ml  Cocaine                    150 ng/ml  Methadone                  200 ng/ml  Opiates                    100 ng/ml  Phencyclidine               25 ng/ml  THC                         50 ng/ml  Methamphetamine            500 ng/ml  Tricyclic Antidepressants  300 ng/ml  Oxycodone                  100 ng/ml  Buprenorphine               10 ng/ml    The normal value for all drugs tested is negative. This report includes unconfirmed screening results, with the cutoff values listed, to be used for medical treatment purposes only.  Unconfirmed results must not be used for non-medical purposes such as employment or legal testing.  Clinical consideration should be applied to any drug of abuse test, particularly when unconfirmed results are used.      Pregnancy, Urine - Urine, Clean Catch [[id number]]  (Normal) Collected: 02/02/25 2329    Specimen: Urine, Clean Catch Updated: 02/03/25 0001     HCG, Urine QL Negative    Urinalysis With Culture If Indicated - Urine, Clean Catch [[id number]]  (Abnormal) Collected: 02/02/25 2329    Specimen: Urine, Clean Catch Updated: 02/03/25 0000     Color, UA Yellow     Appearance, UA Clear     pH, UA 6.0     Specific Gravity, UA >=1.030     Glucose, UA Negative     Ketones, UA 40 mg/dL (2+)     Bilirubin, UA Negative     Blood, UA Negative     Protein, UA Negative     Leuk Esterase, UA Trace     Nitrite, UA Negative     Urobilinogen, UA 0.2 E.U./dL    Narrative:      In absence of clinical symptoms, the presence of pyuria, bacteria, and/or nitrites on the urinalysis result does not correlate with  infection.    Basic Metabolic Panel [[id number]]  (Abnormal) Collected: 02/02/25 2041    Specimen: Blood Updated: 02/02/25 2105     Glucose 95 mg/dL      BUN 7 mg/dL      Creatinine 0.52 mg/dL      Sodium 133 mmol/L      Potassium 3.2 mmol/L      Chloride 102 mmol/L      CO2 19.5 mmol/L      Calcium 7.9 mg/dL      BUN/Creatinine Ratio 13.5     Anion Gap 11.5 mmol/L      eGFR 126.8 mL/min/1.73     Narrative:      GFR Categories in Chronic Kidney Disease (CKD)      GFR Category          GFR (mL/min/1.73)    Interpretation  G1                     90 or greater         Normal or high (1)  G2                      60-89                Mild decrease (1)  G3a                   45-59                Mild to moderate decrease  G3b                   30-44                Moderate to severe decrease  G4                    15-29                Severe decrease  G5                    14 or less           Kidney failure          (1)In the absence of evidence of kidney disease, neither GFR category G1 or G2 fulfill the criteria for CKD.    eGFR calculation 2021 CKD-EPI creatinine equation, which does not include race as a factor    CBC & Differential [[id number]]  (Abnormal) Collected: 02/02/25 2041    Specimen: Blood Updated: 02/02/25 2052    Narrative:      The following orders were created for panel order CBC & Differential.  Procedure                               Abnormality         Status                     ---------                               -----------         ------                     CBC Auto Differential[[id number]]        Abnormal            Final result                 Please view results for these tests on the individual orders.    CBC Auto Differential [[id number]]  (Abnormal) Collected: 02/02/25 2041    Specimen: Blood Updated: 02/02/25 2052     WBC 9.83 10*3/mm3      RBC 3.04 10*6/mm3      Hemoglobin 7.2 g/dL      Hematocrit 23.6 %      MCV 77.6 fL      MCH 23.7 pg      MCHC 30.5 g/dL      RDW 14.5 %      RDW-SD  40.9 fl      MPV 8.1 fL      Platelets 499 10*3/mm3      Neutrophil % 74.4 %      Lymphocyte % 15.2 %      Monocyte % 7.6 %      Eosinophil % 2.0 %      Basophil % 0.4 %      Immature Grans % 0.4 %      Neutrophils, Absolute 7.31 10*3/mm3      Lymphocytes, Absolute 1.49 10*3/mm3      Monocytes, Absolute 0.75 10*3/mm3      Eosinophils, Absolute 0.20 10*3/mm3      Basophils, Absolute 0.04 10*3/mm3      Immature Grans, Absolute 0.04 10*3/mm3      nRBC 0.0 /100 WBC     hCG, Serum, Qualitative [[id number]]  (Normal) Collected: 02/02/25 1802    Specimen: Blood Updated: 02/02/25 1820     HCG Qualitative Negative    High Sensitivity Troponin T [[id number]]  (Normal) Collected: 02/02/25 1555    Specimen: Blood Updated: 02/02/25 1625     HS Troponin T <6 ng/L     Narrative:      High Sensitive Troponin T Reference Range:  <14.0 ng/L- Negative Female for AMI  <22.0 ng/L- Negative Male for AMI  >=14 - Abnormal Female indicating possible myocardial injury.  >=22 - Abnormal Male indicating possible myocardial injury.   Clinicians would have to utilize clinical acumen, EKG, Troponin, and serial changes to determine if it is an Acute Myocardial Infarction or myocardial injury due to an underlying chronic condition.         Comprehensive Metabolic Panel [[id number]]  (Abnormal) Collected: 02/02/25 1555    Specimen: Blood Updated: 02/02/25 1622     Glucose 79 mg/dL      BUN 9 mg/dL      Creatinine 0.66 mg/dL      Sodium 135 mmol/L      Potassium 3.5 mmol/L      Comment: Slight hemolysis detected by analyzer. Result may be falsely elevated.        Chloride 100 mmol/L      CO2 22.5 mmol/L      Calcium 8.8 mg/dL      Total Protein 7.3 g/dL      Albumin 3.0 g/dL      ALT (SGPT) 7 U/L      AST (SGOT) 19 U/L      Alkaline Phosphatase 98 U/L      Total Bilirubin 0.2 mg/dL      Globulin 4.3 gm/dL      A/G Ratio 0.7 g/dL      BUN/Creatinine Ratio 13.6     Anion Gap 12.5 mmol/L      eGFR 119.7 mL/min/1.73     Narrative:      GFR Categories in  Chronic Kidney Disease (CKD)      GFR Category          GFR (mL/min/1.73)    Interpretation  G1                     90 or greater         Normal or high (1)  G2                      60-89                Mild decrease (1)  G3a                   45-59                Mild to moderate decrease  G3b                   30-44                Moderate to severe decrease  G4                    15-29                Severe decrease  G5                    14 or less           Kidney failure          (1)In the absence of evidence of kidney disease, neither GFR category G1 or G2 fulfill the criteria for CKD.    eGFR calculation 2021 CKD-EPI creatinine equation, which does not include race as a factor    Lipase [[id number]]  (Abnormal) Collected: 02/02/25 1555    Specimen: Blood Updated: 02/02/25 1622     Lipase 9 U/L     C-reactive Protein [[id number]]  (Abnormal) Collected: 02/02/25 1555    Specimen: Blood Updated: 02/02/25 1622     C-Reactive Protein 5.08 mg/dL     Magnesium [[id number]]  (Normal) Collected: 02/02/25 1555    Specimen: Blood Updated: 02/02/25 1622     Magnesium 2.0 mg/dL     Lactic Acid, Plasma [[id number]]  (Normal) Collected: 02/02/25 1555    Specimen: Blood Updated: 02/02/25 1620     Lactate 1.5 mmol/L     CBC & Differential [[id number]]  (Abnormal) Collected: 02/02/25 1555    Specimen: Blood Updated: 02/02/25 1600    Narrative:      The following orders were created for panel order CBC & Differential.  Procedure                               Abnormality         Status                     ---------                               -----------         ------                     CBC Auto Differential[[id number]]        Abnormal            Final result                 Please view results for these tests on the individual orders.    CBC Auto Differential [[id number]]  (Abnormal) Collected: 02/02/25 1555    Specimen: Blood Updated: 02/02/25 1600     WBC 9.62 10*3/mm3      RBC 3.34 10*6/mm3      Hemoglobin 8.0 g/dL       "Hematocrit 25.8 %      MCV 77.2 fL      MCH 24.0 pg      MCHC 31.0 g/dL      RDW 14.4 %      RDW-SD 40.2 fl      MPV 8.2 fL      Platelets 526 10*3/mm3      Neutrophil % 60.5 %      Lymphocyte % 26.5 %      Monocyte % 8.6 %      Eosinophil % 3.3 %      Basophil % 0.7 %      Immature Grans % 0.4 %      Neutrophils, Absolute 5.81 10*3/mm3      Lymphocytes, Absolute 2.55 10*3/mm3      Monocytes, Absolute 0.83 10*3/mm3      Eosinophils, Absolute 0.32 10*3/mm3      Basophils, Absolute 0.07 10*3/mm3      Immature Grans, Absolute 0.04 10*3/mm3      nRBC 0.0 /100 WBC     COVID-19 and FLU A/B PCR, 1 HR TAT - Swab, Nasopharynx [[id number]]  (Normal) Collected: 02/02/25 1509    Specimen: Swab from Nasopharynx Updated: 02/02/25 1532     COVID19 Not Detected     Influenza A PCR Not Detected     Influenza B PCR Not Detected    Narrative:      Fact sheet for providers: https://www.fda.gov/media/278917/download    Fact sheet for patients: https://www.fda.gov/media/209078/download    Test performed by PCR.          Imaging Results (Last 24 Hours)       Procedure Component Value Units Date/Time    CT Abdomen Pelvis With Contrast [[id number]] Collected: 02/02/25 1903     Updated: 02/02/25 1904    Narrative:      FINAL REPORT    TECHNIQUE:  null    CLINICAL HISTORY:  Diffuse abdominal pain, concerns for Crohn\"s flare    COMPARISON:  null    FINDINGS:  CT abdomen and pelvis with contrast    Comparison: None    Findings:    No consolidation or effusion.    The liver enhances homogeneously. Gallbladder is unremarkable.    No biliary dilatation. The portal and splenic veins appear patent.    The spleen, pancreas, and adrenal glands are unremarkable.    Both kidneys enhance symmetrically without hydroureteronephrosis.    Circumferential wall thickening of the distal ileum along the right hemipelvis extending to the terminal ileum.    Circumferential wall thickening from the cecum the proximal sigmoid colon with mild pericolonic " stranding.    Liquid consistency stool in the colon. Appendix is normal.    No bowel obstruction, pneumoperitoneum, or pneumatosis.    Nonaneurysmal aorta.    No enlarged abdominal or pelvic lymph nodes.    The urinary bladder and uterus are unremarkable.    No pelvic free fluid.    There are no aggressive osseous lesions.      Impression:      IMPRESSION:    1. Inflammatory wall thickening in the distal ileum extending to the terminal ileum, and from the cecum to the proximal sigmoid colon suggesting acute Crohn's disease given patient's history.    2. Underlying liquid consistency stool reflecting diarrhea.    Authenticated and Electronically Signed by Martin John MD on  02/02/2025 07:03:13 PM          Physician Progress Notes (last 24 hours)  Notes from 02/02/25 1145 through 02/03/25 1145   No notes of this type exist for this encounter.       Consult Notes (last 24 hours)  Notes from 02/02/25 1145 through 02/03/25 1145   No notes of this type exist for this encounter.

## 2025-02-03 NOTE — H&P
[facility]    Patient Identification:  Name:  Shira Toro  Age:  34 y.o.  Sex:  female  :  1990  MRN:  [id number]   Visit Number:  [id number]  Admit Date: 2025   Room number:    Primary Care Physician:  Gin Yoo APRN    Date of Admission: 2025     Subjective     Chief complaint:    Chief Complaint   Patient presents with    Abdominal Pain       History of presenting illness:    Assessment & Plan     This is a 34-year-old female with a past medical history of substance abuse, fibromyalgia, anxiety, depression, GERD, and Crohn's disease presenting with abdominal pain.  Patient states that for the last 4 days she has been experiencing worsening abdominal pain and diarrhea.  Patient is concerned that she has gone through a Crohn's flare.  She has also noticed several episodes of hematochezia in the past several weeks but none today.    In ED, blood pressure 112/75, heart rate 131, respiratory rate 16, temperature 97.8 and O2 saturation 99% on room air.  Labs on arrival showed sodium 135, potassium 3.5, BUN 9, creatinine 0.6, CRP 5.08, lactic acid 5.5, WBC 9.6, hemoglobin 8, platelet count 426.  CT abdomen showed inflammatory wall thickening in the distal ileum extending to the terminal ileum and from the cecum to the proximal sigmoid colon suggesting acute Crohn's disease.    ---------------------------------------------------------------------------------------------------------------------   Review of Systems   Constitutional:  Positive for fatigue.   HENT:  Negative for ear pain and sinus pain.    Eyes:  Negative for pain.   Respiratory:  Negative for choking.    Cardiovascular:  Negative for leg swelling.   Gastrointestinal:  Positive for abdominal pain and blood in stool. Negative for nausea.   Endocrine: Negative for polydipsia.   Genitourinary:  Negative for dysuria.   Musculoskeletal:  Negative for gait problem.   Allergic/Immunologic:  Negative for food allergies.   Neurological:  Negative for headaches.   Hematological:  Does not bruise/bleed easily.     ---------------------------------------------------------------------------------------------------------------------   Past Medical History:   Diagnosis Date    Anemia     Anxiety     Arthritis     Autoimmune disease     chrohns related.     Crohn's disease     Fibromyalgia     Substance abuse      Past Surgical History:   Procedure Laterality Date    COLONOSCOPY      URACHUS EXCISION      urachus rupture as a baby     Family History   Problem Relation Age of Onset    Coronary artery disease Mother     Heart attack Mother     Hypertension Mother     Hypertension Brother      Social History     Socioeconomic History    Marital status: Single   Tobacco Use    Smoking status: Former     Current packs/day: 0.50     Types: Cigarettes     Passive exposure: Past    Smokeless tobacco: Never   Vaping Use    Vaping status: Every Day    Substances: Nicotine, Flavoring    Devices: Refillable tank    Passive vaping exposure: Yes   Substance and Sexual Activity    Alcohol use: Never    Drug use: Yes     Types: Heroin, Marijuana, Methamphetamines    Sexual activity: Yes     Partners: Male     Birth control/protection: Nexplanon     ---------------------------------------------------------------------------------------------------------------------   Allergies:  Codeine, Ibuprofen, Naproxen, Ondansetron hcl, Tramadol, Acetaminophen-codeine, and Adalimumab  ---------------------------------------------------------------------------------------------------------------------   Medications below are reported home medications pulling from within the system; at this time, these medications have not been reconciled unless otherwise specified and are in the verification process for further verifcation as current home medications.      Prior to Admission Medications       Prescriptions Last Dose Informant Patient  Reported? Taking?    buPROPion XL (WELLBUTRIN XL) 150 MG 24 hr tablet   Yes No    Take 1 tablet by mouth 2 (Two) Times a Day.    cyanocobalamin 1000 MCG/ML injection   Yes No    Inject 1 mL under the skin into the appropriate area as directed Every 30 (Thirty) Days.    DULoxetine (CYMBALTA) 60 MG capsule   Yes No    Take 1 capsule by mouth Every Morning.    Patient not taking:  Reported on 9/12/2024    folic acid (FOLVITE) 1 MG tablet   Yes No    TAKE 1 TABLET (1 MG TOTAL) BY MOUTH 1 (ONE) TIME EACH DAY.    Patient not taking:  Reported on 9/12/2024    gabapentin (NEURONTIN) 800 MG tablet   Yes No    1 tablet 3 (Three) Times a Day.    Golimumab 100 MG/ML solution auto-injector   Yes No    Inject  under the skin into the appropriate area as directed.    loperamide (IMODIUM) 2 MG capsule   Yes No    omeprazole (priLOSEC) 20 MG capsule   Yes No    Take 1 capsule by mouth Daily.    predniSONE (DELTASONE) 10 MG tablet   Yes No    Patient not taking:  Reported on 9/12/2024    promethazine (PHENERGAN) 25 MG tablet   Yes No    TAKE 1 TABLET BY MOUTH 3 TIMES DAILY AS NEEDED FOR NAUSEA    QUEtiapine (SEROquel) 50 MG tablet   Yes No    TAKE 1-2 TABLETS BY MOUTH EVERY NIGHT AS NEEDED    silver sulfadiazine (Silvadene) 1 % cream   No No    Apply 1 Application topically to the appropriate area as directed 2 (Two) Times a Day.    tiZANidine (ZANAFLEX) 4 MG tablet   Yes No          Objective     Vital Signs:  Temp:  [97.8 °F (36.6 °C)] 97.8 °F (36.6 °C)  Heart Rate:  [108-131] 111  Resp:  [16] 16  BP: (109-133)/(65-78) 119/65    Mean Arterial Pressure (Non-Invasive) for the past 24 hrs (Last 3 readings):   Noninvasive MAP (mmHg)   02/02/25 1900 91   02/02/25 1839 85   02/02/25 1700 88     SpO2:  [97 %-100 %] 98 %  on   ;   Device (Oxygen Therapy): room air  Body mass index is 28.32 kg/m².    Wt Readings from Last 3 Encounters:   02/02/25 65.8 kg (145 lb)   09/12/24 65.8 kg (145 lb)   08/06/24 65.3 kg (143 lb 15.4 oz)       "----------------------------------------------------------------------------------------------------------------------  PHYSICAL EXAMINATION:  GENERAL: The patient is well developed and nontoxic.  HEENT: Anicteric sclerae, PERRLA, EOMI. Oropharynx clear. Moist mucous membranes. Conjunctivae appear well perfused.  CHEST: Chest wall is nontender.  HEART: Regular rate and rhythm without murmurs.  LUNGS: Clear to auscultation bilaterally.  ABDOMEN: Soft, positive bowel sounds, nontender, no organomegaly.  RECTAL: Deferred.  SKIN: No rash, no excessive bruising, petechiae, or purpura.  NEUROLOGIC: Cranial nerves II-XII intact without motor/sensory deficit.    ---------------------------------------------------------------------------------------------------------------------  --------------------------------------------------------------------------------------------------------------------  LABS:    CBC and coagulation:  Results from last 7 days   Lab Units 02/02/25  1555   LACTATE mmol/L 1.5   CRP mg/dL 5.08*   WBC 10*3/mm3 9.62   HEMOGLOBIN g/dL 8.0*   HEMATOCRIT % 25.8*   MCV fL 77.2*   MCHC g/dL 31.0*   PLATELETS 10*3/mm3 526*     Acid/base balance:      Renal and electrolytes:  Results from last 7 days   Lab Units 02/02/25  1555   SODIUM mmol/L 135*   POTASSIUM mmol/L 3.5   MAGNESIUM mg/dL 2.0   CHLORIDE mmol/L 100   CO2 mmol/L 22.5   BUN mg/dL 9   CREATININE mg/dL 0.66   CALCIUM mg/dL 8.8   GLUCOSE mg/dL 79     Estimated Creatinine Clearance: 103.5 mL/min (by C-G formula based on SCr of 0.66 mg/dL).    Liver and pancreatic function:  Results from last 7 days   Lab Units 02/02/25  1555   ALBUMIN g/dL 3.0*   BILIRUBIN mg/dL 0.2   ALK PHOS U/L 98   AST (SGOT) U/L 19   ALT (SGPT) U/L 7   LIPASE U/L 9*     Endocrine function:  No results found for: \"HGBA1C\"  Point of care bedside glucose levels:      Lab Results   Component Value Date    TSH 0.56 06/17/2022     Cardiac:  Results from last 7 days   Lab Units " 02/02/25  1555   HSTROP T ng/L <6       Cultures:  Lab Results   Component Value Date    COLORU Yellow 08/06/2024    CLARITYU Cloudy (A) 08/06/2024    SPECGRAV 1.010 11/16/2017    PHUR 6.5 08/06/2024    GLUCOSEU Negative 08/06/2024    KETONESU 15 mg/dL (1+) (A) 08/06/2024    BLOODU Moderate (2+) (A) 08/06/2024    NITRITEU Negative 08/06/2024    LEUKOCYTESUR Trace (A) 08/06/2024    BILIRUBINUR Negative 08/06/2024    UROBILINOGEN 0.2 E.U./dL 08/06/2024    RBCUA None Seen 08/06/2024    WBCUA 0-2 08/06/2024    BACTERIA None Seen 08/06/2024     Microbiology Results (last 10 days)       Procedure Component Value - Date/Time    COVID-19 and FLU A/B PCR, 1 HR TAT - Swab, Nasopharynx [[id number]]  (Normal) Collected: 02/02/25 1509    Lab Status: Final result Specimen: Swab from Nasopharynx Updated: 02/02/25 1532     COVID19 Not Detected     Influenza A PCR Not Detected     Influenza B PCR Not Detected    Narrative:      Fact sheet for providers: https://www.fda.gov/media/175225/download    Fact sheet for patients: https://www.fda.gov/media/862017/download    Test performed by PCR.            Lab Results   Component Value Date    PREGTESTUR Negative 09/15/2022     Pain Management Panel  More data exists         Latest Ref Rng & Units 8/6/2024 8/4/2023   Pain Management Panel   Amphetamine, Urine Qual Negative Positive  Negative    Barbiturates Screen, Urine Negative Negative  Negative    Benzodiazepine Screen, Urine Negative Negative  Negative    Buprenorphine, Screen, Urine Negative Positive  Negative    Cocaine Screen, Urine Negative Positive  Negative    Fentanyl, Urine Negative Negative  -   Methadone Screen , Urine Negative Negative  Negative    Methamphetamine, Ur Negative Positive  Negative       Details                   I have personally looked at the labs and they are summarized above.  ----------------------------------------------------------------------------------------------------------------------  Detailed  "radiology reports for the last 24 hours:    Imaging Results (Last 24 Hours)       Procedure Component Value Units Date/Time    CT Abdomen Pelvis With Contrast [[id number]] Collected: 02/02/25 1903     Updated: 02/02/25 1904    Narrative:      FINAL REPORT    TECHNIQUE:  null    CLINICAL HISTORY:  Diffuse abdominal pain, concerns for Crohn\"s flare    COMPARISON:  null    FINDINGS:  CT abdomen and pelvis with contrast    Comparison: None    Findings:    No consolidation or effusion.    The liver enhances homogeneously. Gallbladder is unremarkable.    No biliary dilatation. The portal and splenic veins appear patent.    The spleen, pancreas, and adrenal glands are unremarkable.    Both kidneys enhance symmetrically without hydroureteronephrosis.    Circumferential wall thickening of the distal ileum along the right hemipelvis extending to the terminal ileum.    Circumferential wall thickening from the cecum the proximal sigmoid colon with mild pericolonic stranding.    Liquid consistency stool in the colon. Appendix is normal.    No bowel obstruction, pneumoperitoneum, or pneumatosis.    Nonaneurysmal aorta.    No enlarged abdominal or pelvic lymph nodes.    The urinary bladder and uterus are unremarkable.    No pelvic free fluid.    There are no aggressive osseous lesions.      Impression:      IMPRESSION:    1. Inflammatory wall thickening in the distal ileum extending to the terminal ileum, and from the cecum to the proximal sigmoid colon suggesting acute Crohn's disease given patient's history.    2. Underlying liquid consistency stool reflecting diarrhea.    Authenticated and Electronically Signed by Martin John MD on  02/02/2025 07:03:13 PM          Final impressions for the last 30 days of radiology reports:    CT Abdomen Pelvis With Contrast    Result Date: 2/2/2025  IMPRESSION: 1. Inflammatory wall thickening in the distal ileum extending to the terminal ileum, and from the cecum to the proximal sigmoid colon " suggesting acute Crohn's disease given patient's history. 2. Underlying liquid consistency stool reflecting diarrhea. Authenticated and Electronically Signed by Martin John MD on 02/02/2025 07:03:13 PM       Assessment & Plan     This is a 34-year-old female with a past medical history of substance abuse, fibromyalgia, anxiety, depression, GERD, and Crohn's disease presenting with abdominal pain.     Assessment:  Acute Crohn's flare  Hematochezia  Substance abuse  Fibromyalgia  Anxiety  Depression  GERD    Plan:  - Admit to inpatient  - Start steroids  - Tylenol as needed  - Will hold off on starting any antibiotics at this time  - Start IV fluids  - Clear liquids  - N.p.o. after midnight  - Consult GI  - Monitor H/H  - Type and screen  - Monitor on TELE    Jagdish Rabago MD  [facility]  02/02/25  19:38 EST

## 2025-02-03 NOTE — PROGRESS NOTES
"Dietitian Assessment    Patient Name: Shira Toro  YOB: 1990  MRN: [id number]  Admission date: 2/2/2025    Comment:        Clinical Nutrition Assessment      Reason for Assessment MST score 2+   H&P  Past Medical History:   Diagnosis Date    Anemia     Anxiety     Arthritis     Autoimmune disease     chrohns related.     Crohn's disease     Fibromyalgia     Substance abuse        Past Surgical History:   Procedure Laterality Date    COLONOSCOPY      URACHUS EXCISION      urachus rupture as a baby            Current Problems   Patient Active Problem List   Diagnosis    Pregnancy    Fibromyalgia    Chronic fatigue    Uncomplicated opioid dependence    Crohn's disease without complication    Weight loss, non-intentional    Tobacco abuse    Subareolar mass of right breast    Social problem    Pyoderma gangrenosum    History of opioid abuse    History of Crohn's disease    Dysthymic disorder    Crohn's disease of large intestine without complication    Borderline personality disorder    Arthropathy    Crohn's colitis        Encounter Information        Trending Narrative     2/3: Pt admitted d/t Crohn's colitis. Currently NPO. EMR does not show any recent wt changes. Will monitor for diet advancement and need for ONS.      Anthropometrics        Current Height, Weight Height: 152.4 cm (60\")  Weight: 63.9 kg (140 lb 14 oz) (02/03/25 0458)   Trending Weight Hx     This admission:              PTA:     Wt Readings from Last 30 Encounters:   02/03/25 0458 63.9 kg (140 lb 14 oz)   02/03/25 0323 63.9 kg (140 lb 14 oz)   02/02/25 2130 63.9 kg (140 lb 14 oz)   02/02/25 1411 65.8 kg (145 lb)   09/12/24 1209 65.8 kg (145 lb)   08/06/24 2250 65.3 kg (143 lb 15.4 oz)   08/04/23 1121 65.3 kg (144 lb)   07/28/23 1535 64.9 kg (143 lb)   10/21/22 1428 65.8 kg (145 lb)   05/15/20 2210 56.9 kg (125 lb 6.4 oz)   06/22/19 2032 95.3 kg (210 lb)   08/09/18 1017 77.1 kg (170 lb)   11/16/17 1855 70.3 kg (155 lb)      BMI " "kg/m2 Body mass index is 27.51 kg/m².     Labs        Pertinent Labs     Results from last 7 days   Lab Units 02/03/25  0553 02/02/25 2041 02/02/25  1555   SODIUM mmol/L 137 133* 135*   POTASSIUM mmol/L 3.9 3.2* 3.5   CHLORIDE mmol/L 105 102 100   CO2 mmol/L 19.3* 19.5* 22.5   BUN mg/dL 5* 7 9   CREATININE mg/dL 0.36* 0.52* 0.66   CALCIUM mg/dL 8.0* 7.9* 8.8   BILIRUBIN mg/dL  --   --  0.2   ALK PHOS U/L  --   --  98   ALT (SGPT) U/L  --   --  7   AST (SGOT) U/L  --   --  19   GLUCOSE mg/dL 107* 95 79       Results from last 7 days   Lab Units 02/03/25  0553 02/02/25 2041 02/02/25  1555   MAGNESIUM mg/dL  --   --  2.0   HEMOGLOBIN g/dL 7.1*  7.2*   < > 8.0*   HEMATOCRIT % 24.4*  24.5*   < > 25.8*    < > = values in this interval not displayed.       No results found for: \"HGBA1C\"         Medications       Scheduled Medications acetaminophen, 650 mg, Oral, Once  hydrocortisone, 1 Application, Topical, Q12H  methylPREDNISolone sodium succinate, 60 mg, Intravenous, Daily  sodium chloride, 10 mL, Intravenous, Q12H        Infusions sodium chloride, 75 mL/hr, Last Rate: 75 mL/hr (02/02/25 1959)         PRN Medications   acetaminophen    nitroglycerin    sodium chloride    sodium chloride     Physical Findings        Trending Physical   Appearance, NFPE    --  Edema  not assessed   Bowel Function abdominal pain, diarrhea   Tubes Peripheral IV   Chewing/Swallowing NPO   Skin Wounds noted     Estimated/Assessed Needs       Energy Requirements    EST Needs, Method, Wt used     5249-7204 kcal (25-30 kcal/kg CBW)   Protein Requirements    EST Needs, Method, Wt used     50-63 g pro (0.8 gm/kg, 1.0 gm/kg)   Fluid Requirements     Estimated Needs (mL/day)     2935-9161 mL (1 mL/kcal)       Current Nutrition Orders & Evaluation of Intake       Oral Nutrition     Food Allergies NKFA   Current PO Diet NPO Diet NPO Type: Strict NPO   Supplement    PO Evaluation     Trending % PO Intake      Enteral Nutrition    Enteral Route  "   Order, Modulars, Flushes    Residual/Tolerance    TF Observation         Parenteral Nutrition     TPN Route    Total # Days on TPN    TPN Order, Lipid Details    MVI & Trace Element Freq    TPN Observation       Nutrition Diagnosis         Nutrition Dx Problem 1 Altered GI function r/t Crohn's colitis AEB NPO and CT of abdomen.       Nutrition Dx Problem 2        Intervention Goal         Intervention Goal(s) Diet advancement per MD  No significant wt changes     Nutrition Intervention        RD Action Await initiation/advancement of PO diet     Nutrition Prescription          Diet Prescription NPO Diet NPO Type: Strict NPO   Supplement Prescription      Enteral Prescription        TPN Prescription      Monitor/Evaluation        Monitor Per protocol, I&O, PO intake, Pertinent labs, Weight, Skin status, GI status, Symptoms, POC/GOC, Swallow function     RD to f/up    Electronically signed by:  Belinda Adkins RD  02/03/25 07:53 EST

## 2025-02-03 NOTE — NURSING NOTE
Seen for wound consult. Cooperative with assessment. Marylin GUZMÁN assisted.   Scattered scabs, red circular and open areas on most of body. Consistent with crohn's rash. Reports has had flare for couple of weeks. Steroids ordered per provider. Hydrocortisone order modified to apply to red and scabbed areas. Wound care orders written to Clean right lower leg, left lower leg and right great toe open wounds with wound cleanser, pat dry, thin layer silvadene to open wounds, cover with non-adherent, place gauze and secure with rolled gauze daily. Very vocal complaints of pain when assessing and cleansing wounds.   Staff to contact provider and re-consult wound nurse for new skin issues or lack of improvement with current orders. Thank you for the consult. If you have questions or concerns do not hesitate to contact me.

## 2025-02-04 LAB
ADV 40+41 DNA STL QL NAA+NON-PROBE: NOT DETECTED
ANION GAP SERPL CALCULATED.3IONS-SCNC: 9.2 MMOL/L (ref 5–15)
ASTRO TYP 1-8 RNA STL QL NAA+NON-PROBE: NOT DETECTED
BASOPHILS # BLD AUTO: 0.02 10*3/MM3 (ref 0–0.2)
BASOPHILS NFR BLD AUTO: 0.2 % (ref 0–1.5)
BUN SERPL-MCNC: 11 MG/DL (ref 6–20)
BUN/CREAT SERPL: 20.4 (ref 7–25)
C CAYETANENSIS DNA STL QL NAA+NON-PROBE: NOT DETECTED
C COLI+JEJ+UPSA DNA STL QL NAA+NON-PROBE: NOT DETECTED
C DIFF GDH + TOXINS A+B STL QL IA.RAPID: NEGATIVE
C DIFF GDH + TOXINS A+B STL QL IA.RAPID: NEGATIVE
CALCIUM SPEC-SCNC: 8 MG/DL (ref 8.6–10.5)
CHLORIDE SERPL-SCNC: 108 MMOL/L (ref 98–107)
CO2 SERPL-SCNC: 22.8 MMOL/L (ref 22–29)
CREAT SERPL-MCNC: 0.54 MG/DL (ref 0.57–1)
CRYPTOSP DNA STL QL NAA+NON-PROBE: NOT DETECTED
DEPRECATED RDW RBC AUTO: 41.1 FL (ref 37–54)
E HISTOLYT DNA STL QL NAA+NON-PROBE: NOT DETECTED
EAEC PAA PLAS AGGR+AATA ST NAA+NON-PRB: NOT DETECTED
EC STX1+STX2 GENES STL QL NAA+NON-PROBE: NOT DETECTED
EGFRCR SERPLBLD CKD-EPI 2021: 125.6 ML/MIN/1.73
EOSINOPHIL # BLD AUTO: 0.01 10*3/MM3 (ref 0–0.4)
EOSINOPHIL NFR BLD AUTO: 0.1 % (ref 0.3–6.2)
EPEC EAE GENE STL QL NAA+NON-PROBE: NOT DETECTED
ERYTHROCYTE [DISTWIDTH] IN BLOOD BY AUTOMATED COUNT: 14.3 % (ref 12.3–15.4)
ETEC LTA+ST1A+ST1B TOX ST NAA+NON-PROBE: NOT DETECTED
G LAMBLIA DNA STL QL NAA+NON-PROBE: NOT DETECTED
GLUCOSE SERPL-MCNC: 96 MG/DL (ref 65–99)
HCT VFR BLD AUTO: 22.7 % (ref 34–46.6)
HCT VFR BLD AUTO: 26.9 % (ref 34–46.6)
HCT VFR BLD AUTO: 27.2 % (ref 34–46.6)
HCT VFR BLD AUTO: 29.1 % (ref 34–46.6)
HGB BLD-MCNC: 6.8 G/DL (ref 12–15.9)
HGB BLD-MCNC: 8.4 G/DL (ref 12–15.9)
HGB BLD-MCNC: 8.4 G/DL (ref 12–15.9)
HGB BLD-MCNC: 8.8 G/DL (ref 12–15.9)
IMM GRANULOCYTES # BLD AUTO: 0.05 10*3/MM3 (ref 0–0.05)
IMM GRANULOCYTES NFR BLD AUTO: 0.5 % (ref 0–0.5)
LYMPHOCYTES # BLD AUTO: 2.55 10*3/MM3 (ref 0.7–3.1)
LYMPHOCYTES NFR BLD AUTO: 25.2 % (ref 19.6–45.3)
MCH RBC QN AUTO: 24.7 PG (ref 26.6–33)
MCHC RBC AUTO-ENTMCNC: 31.2 G/DL (ref 31.5–35.7)
MCV RBC AUTO: 79.1 FL (ref 79–97)
MONOCYTES # BLD AUTO: 0.98 10*3/MM3 (ref 0.1–0.9)
MONOCYTES NFR BLD AUTO: 9.7 % (ref 5–12)
NEUTROPHILS NFR BLD AUTO: 6.5 10*3/MM3 (ref 1.7–7)
NEUTROPHILS NFR BLD AUTO: 64.3 % (ref 42.7–76)
NOROVIRUS GI+II RNA STL QL NAA+NON-PROBE: DETECTED
NRBC BLD AUTO-RTO: 0 /100 WBC (ref 0–0.2)
P SHIGELLOIDES DNA STL QL NAA+NON-PROBE: NOT DETECTED
PLATELET # BLD AUTO: 515 10*3/MM3 (ref 140–450)
PMV BLD AUTO: 8.7 FL (ref 6–12)
POTASSIUM SERPL-SCNC: 4.2 MMOL/L (ref 3.5–5.2)
RBC # BLD AUTO: 3.4 10*6/MM3 (ref 3.77–5.28)
RVA RNA STL QL NAA+NON-PROBE: NOT DETECTED
S ENT+BONG DNA STL QL NAA+NON-PROBE: NOT DETECTED
SAPO I+II+IV+V RNA STL QL NAA+NON-PROBE: NOT DETECTED
SHIGELLA SP+EIEC IPAH ST NAA+NON-PROBE: NOT DETECTED
SODIUM SERPL-SCNC: 140 MMOL/L (ref 136–145)
V CHOL+PARA+VUL DNA STL QL NAA+NON-PROBE: NOT DETECTED
V CHOLERAE DNA STL QL NAA+NON-PROBE: NOT DETECTED
WBC NRBC COR # BLD AUTO: 10.11 10*3/MM3 (ref 3.4–10.8)
Y ENTEROCOL DNA STL QL NAA+NON-PROBE: NOT DETECTED

## 2025-02-04 PROCEDURE — 87324 CLOSTRIDIUM AG IA: CPT | Performed by: INTERNAL MEDICINE

## 2025-02-04 PROCEDURE — 87449 NOS EACH ORGANISM AG IA: CPT | Performed by: INTERNAL MEDICINE

## 2025-02-04 PROCEDURE — 25010000002 METHYLPREDNISOLONE PER 125 MG: Performed by: STUDENT IN AN ORGANIZED HEALTH CARE EDUCATION/TRAINING PROGRAM

## 2025-02-04 PROCEDURE — 25010000002 HEPARIN (PORCINE) PER 1000 UNITS: Performed by: INTERNAL MEDICINE

## 2025-02-04 PROCEDURE — 85014 HEMATOCRIT: CPT | Performed by: STUDENT IN AN ORGANIZED HEALTH CARE EDUCATION/TRAINING PROGRAM

## 2025-02-04 PROCEDURE — 85018 HEMOGLOBIN: CPT | Performed by: STUDENT IN AN ORGANIZED HEALTH CARE EDUCATION/TRAINING PROGRAM

## 2025-02-04 PROCEDURE — 80048 BASIC METABOLIC PNL TOTAL CA: CPT | Performed by: STUDENT IN AN ORGANIZED HEALTH CARE EDUCATION/TRAINING PROGRAM

## 2025-02-04 PROCEDURE — P9016 RBC LEUKOCYTES REDUCED: HCPCS

## 2025-02-04 PROCEDURE — 85025 COMPLETE CBC W/AUTO DIFF WBC: CPT | Performed by: STUDENT IN AN ORGANIZED HEALTH CARE EDUCATION/TRAINING PROGRAM

## 2025-02-04 PROCEDURE — 99232 SBSQ HOSP IP/OBS MODERATE 35: CPT | Performed by: INTERNAL MEDICINE

## 2025-02-04 PROCEDURE — 87507 IADNA-DNA/RNA PROBE TQ 12-25: CPT | Performed by: INTERNAL MEDICINE

## 2025-02-04 PROCEDURE — 25010000002 MORPHINE PER 10 MG: Performed by: INTERNAL MEDICINE

## 2025-02-04 PROCEDURE — 86900 BLOOD TYPING SEROLOGIC ABO: CPT

## 2025-02-04 PROCEDURE — 36430 TRANSFUSION BLD/BLD COMPNT: CPT

## 2025-02-04 RX ADMIN — MORPHINE SULFATE 2 MG: 2 INJECTION, SOLUTION INTRAMUSCULAR; INTRAVENOUS at 06:26

## 2025-02-04 RX ADMIN — MORPHINE SULFATE 2 MG: 2 INJECTION, SOLUTION INTRAMUSCULAR; INTRAVENOUS at 11:16

## 2025-02-04 RX ADMIN — ACETAMINOPHEN 650 MG: 325 TABLET, FILM COATED ORAL at 13:02

## 2025-02-04 RX ADMIN — Medication 10 ML: at 09:01

## 2025-02-04 RX ADMIN — HYDROCORTISONE 1 APPLICATION: 25 CREAM TOPICAL at 21:03

## 2025-02-04 RX ADMIN — BUPROPION HYDROCHLORIDE 150 MG: 150 TABLET, FILM COATED, EXTENDED RELEASE ORAL at 21:03

## 2025-02-04 RX ADMIN — METHYLPREDNISOLONE SODIUM SUCCINATE 60 MG: 125 INJECTION, POWDER, FOR SOLUTION INTRAMUSCULAR; INTRAVENOUS at 09:00

## 2025-02-04 RX ADMIN — BUPROPION HYDROCHLORIDE 150 MG: 150 TABLET, FILM COATED, EXTENDED RELEASE ORAL at 09:00

## 2025-02-04 RX ADMIN — HEPARIN SODIUM 5000 UNITS: 5000 INJECTION INTRAVENOUS; SUBCUTANEOUS at 21:03

## 2025-02-04 RX ADMIN — ACETAMINOPHEN 650 MG: 325 TABLET, FILM COATED ORAL at 03:57

## 2025-02-04 RX ADMIN — HYDROCORTISONE 1 APPLICATION: 25 CREAM TOPICAL at 14:48

## 2025-02-04 RX ADMIN — FOLIC ACID 1000 MCG: 1 TABLET ORAL at 09:00

## 2025-02-04 RX ADMIN — PANTOPRAZOLE SODIUM 40 MG: 40 TABLET, DELAYED RELEASE ORAL at 06:23

## 2025-02-04 RX ADMIN — MORPHINE SULFATE 2 MG: 2 INJECTION, SOLUTION INTRAMUSCULAR; INTRAVENOUS at 16:57

## 2025-02-04 RX ADMIN — SILVER SULFADIAZINE 1 APPLICATION: 10 CREAM TOPICAL at 14:48

## 2025-02-04 RX ADMIN — DULOXETINE HYDROCHLORIDE 60 MG: 30 CAPSULE, DELAYED RELEASE ORAL at 06:23

## 2025-02-04 RX ADMIN — HEPARIN SODIUM 5000 UNITS: 5000 INJECTION INTRAVENOUS; SUBCUTANEOUS at 09:01

## 2025-02-04 RX ADMIN — Medication 10 ML: at 21:04

## 2025-02-04 RX ADMIN — ACETAMINOPHEN 650 MG: 325 TABLET, FILM COATED ORAL at 21:03

## 2025-02-04 NOTE — PROGRESS NOTES
"Dietitian Follow-up    Patient Name: Shira Toro  YOB: 1990  MRN: [id number]  Admission date: 2/2/2025    Comment:      Clinical Nutrition Follow-up   Encounter Information        Trending Narrative     2/4: Pt is on GI soft to chew diet. Average PO intake 62% x 2 meals.     2/3: Pt admitted d/t Crohn's colitis. Currently NPO. EMR does not show any recent wt changes. Will monitor for diet advancement and need for ONS.      Anthropometrics        Current Height, Weight Height: 152.4 cm (60\")  Weight: 94.2 kg (207 lb 10.8 oz) (02/04/25 0500)       Trending Weight Hx     This admission:              PTA:     Wt Readings from Last 30 Encounters:   02/04/25 0500 94.2 kg (207 lb 10.8 oz)   02/03/25 0458 63.9 kg (140 lb 14 oz)   02/03/25 0323 63.9 kg (140 lb 14 oz)   02/02/25 2130 63.9 kg (140 lb 14 oz)   02/02/25 1411 65.8 kg (145 lb)   09/12/24 1209 65.8 kg (145 lb)   08/06/24 2250 65.3 kg (143 lb 15.4 oz)   08/04/23 1121 65.3 kg (144 lb)   07/28/23 1535 64.9 kg (143 lb)   10/21/22 1428 65.8 kg (145 lb)   05/15/20 2210 56.9 kg (125 lb 6.4 oz)   06/22/19 2032 95.3 kg (210 lb)   08/09/18 1017 77.1 kg (170 lb)   11/16/17 1855 70.3 kg (155 lb)      BMI kg/m2 Body mass index is 40.56 kg/m².     Labs        Pertinent Labs Results from last 7 days   Lab Units 02/04/25  0620 02/03/25  0553 02/02/25  2041 02/02/25  1555   SODIUM mmol/L 140 137 133* 135*   POTASSIUM mmol/L 4.2 3.9 3.2* 3.5   CHLORIDE mmol/L 108* 105 102 100   CO2 mmol/L 22.8 19.3* 19.5* 22.5   BUN mg/dL 11 5* 7 9   CREATININE mg/dL 0.54* 0.36* 0.52* 0.66   CALCIUM mg/dL 8.0* 8.0* 7.9* 8.8   BILIRUBIN mg/dL  --   --   --  0.2   ALK PHOS U/L  --   --   --  98   ALT (SGPT) U/L  --   --   --  7   AST (SGOT) U/L  --   --   --  19   GLUCOSE mg/dL 96 107* 95 79     Results from last 7 days   Lab Units 02/04/25  0620 02/02/25 2041 02/02/25  1555   MAGNESIUM mg/dL  --   --  2.0   HEMOGLOBIN g/dL 8.4*  8.4*   < > 8.0*   HEMATOCRIT % 26.9*  27.2*   < > " 25.8*    < > = values in this interval not displayed.         Medications    Scheduled Medications acetaminophen, 650 mg, Oral, Once  buPROPion SR, 150 mg, Oral, BID  DULoxetine, 60 mg, Oral, QAM  folic acid, 1,000 mcg, Oral, Daily  heparin (porcine), 5,000 Units, Subcutaneous, Q12H  hydrocortisone, 1 Application, Topical, Q12H  methylPREDNISolone sodium succinate, 60 mg, Intravenous, Daily  pantoprazole, 40 mg, Oral, Q AM  silver sulfadiazine, 1 Application, Topical, Q24H  sodium chloride, 10 mL, Intravenous, Q12H        Infusions      PRN Medications   acetaminophen    Morphine    nitroglycerin    sodium chloride    sodium chloride     Physical Findings        Trending Physical   Appearance, NFPE    --  Edema  not assessed   Bowel Function LBM: 2/4 (loose)   Tubes Peripheral IV   Chewing/Swallowing WNL   Skin Wounds noted   --  Current Nutrition Orders & Evaluation of Intake       Oral Nutrition     Food Allergies NKFA   Current PO Diet Diet: Gastrointestinal; Fiber-Restricted, Low Irritant; Texture: Soft to Chew (NDD 3); Soft to Chew: Whole Meat; Fluid Consistency: Thin (IDDSI 0)   Supplement No active supplement orders     PO Evaluation     Trending % PO Intake 2/4: 62% x 2 meals     Nutrition Diagnosis         Nutrition Dx Problem 1 Altered GI function r/t Crohn's colitis AEB GI diet and CT of abdomen.       Nutrition Dx Problem 2        Intervention Goal         Intervention Goal(s) Diet advancement per MD  No significant wt changes  PO intake meet >50% of estimated needs     Nutrition Intervention        RD Action Continue to monitor     Nutrition Prescription          Diet Prescription Diet: Gastrointestinal; Fiber-Restricted, Low Irritant; Texture: Soft to Chew (NDD 3); Soft to Chew: Whole Meat; Fluid Consistency: Thin (IDDSI 0)   Supplement Prescription No active supplement orders     Enteral Nutrition Prescription     TPN Prescription       Monitor/Evaluation        Monitor Per protocol, I&O, PO intake,  Pertinent labs, Weight, Skin status, GI status, Symptoms, POC/GOC     RD to f/up    Electronically signed by:  Belinda Adkins RD  02/04/25 11:11 EST

## 2025-02-04 NOTE — CASE MANAGEMENT/SOCIAL WORK
Continued Stay Note  [facility]     Patient Name: Shira Toro  MRN: [id number]  Today's Date: 2/4/2025    Admit Date: 2/2/2025    Plan: request sober living information   Discharge Plan    0910: CM spoke with pt at bedside. Pt very lethargic. Opens eyes when name called. Speech mumbled and unable to understand. Resource and insurance benefits information left in room. CM to follow.                  Discharge Codes    No documentation.                       Jovanna Schafer RN

## 2025-02-04 NOTE — CASE MANAGEMENT/SOCIAL WORK
Case Management/Social Work    Patient Name:  Shira Toro  YOB: 1990  MRN: [id number]  Admit Date:  2/2/2025      Sw went to see pt at bedside. Pt would open eyes for a few seconds but would close them again.  Sw will attempt to discuss social issues and resource needs with pt at a later time.       Electronically signed by:  MARÍA Milan  02/04/25 11:50 EST

## 2025-02-04 NOTE — PLAN OF CARE
Goal Outcome Evaluation:  Plan of Care Reviewed With: patient        Progress: improving     VSS, pt on RA, and pt NSR/ST on telemetry att. Medications and fluids administered per MAR. Wound care completed per orders. Discharge is pending.

## 2025-02-04 NOTE — PLAN OF CARE
Goal Outcome Evaluation:   VSS. Dressings on legs reapplied. Critical Hgb 6.9-Dr. Gillis notified. 1unit PRBC ordered and given per order. Tolerated well. No transfusion reaction noted.

## 2025-02-04 NOTE — PROGRESS NOTES
[facility] HOSPITALIST    PROGRESS NOTE    Name:  Shira Toro   Age:  34 y.o.  Sex:  female  :  1990  MRN:  [id number]   Visit Number:  [id number]  Admission Date:  2025  Date Of Service:  25  Primary Care Physician:  Gin Yoo APRN     LOS: 2 days :    Chief Complaint:      Crohn's flare    Subjective:    Patient examined this morning. Resting comfortably.  Complaining of lower extremity pain.  Advised against long-term opiate use if patient would better be served by outpatient Suboxone clinic.  Pain control as needed while wound care.  Hibiclens bathing.    Hospital Course:     This is a 34-year-old female with a past medical history of substance abuse, fibromyalgia, anxiety, depression, GERD, and Crohn's disease presenting with abdominal pain.   In ED, blood pressure 112/75, heart rate 131, respiratory rate 16, temperature 97.8 and O2 saturation 99% on room air. Labs on arrival showed sodium 135, potassium 3.5, BUN 9, creatinine 0.6, CRP 5.08, lactic acid 5.5, WBC 9.6, hemoglobin 8, platelet count 426. CT abdomen showed inflammatory wall thickening in the distal ileum extending to the terminal ileum and from the cecum to the proximal sigmoid colon suggesting acute Crohn's disease.     Review of Systems:     All systems were reviewed and negative except as mentioned in subjective, assessment and plan.    Vital Signs:    Temp:  [98 °F (36.7 °C)-98.9 °F (37.2 °C)] 98 °F (36.7 °C)  Heart Rate:  [] 100  Resp:  [16-18] 16  BP: ()/(52-82) 128/82    Intake and output:    I/O last 3 completed shifts:  In: 1120 [P.O.:240; Blood:880]  Out: 1500 [Urine:1500]  I/O this shift:  In: 480 [P.O.:480]  Out: -     Physical Examination: Examined again 2025    General Appearance:  Alert, chronically ill appearing. NAD   Head:  Atraumatic and normocephalic.   Eyes: Conjunctivae and sclerae normal, no icterus. No pallor.   Throat: No oral lesions, no thrush, oral mucosa  Enroll in the [facility] "moist.   Neck: Supple, trachea midline, no thyromegaly.   Lungs:   Non-labored breathing   Heart:  Normal S1 and S2, no murmur, No JVD.   Abdomen:   Normal bowel sounds, Soft, nontender, nondistended, no rebound tenderness.   Extremities: Supple, no edema, no cyanosis, no clubbing.   Skin: Wound superficial, circumferential wounds on torso and all extremities. Large ulcerations to bilateral lower extremities.    Neurologic: Alert and oriented x 3.      Laboratory results:    Results from last 7 days   Lab Units 02/04/25  0620 02/03/25  0553 02/02/25 2041 02/02/25  1555   SODIUM mmol/L 140 137 133* 135*   POTASSIUM mmol/L 4.2 3.9 3.2* 3.5   CHLORIDE mmol/L 108* 105 102 100   CO2 mmol/L 22.8 19.3* 19.5* 22.5   BUN mg/dL 11 5* 7 9   CREATININE mg/dL 0.54* 0.36* 0.52* 0.66   CALCIUM mg/dL 8.0* 8.0* 7.9* 8.8   BILIRUBIN mg/dL  --   --   --  0.2   ALK PHOS U/L  --   --   --  98   ALT (SGPT) U/L  --   --   --  7   AST (SGOT) U/L  --   --   --  19   GLUCOSE mg/dL 96 107* 95 79     Results from last 7 days   Lab Units 02/04/25  1304 02/04/25  0620 02/04/25  0032 02/03/25  1056 02/03/25  0553 02/03/25  0002 02/02/25 2041   WBC 10*3/mm3  --  10.11  --   --  11.88*  --  9.83   HEMOGLOBIN g/dL 8.8* 8.4*  8.4* 6.8*   < > 7.1*  7.2*   < > 7.2*   HEMATOCRIT % 29.1* 26.9*  27.2* 22.7*   < > 24.4*  24.5*   < > 23.6*   PLATELETS 10*3/mm3  --  515*  --   --  509*  --  499*    < > = values in this interval not displayed.         Results from last 7 days   Lab Units 02/02/25  1555   HSTROP T ng/L <6         No results for input(s): \"PHART\", \"WOU3TYT\", \"PO2ART\", \"SQX1ZXE\", \"BASEEXCESS\" in the last 8760 hours.   I have reviewed the patient's laboratory results.    Radiology results:    CT Abdomen Pelvis With Contrast    Result Date: 2/2/2025  FINAL REPORT TECHNIQUE: null CLINICAL HISTORY: Diffuse abdominal pain, concerns for Crohn\"s flare COMPARISON: null FINDINGS: CT abdomen and pelvis with contrast Comparison: None Findings: No " consolidation or effusion. The liver enhances homogeneously. Gallbladder is unremarkable. No biliary dilatation. The portal and splenic veins appear patent. The spleen, pancreas, and adrenal glands are unremarkable. Both kidneys enhance symmetrically without hydroureteronephrosis. Circumferential wall thickening of the distal ileum along the right hemipelvis extending to the terminal ileum. Circumferential wall thickening from the cecum the proximal sigmoid colon with mild pericolonic stranding. Liquid consistency stool in the colon. Appendix is normal. No bowel obstruction, pneumoperitoneum, or pneumatosis. Nonaneurysmal aorta. No enlarged abdominal or pelvic lymph nodes. The urinary bladder and uterus are unremarkable. No pelvic free fluid. There are no aggressive osseous lesions.     Impression: IMPRESSION: 1. Inflammatory wall thickening in the distal ileum extending to the terminal ileum, and from the cecum to the proximal sigmoid colon suggesting acute Crohn's disease given patient's history. 2. Underlying liquid consistency stool reflecting diarrhea. Authenticated and Electronically Signed by Martin John MD on 02/02/2025 07:03:13 PM   I have reviewed the patient's radiology reports.    Medication Review:     I have reviewed the patient's active and prn medications.     Problem List:      Crohn's colitis      Assessment:    Acute Crohn's Flare  Hematochezia  Polysubstance abuse  Anxiety/Depression  GERD    Plan:    Acute Crohn's Flare  Norovirus  GI consulted  Continue steroids  Continue IVF  GI PCR panel positive for norovirus  Monitor H&H; status post 1 unit PRBC  Polysubstance abuse   UDS + for methamphetamine  Counseled on cessation  Consider  consult once medically stable  Multiple wounds  Present on admission  Wound care consult  Possible leukocytoclastic vasculitis.  Hopeful will improve with steroids.    Patient will ultimately need to follow up with established GI at  upon discharge and resume  her Cheri.   Further orders as clinical course dictates.     DVT Prophylaxis: Heparin sq  Code Status: Full  Diet: As tolerated  Discharge Plan: Pending    Alberto Denson DO  02/04/25  15:33 EST    Dictated utilizing Dragon dictation.

## 2025-02-05 LAB
ANION GAP SERPL CALCULATED.3IONS-SCNC: 6 MMOL/L (ref 5–15)
BH BB BLOOD EXPIRATION DATE: NORMAL
BH BB BLOOD TYPE BARCODE: 5100
BH BB DISPENSE STATUS: NORMAL
BH BB PRODUCT CODE: NORMAL
BH BB UNIT NUMBER: NORMAL
BUN SERPL-MCNC: 14 MG/DL (ref 6–20)
BUN/CREAT SERPL: 25.9 (ref 7–25)
CALCIUM SPEC-SCNC: 7.8 MG/DL (ref 8.6–10.5)
CHLORIDE SERPL-SCNC: 109 MMOL/L (ref 98–107)
CO2 SERPL-SCNC: 27 MMOL/L (ref 22–29)
CREAT SERPL-MCNC: 0.54 MG/DL (ref 0.57–1)
CROSSMATCH INTERPRETATION: NORMAL
DEPRECATED RDW RBC AUTO: 42.5 FL (ref 37–54)
EGFRCR SERPLBLD CKD-EPI 2021: 125.6 ML/MIN/1.73
ERYTHROCYTE [DISTWIDTH] IN BLOOD BY AUTOMATED COUNT: 14.6 % (ref 12.3–15.4)
GLUCOSE SERPL-MCNC: 91 MG/DL (ref 65–99)
HAV IGM SERPL QL IA: ABNORMAL
HBV CORE IGM SERPL QL IA: ABNORMAL
HBV SURFACE AG SERPL QL IA: ABNORMAL
HCT VFR BLD AUTO: 26.9 % (ref 34–46.6)
HCV AB SER QL: REACTIVE
HGB BLD-MCNC: 8.2 G/DL (ref 12–15.9)
MCH RBC QN AUTO: 24.3 PG (ref 26.6–33)
MCHC RBC AUTO-ENTMCNC: 30.5 G/DL (ref 31.5–35.7)
MCV RBC AUTO: 79.8 FL (ref 79–97)
PLATELET # BLD AUTO: 488 10*3/MM3 (ref 140–450)
PMV BLD AUTO: 8.4 FL (ref 6–12)
POTASSIUM SERPL-SCNC: 3.9 MMOL/L (ref 3.5–5.2)
RBC # BLD AUTO: 3.37 10*6/MM3 (ref 3.77–5.28)
SODIUM SERPL-SCNC: 142 MMOL/L (ref 136–145)
UNIT  ABO: NORMAL
UNIT  RH: NORMAL
WBC NRBC COR # BLD AUTO: 9.76 10*3/MM3 (ref 3.4–10.8)

## 2025-02-05 PROCEDURE — 25010000002 METHYLPREDNISOLONE PER 125 MG: Performed by: STUDENT IN AN ORGANIZED HEALTH CARE EDUCATION/TRAINING PROGRAM

## 2025-02-05 PROCEDURE — 25010000002 MORPHINE PER 10 MG: Performed by: INTERNAL MEDICINE

## 2025-02-05 PROCEDURE — 85027 COMPLETE CBC AUTOMATED: CPT | Performed by: INTERNAL MEDICINE

## 2025-02-05 PROCEDURE — 25010000002 HEPARIN (PORCINE) PER 1000 UNITS: Performed by: INTERNAL MEDICINE

## 2025-02-05 PROCEDURE — 99232 SBSQ HOSP IP/OBS MODERATE 35: CPT | Performed by: INTERNAL MEDICINE

## 2025-02-05 PROCEDURE — 80048 BASIC METABOLIC PNL TOTAL CA: CPT | Performed by: INTERNAL MEDICINE

## 2025-02-05 PROCEDURE — 80074 ACUTE HEPATITIS PANEL: CPT | Performed by: INTERNAL MEDICINE

## 2025-02-05 RX ORDER — GABAPENTIN 400 MG/1
800 CAPSULE ORAL EVERY 8 HOURS SCHEDULED
Status: DISCONTINUED | OUTPATIENT
Start: 2025-02-05 | End: 2025-02-05

## 2025-02-05 RX ORDER — QUETIAPINE FUMARATE 25 MG/1
50 TABLET, FILM COATED ORAL NIGHTLY
Status: DISCONTINUED | OUTPATIENT
Start: 2025-02-05 | End: 2025-02-07 | Stop reason: HOSPADM

## 2025-02-05 RX ADMIN — MORPHINE SULFATE 2 MG: 2 INJECTION, SOLUTION INTRAMUSCULAR; INTRAVENOUS at 05:30

## 2025-02-05 RX ADMIN — Medication 10 ML: at 20:19

## 2025-02-05 RX ADMIN — MORPHINE SULFATE 2 MG: 2 INJECTION, SOLUTION INTRAMUSCULAR; INTRAVENOUS at 18:39

## 2025-02-05 RX ADMIN — PANTOPRAZOLE SODIUM 40 MG: 40 TABLET, DELAYED RELEASE ORAL at 06:29

## 2025-02-05 RX ADMIN — HEPARIN SODIUM 5000 UNITS: 5000 INJECTION INTRAVENOUS; SUBCUTANEOUS at 08:31

## 2025-02-05 RX ADMIN — HYDROCORTISONE 1 APPLICATION: 25 CREAM TOPICAL at 17:09

## 2025-02-05 RX ADMIN — METHYLPREDNISOLONE SODIUM SUCCINATE 60 MG: 125 INJECTION, POWDER, FOR SOLUTION INTRAMUSCULAR; INTRAVENOUS at 08:30

## 2025-02-05 RX ADMIN — HYDROCORTISONE 1 APPLICATION: 25 CREAM TOPICAL at 20:19

## 2025-02-05 RX ADMIN — HEPARIN SODIUM 5000 UNITS: 5000 INJECTION INTRAVENOUS; SUBCUTANEOUS at 20:18

## 2025-02-05 RX ADMIN — MORPHINE SULFATE 2 MG: 2 INJECTION, SOLUTION INTRAMUSCULAR; INTRAVENOUS at 12:43

## 2025-02-05 RX ADMIN — SILVER SULFADIAZINE 1 APPLICATION: 10 CREAM TOPICAL at 17:09

## 2025-02-05 RX ADMIN — QUETIAPINE FUMARATE 50 MG: 25 TABLET ORAL at 20:18

## 2025-02-05 RX ADMIN — BUPROPION HYDROCHLORIDE 150 MG: 150 TABLET, FILM COATED, EXTENDED RELEASE ORAL at 08:30

## 2025-02-05 RX ADMIN — BUPROPION HYDROCHLORIDE 150 MG: 150 TABLET, FILM COATED, EXTENDED RELEASE ORAL at 20:18

## 2025-02-05 RX ADMIN — DULOXETINE HYDROCHLORIDE 60 MG: 30 CAPSULE, DELAYED RELEASE ORAL at 06:29

## 2025-02-05 RX ADMIN — Medication 10 ML: at 08:30

## 2025-02-05 RX ADMIN — FOLIC ACID 1000 MCG: 1 TABLET ORAL at 08:31

## 2025-02-05 RX ADMIN — ACETAMINOPHEN 650 MG: 325 TABLET, FILM COATED ORAL at 16:46

## 2025-02-05 RX ADMIN — ACETAMINOPHEN 650 MG: 325 TABLET, FILM COATED ORAL at 08:36

## 2025-02-05 NOTE — PROGRESS NOTES
[facility]    PROGRESS NOTE    Name:  Shira Toro   Age:  34 y.o.  Sex:  female  :  1990  MRN:  [id number]   Visit Number:  [id number]  Admission Date:  2025  Date Of Service:  25  Primary Care Physician:  Gin Yoo APRN     LOS: 3 days :    Chief Complaint:      Crohn's flare    Subjective:    Patient examined this morning. Resting comfortably.  Eating lunch.  Abdominal pain improving.  Diarrhea improving.  No acute events overnight.    Hospital Course:     This is a 34-year-old female with a past medical history of substance abuse, fibromyalgia, anxiety, depression, GERD, and Crohn's disease presenting with abdominal pain.   In ED, blood pressure 112/75, heart rate 131, respiratory rate 16, temperature 97.8 and O2 saturation 99% on room air. Labs on arrival showed sodium 135, potassium 3.5, BUN 9, creatinine 0.6, CRP 5.08, lactic acid 5.5, WBC 9.6, hemoglobin 8, platelet count 426. CT abdomen showed inflammatory wall thickening in the distal ileum extending to the terminal ileum and from the cecum to the proximal sigmoid colon suggesting acute Crohn's disease.     Review of Systems:     All systems were reviewed and negative except as mentioned in subjective, assessment and plan.    Vital Signs:    Temp:  [97.7 °F (36.5 °C)-98.2 °F (36.8 °C)] 98 °F (36.7 °C)  Heart Rate:  [83-99] 84  Resp:  [16-18] 18  BP: ()/(53-75) 103/66    Intake and output:    I/O last 3 completed shifts:  In: 1600 [P.O.:720; Blood:880]  Out: -   No intake/output data recorded.    Physical Examination: Examined again 2025    General Appearance:  Alert, chronically ill appearing. NAD   Head:  Atraumatic and normocephalic.   Eyes: Conjunctivae and sclerae normal, no icterus. No pallor.   Throat: No oral lesions, no thrush, oral mucosa moist.   Neck: Supple, trachea midline, no thyromegaly.   Lungs:   Non-labored breathing   Heart:  Normal S1 and S2, no murmur, No JVD.  "  Abdomen:   Normal bowel sounds, Soft, nontender, nondistended, no rebound tenderness.   Extremities: Supple, no edema, no cyanosis, no clubbing.   Skin: Wound superficial, circumferential wounds on torso and all extremities. Large ulcerations to bilateral lower extremities.    Neurologic: Alert and oriented x 3.      Laboratory results:    Results from last 7 days   Lab Units 02/05/25  0759 02/04/25  0620 02/03/25  0553 02/02/25  2041 02/02/25  1555   SODIUM mmol/L 142 140 137   < > 135*   POTASSIUM mmol/L 3.9 4.2 3.9   < > 3.5   CHLORIDE mmol/L 109* 108* 105   < > 100   CO2 mmol/L 27.0 22.8 19.3*   < > 22.5   BUN mg/dL 14 11 5*   < > 9   CREATININE mg/dL 0.54* 0.54* 0.36*   < > 0.66   CALCIUM mg/dL 7.8* 8.0* 8.0*   < > 8.8   BILIRUBIN mg/dL  --   --   --   --  0.2   ALK PHOS U/L  --   --   --   --  98   ALT (SGPT) U/L  --   --   --   --  7   AST (SGOT) U/L  --   --   --   --  19   GLUCOSE mg/dL 91 96 107*   < > 79    < > = values in this interval not displayed.     Results from last 7 days   Lab Units 02/05/25  0759 02/04/25  1304 02/04/25  0620 02/03/25  1056 02/03/25  0553   WBC 10*3/mm3 9.76  --  10.11  --  11.88*   HEMOGLOBIN g/dL 8.2* 8.8* 8.4*  8.4*   < > 7.1*  7.2*   HEMATOCRIT % 26.9* 29.1* 26.9*  27.2*   < > 24.4*  24.5*   PLATELETS 10*3/mm3 488*  --  515*  --  509*    < > = values in this interval not displayed.         Results from last 7 days   Lab Units 02/02/25  1555   HSTROP T ng/L <6         No results for input(s): \"PHART\", \"QFG7MOH\", \"PO2ART\", \"LYD9ZYW\", \"BASEEXCESS\" in the last 8760 hours.   I have reviewed the patient's laboratory results.    Radiology results:    No radiology results from the last 24 hrs  I have reviewed the patient's radiology reports.    Medication Review:     I have reviewed the patient's active and prn medications.     Problem List:      Crohn's colitis      Assessment:    Acute Crohn's Flare  Hematochezia  Polysubstance " abuse  Anxiety/Depression  GERD    Plan:    Acute Crohn's Flare  Norovirus  GI consulted  Continue steroids  Continue IVF  GI PCR panel positive for norovirus  Monitor H&H; status post 1 unit PRBC  Polysubstance abuse   UDS + for methamphetamine  Counseled on cessation  Consider  consult once medically stable  Multiple wounds  Present on admission  Wound care consult  Possible leukocytoclastic vasculitis.  Hopeful will improve with steroids.    Patient will ultimately need to follow up with established GI at  upon discharge and resume her Skyrizi.   Further orders as clinical course dictates.   Plan of care reviewed from 2/4.  No changes    DVT Prophylaxis: Heparin sq  Code Status: Full  Diet: As tolerated  Discharge Plan: Pending, likely discharge home in 48 hours    Alberto Denson DO  02/05/25  15:10 EST    Dictated utilizing Dragon dictation.

## 2025-02-05 NOTE — PLAN OF CARE
Goal Outcome Evaluation:   VSS. No acute changes this shift.

## 2025-02-05 NOTE — PAYOR COMM NOTE
"To:  [employer]  From: Sunni Rosales RN  Phone: [phone]  Fax: [phone]  NPI: [id number]  TIN: [id number]  Member ID: [id number]   MRN: [id number]    Shira Weeks (34 y.o. Female)       Date of Birth   1990    Social Security Number       Address   [address] [place] [address]    Home Phone   [phone]    MRN   [id number]       Mandaeism   Voodoo    Marital Status   Single                            Admission Date   2/2/25    Admission Type   Emergency    Admitting Provider   Jagdish Rabago MD    Attending Provider   Alberto Denson DO    Department, Room/Bed   [facility] TELEMETRY 4, 423/1       Discharge Date       Discharge Disposition       Discharge Destination                                 Attending Provider: Alberto Denson DO    Allergies: Codeine, Ibuprofen, Naproxen, Ondansetron Hcl, Tramadol, Acetaminophen-codeine, Adalimumab    Isolation: Spore   Infection: Norovirus (02/04/25)   Code Status: CPR    Ht: 152.4 cm (60\")   Wt: 68.1 kg (150 lb 2.1 oz)    Admission Cmt: None   Principal Problem: Crohn's colitis [K50.10]                   Active Insurance as of 2/2/2025       Primary Coverage       Payor Plan Insurance Group Employer/Plan Group    [place] [id number]       Payor Plan Address Payor Plan Phone Number Payor Plan Fax Number Effective Dates    [employer] [address] [address] [phone]  4/1/2020 - None Entered    Formerly [facility] [id number]         Subscriber Name Subscriber Birth Date Member ID       SHIRA WEEKS 1990 [id number]                     Emergency Contacts        (Rel.) Home Phone Work Phone Mobile Phone    masha weeks (Father) [phone] -- --              Vital Signs (last day)       Date/Time Temp Temp src Pulse Resp BP Patient Position SpO2    02/05/25 1052 97.9 (36.6) Oral 84 18 105/71 Lying 96    02/05/25 0900 -- -- 92 -- -- -- --    02/05/25 0700 -- -- 90 -- -- -- --    02/05/25 0655 97.8 " (36.6) Oral 88 16 108/65 Lying 97    02/05/25 0340 98 (36.7) Oral 97 18 98/57 Lying --    02/04/25 2312 98.1 (36.7) Oral 99 16 95/53 Lying --    02/04/25 1935 98.2 (36.8) Oral 83 16 108/60 Lying --    02/04/25 1546 97.7 (36.5) Oral 93 16 117/75 Lying 98    02/04/25 1106 98 (36.7) Oral 100 16 128/82 Lying 97    02/04/25 0707 98.1 (36.7) Oral 109 16 115/76 Lying 97    02/04/25 0345 98.9 (37.2) Oral 78 16 109/75 Lying 99    02/04/25 0154 98.8 (37.1) Oral 92 16 103/66 -- 96    02/04/25 0139 98.8 (37.1) Oral 89 18 103/62 -- 94    02/04/25 0115 98.7 (37.1) Oral 96 16 101/59 -- 96          Current Facility-Administered Medications   Medication Dose Route Frequency Provider Last Rate Last Admin    acetaminophen (TYLENOL) tablet 650 mg  650 mg Oral Once Jraed Porras MD        acetaminophen (TYLENOL) tablet 650 mg  650 mg Oral Q6H PRN Jagdish Rabago MD   650 mg at 02/05/25 0836    buPROPion SR (WELLBUTRIN SR) 12 hr tablet 150 mg  150 mg Oral BID Alberto Denson DO   150 mg at 02/05/25 0830    DULoxetine (CYMBALTA) DR capsule 60 mg  60 mg Oral QAM Alberto Denson DO   60 mg at 02/05/25 0629    folic acid (FOLVITE) tablet 1,000 mcg  1,000 mcg Oral Daily Alberto Denson DO   1,000 mcg at 02/05/25 0831    heparin (porcine) 5000 UNIT/ML injection 5,000 Units  5,000 Units Subcutaneous Q12H Alberto Denson DO   5,000 Units at 02/05/25 0831    hydrocortisone 2.5 % cream 1 Application  1 Application Topical Q12H Alberto Denson DO   1 Application at 02/04/25 2103    methylPREDNISolone sodium succinate (SOLU-Medrol) injection 60 mg  60 mg Intravenous Daily Jagdish Rabago MD   60 mg at 02/05/25 0830    Morphine sulfate (PF) injection 2 mg  2 mg Intravenous Q4H PRN Alberto Denson DO   2 mg at 02/05/25 1243    nitroglycerin (NITROSTAT) SL tablet 0.4 mg  0.4 mg Sublingual Q5 Min PRN Jagdish Rabago MD        pantoprazole (PROTONIX) EC tablet 40 mg  40 mg Oral Q AM Alberto Denson DO   40 mg at 02/05/25 0629    QUEtiapine  (SEROquel) tablet 50 mg  50 mg Oral Nightly Alberto Denson DO        silver sulfadiazine (SILVADENE, SSD) 1 % cream 1 Application  1 Application Topical Q24H Alberto Denson DO   1 Application at 02/04/25 1448    sodium chloride 0.9 % flush 10 mL  10 mL Intravenous Q12H Jagdish Rabago MD   10 mL at 02/05/25 0830    sodium chloride 0.9 % flush 10 mL  10 mL Intravenous PRN Jagdish Rabago MD        sodium chloride 0.9 % infusion 40 mL  40 mL Intravenous PRN Jagdish Rabago MD         Lab Results (last 24 hours)       Procedure Component Value Units Date/Time    Basic Metabolic Panel [[id number]]  (Abnormal) Collected: 02/05/25 0759    Specimen: Blood Updated: 02/05/25 0902     Glucose 91 mg/dL      BUN 14 mg/dL      Creatinine 0.54 mg/dL      Sodium 142 mmol/L      Potassium 3.9 mmol/L      Chloride 109 mmol/L      CO2 27.0 mmol/L      Calcium 7.8 mg/dL      BUN/Creatinine Ratio 25.9     Anion Gap 6.0 mmol/L      eGFR 125.6 mL/min/1.73     Narrative:      GFR Categories in Chronic Kidney Disease (CKD)      GFR Category          GFR (mL/min/1.73)    Interpretation  G1                     90 or greater         Normal or high (1)  G2                      60-89                Mild decrease (1)  G3a                   45-59                Mild to moderate decrease  G3b                   30-44                Moderate to severe decrease  G4                    15-29                Severe decrease  G5                    14 or less           Kidney failure          (1)In the absence of evidence of kidney disease, neither GFR category G1 or G2 fulfill the criteria for CKD.    eGFR calculation 2021 CKD-EPI creatinine equation, which does not include race as a factor    CBC (No Diff) [[id number]]  (Abnormal) Collected: 02/05/25 0759    Specimen: Blood Updated: 02/05/25 0836     WBC 9.76 10*3/mm3      RBC 3.37 10*6/mm3      Hemoglobin 8.2 g/dL      Hematocrit 26.9 %      MCV 79.8 fL      MCH 24.3 pg      MCHC 30.5 g/dL      RDW  14.6 %      RDW-SD 42.5 fl      MPV 8.4 fL      Platelets 488 10*3/mm3     Hepatitis Panel, Acute [[id number]] Collected: 02/05/25 0759    Specimen: Blood Updated: 02/05/25 0833          Imaging Results (Last 24 Hours)       ** No results found for the last 24 hours. **          Orders (last 24 hrs)        Start     Ordered    02/05/25 2100  QUEtiapine (SEROquel) tablet 50 mg  Nightly         02/05/25 1319    02/05/25 1415  gabapentin (NEURONTIN) capsule 800 mg  Every 8 Hours Scheduled,   Status:  Discontinued         02/05/25 1319    02/05/25 0916  Auto Discontinue in 48 Hours if not Collected  ONCE CDIFF         02/03/25 0916    02/05/25 0600  CBC (No Diff)  Morning Draw         02/04/25 1535    02/05/25 0600  Basic Metabolic Panel  Morning Draw         02/04/25 1535    02/05/25 0600  Hepatitis Panel, Acute  Morning Draw         02/04/25 1535    02/04/25 1059  Skin Care  Daily       02/04/25 1058    02/04/25 0700  DULoxetine (CYMBALTA) DR capsule 60 mg  Every Morning         02/03/25 1707    02/04/25 0600  pantoprazole (PROTONIX) EC tablet 40 mg  Every Early Morning         02/03/25 1707    02/03/25 2100  buPROPion SR (WELLBUTRIN SR) 12 hr tablet 150 mg  2 Times Daily         02/03/25 1707    02/03/25 1800  folic acid (FOLVITE) tablet 1,000 mcg  Daily         02/03/25 1707    02/03/25 1400  heparin (porcine) 5000 UNIT/ML injection 5,000 Units  Every 12 Hours Scheduled         02/03/25 1304    02/03/25 1245  silver sulfadiazine (SILVADENE, SSD) 1 % cream 1 Application  Every 24 Hours Scheduled         02/03/25 1159    02/03/25 1200  Wound Care  Daily       02/03/25 1159    02/03/25 1128  Morphine sulfate (PF) injection 2 mg  Every 4 Hours PRN         02/03/25 1128    02/03/25 0900  methylPREDNISolone sodium succinate (SOLU-Medrol) injection 60 mg  Daily         02/02/25 1938    02/03/25 0800  Oral Care  2 Times Daily       02/02/25 1942 02/03/25 0000  Hemoglobin & Hematocrit, Blood  Every 6 Hours,   Status:   Canceled       25 233  hydrocortisone 2.5 % cream 1 Application  Every 12 Hours Scheduled         25 2237    25 2100  sodium chloride 0.9 % flush 10 mL  Every 12 Hours Scheduled         25  Vital Signs  Every 4 Hours       25  Strict Intake & Output  Every Hour       25  Intake & Output  Every Shift       25  Daily Weights  Daily       25  sodium chloride 0.9 % flush 10 mL  As Needed         25  sodium chloride 0.9 % infusion 40 mL  As Needed         25  nitroglycerin (NITROSTAT) SL tablet 0.4 mg  Every 5 Minutes PRN         25  acetaminophen (TYLENOL) tablet 650 mg  Every 6 Hours PRN         25 163  acetaminophen (TYLENOL) tablet 650 mg  Once         25 1616    Unscheduled  Up With Assistance  As Needed       25    Unscheduled  Oxygen Therapy- Nasal Cannula; Titrate 1-6 LPM Per SpO2; 90 - 95%  Continuous PRN       25    --  busPIRone (BUSPAR) 15 MG tablet  3 Times Daily         25    --  buPROPion SR (WELLBUTRIN SR) 150 MG 12 hr tablet  2 Times Daily         25 1345                     Physician Progress Notes (most recent note)        Alberto Denson DO at 25 1533              [facility]    [facility]    Name:  Shira Toro   Age:  34 y.o.  Sex:  female  :  1990  MRN:  [id number]   Visit Number:  [id number]  Admission Date:  2025  Date Of Service:  25  Primary Care Physician:  Gin Yoo APRN     LOS: 2 days :    Chief Complaint:      Crohn's flare    Subjective:    Patient examined this morning. Resting comfortably.  Complaining of lower extremity pain.  Advised against long-term opiate use if patient would better be served by  outpatient Suboxone clinic.  Pain control as needed while wound care.  Hibiclens bathing.    Hospital Course:     This is a 34-year-old female with a past medical history of substance abuse, fibromyalgia, anxiety, depression, GERD, and Crohn's disease presenting with abdominal pain.   In ED, blood pressure 112/75, heart rate 131, respiratory rate 16, temperature 97.8 and O2 saturation 99% on room air. Labs on arrival showed sodium 135, potassium 3.5, BUN 9, creatinine 0.6, CRP 5.08, lactic acid 5.5, WBC 9.6, hemoglobin 8, platelet count 426. CT abdomen showed inflammatory wall thickening in the distal ileum extending to the terminal ileum and from the cecum to the proximal sigmoid colon suggesting acute Crohn's disease.     Review of Systems:     All systems were reviewed and negative except as mentioned in subjective, assessment and plan.    Vital Signs:    Temp:  [98 °F (36.7 °C)-98.9 °F (37.2 °C)] 98 °F (36.7 °C)  Heart Rate:  [] 100  Resp:  [16-18] 16  BP: ()/(52-82) 128/82    Intake and output:    I/O last 3 completed shifts:  In: 1120 [P.O.:240; Blood:880]  Out: 1500 [Urine:1500]  I/O this shift:  In: 480 [P.O.:480]  Out: -     Physical Examination: Examined again 2/4/2025    General Appearance:  Alert, chronically ill appearing. NAD   Head:  Atraumatic and normocephalic.   Eyes: Conjunctivae and sclerae normal, no icterus. No pallor.   Throat: No oral lesions, no thrush, oral mucosa moist.   Neck: Supple, trachea midline, no thyromegaly.   Lungs:   Non-labored breathing   Heart:  Normal S1 and S2, no murmur, No JVD.   Abdomen:   Normal bowel sounds, Soft, nontender, nondistended, no rebound tenderness.   Extremities: Supple, no edema, no cyanosis, no clubbing.   Skin: Wound superficial, circumferential wounds on torso and all extremities. Large ulcerations to bilateral lower extremities.    Neurologic: Alert and oriented x 3.      Laboratory results:    Results from last 7 days   Lab Units  "02/04/25  0620 02/03/25  0553 02/02/25 2041 02/02/25  1555   SODIUM mmol/L 140 137 133* 135*   POTASSIUM mmol/L 4.2 3.9 3.2* 3.5   CHLORIDE mmol/L 108* 105 102 100   CO2 mmol/L 22.8 19.3* 19.5* 22.5   BUN mg/dL 11 5* 7 9   CREATININE mg/dL 0.54* 0.36* 0.52* 0.66   CALCIUM mg/dL 8.0* 8.0* 7.9* 8.8   BILIRUBIN mg/dL  --   --   --  0.2   ALK PHOS U/L  --   --   --  98   ALT (SGPT) U/L  --   --   --  7   AST (SGOT) U/L  --   --   --  19   GLUCOSE mg/dL 96 107* 95 79     Results from last 7 days   Lab Units 02/04/25  1304 02/04/25  0620 02/04/25  0032 02/03/25  1056 02/03/25  0553 02/03/25  0002 02/02/25 2041   WBC 10*3/mm3  --  10.11  --   --  11.88*  --  9.83   HEMOGLOBIN g/dL 8.8* 8.4*  8.4* 6.8*   < > 7.1*  7.2*   < > 7.2*   HEMATOCRIT % 29.1* 26.9*  27.2* 22.7*   < > 24.4*  24.5*   < > 23.6*   PLATELETS 10*3/mm3  --  515*  --   --  509*  --  499*    < > = values in this interval not displayed.         Results from last 7 days   Lab Units 02/02/25  1555   HSTROP T ng/L <6         No results for input(s): \"PHART\", \"QMF0MJJ\", \"PO2ART\", \"HFG5OIO\", \"BASEEXCESS\" in the last 8760 hours.   I have reviewed the patient's laboratory results.    Radiology results:    CT Abdomen Pelvis With Contrast    Result Date: 2/2/2025  FINAL REPORT TECHNIQUE: null CLINICAL HISTORY: Diffuse abdominal pain, concerns for Crohn\"s flare COMPARISON: null FINDINGS: CT abdomen and pelvis with contrast Comparison: None Findings: No consolidation or effusion. The liver enhances homogeneously. Gallbladder is unremarkable. No biliary dilatation. The portal and splenic veins appear patent. The spleen, pancreas, and adrenal glands are unremarkable. Both kidneys enhance symmetrically without hydroureteronephrosis. Circumferential wall thickening of the distal ileum along the right hemipelvis extending to the terminal ileum. Circumferential wall thickening from the cecum the proximal sigmoid colon with mild pericolonic stranding. Liquid consistency " stool in the colon. Appendix is normal. No bowel obstruction, pneumoperitoneum, or pneumatosis. Nonaneurysmal aorta. No enlarged abdominal or pelvic lymph nodes. The urinary bladder and uterus are unremarkable. No pelvic free fluid. There are no aggressive osseous lesions.     Impression: IMPRESSION: 1. Inflammatory wall thickening in the distal ileum extending to the terminal ileum, and from the cecum to the proximal sigmoid colon suggesting acute Crohn's disease given patient's history. 2. Underlying liquid consistency stool reflecting diarrhea. Authenticated and Electronically Signed by Martin John MD on 02/02/2025 07:03:13 PM   I have reviewed the patient's radiology reports.    Medication Review:     I have reviewed the patient's active and prn medications.     Problem List:      Crohn's colitis      Assessment:    Acute Crohn's Flare  Hematochezia  Polysubstance abuse  Anxiety/Depression  GERD    Plan:    Acute Crohn's Flare  Norovirus  GI consulted  Continue steroids  Continue IVF  GI PCR panel positive for norovirus  Monitor H&H; status post 1 unit PRBC  Polysubstance abuse   UDS + for methamphetamine  Counseled on cessation  Consider  consult once medically stable  Multiple wounds  Present on admission  Wound care consult  Possible leukocytoclastic vasculitis.  Hopeful will improve with steroids.    Patient will ultimately need to follow up with established GI at  upon discharge and resume her Skyrizi.   Further orders as clinical course dictates.     DVT Prophylaxis: Heparin sq  Code Status: Full  Diet: As tolerated  Discharge Plan: Pending    Alberto Denson DO  02/04/25  15:33 EST    Dictated utilizing Dragon dictation.        Electronically signed by Alberto Denson DO at 02/04/25 1534          Consult Notes (most recent note)        Dora Ernst MD at 02/03/25 1232                In Patient Consult      Date of Consultation: February 3, 2025  Patient Name: Shira Toro  MRN:  [id number]  : 1990     Referring provider: Alberto Denson DO    Primary care provider:  Gin Yoo APRN    Reason for consultation: Abnormal GI imaging, Crohn's flare, diarrhea, abdominal pain.    History of Present Illness:     34-year-old female seen as an inpatient consultation for the above.  She presented to the emergency room with abdominal pain.  Had pain for the past 3 or 4 days.  Was concerned that she would have a Crohn's flare.  She has been established in the past with [facility].  Was on Skyrizi.  Has not been on Skyrizi for at least the past 6 months or so.  There is a telephone encounter from December where  was trying to reach out to her, but unfortunately she was not reachable as she was in alf.  There have been ongoing issues with polysubstance abuse.  On presentation to the emergency room, she underwent a CT scan suggestive of an acute Crohn's flare.  See details below.        Subjective     Past Medical History:   Diagnosis Date    Anemia     Anxiety     Arthritis     Autoimmune disease     chrohns related.     Crohn's disease     Fibromyalgia     Substance abuse        Past Surgical History:   Procedure Laterality Date    COLONOSCOPY      URACHUS EXCISION      urachus rupture as a baby       Family History   Problem Relation Age of Onset    Coronary artery disease Mother     Heart attack Mother     Hypertension Mother     Hypertension Brother        Social History     Socioeconomic History    Marital status: Single   Tobacco Use    Smoking status: Former     Current packs/day: 0.50     Types: Cigarettes     Passive exposure: Past    Smokeless tobacco: Never   Vaping Use    Vaping status: Every Day    Substances: Nicotine, Flavoring    Devices: Refillable tank    Passive vaping exposure: Yes   Substance and Sexual Activity    Alcohol use: Yes     Comment: occasionally    Drug use: Yes     Types: Heroin, Marijuana, Methamphetamines     Comment: medical marijuana  card per pt    Sexual activity: Yes     Partners: Male     Birth control/protection: Nexplanon         Current Facility-Administered Medications:     acetaminophen (TYLENOL) tablet 650 mg, 650 mg, Oral, Once, EdgeJared MD    acetaminophen (TYLENOL) tablet 650 mg, 650 mg, Oral, Q6H PRN, Jagdish Rabago MD, 650 mg at 02/02/25 2330    hydrocortisone 2.5 % cream 1 Application, 1 Application, Topical, Q12H, Alberto Denson DO, 1 Application at 02/03/25 0240    methylPREDNISolone sodium succinate (SOLU-Medrol) injection 60 mg, 60 mg, Intravenous, Daily, Jagdish Rabago MD, 60 mg at 02/03/25 0832    Morphine sulfate (PF) injection 2 mg, 2 mg, Intravenous, Q4H PRN, Alberto Denson DO, 2 mg at 02/03/25 1135    nitroglycerin (NITROSTAT) SL tablet 0.4 mg, 0.4 mg, Sublingual, Q5 Min PRN, Jagdish Rabago MD    silver sulfadiazine (SILVADENE, SSD) 1 % cream 1 Application, 1 Application, Topical, Q24H, Alebrto Denson DO    sodium chloride 0.9 % flush 10 mL, 10 mL, Intravenous, Q12H, Jagdish Rabago MD, 10 mL at 02/03/25 0832    sodium chloride 0.9 % flush 10 mL, 10 mL, Intravenous, PRN, Jagdish Rabago MD    sodium chloride 0.9 % infusion 40 mL, 40 mL, Intravenous, PRN, Jagdish Rabago MD    sodium chloride 0.9 % infusion, 75 mL/hr, Intravenous, Continuous, Jagdish Rabago MD, Last Rate: 75 mL/hr at 02/02/25 1959, 75 mL/hr at 02/02/25 1959    Allergies   Allergen Reactions    Codeine      Tylenol 3.    Ibuprofen Diarrhea and Nausea And Vomiting     Contraindicated due to Crohn's Disease     Naproxen Unknown (See Comments)    Ondansetron Hcl Unknown (See Comments)     Makes patient sicker, with nausea and vomiting    Tramadol Unknown (See Comments)    Acetaminophen-Codeine Other (See Comments) and Unknown (See Comments)     GI cramps   GI cramps    Adalimumab Unknown (See Comments)       Review of Systems  Abdominal pain, blood per rectum, diarrhea.    The following portions of the patient's history were reviewed and  updated as appropriate: allergies, current medications, past family history, past medical history, past social history, past surgical history and problem list.    Objective     Vitals:    02/03/25 0323 02/03/25 0458 02/03/25 0706 02/03/25 1108   BP: 106/60  101/65 109/60   BP Location: Left arm  Left arm Left arm   Patient Position: Lying  Lying Lying   Pulse: 106  89    Resp: 18  16 16   Temp: 98.2 °F (36.8 °C)  98.1 °F (36.7 °C) 98.3 °F (36.8 °C)   TempSrc: Oral  Oral Oral   SpO2: 96%  98% 99%   Weight: 63.9 kg (140 lb 14 oz) 63.9 kg (140 lb 14 oz)     Height:           Physical Exam  Constitutional:       General: She is not in acute distress.  HENT:      Mouth/Throat:      Mouth: Mucous membranes are moist.   Eyes:      General: No scleral icterus.  Cardiovascular:      Rate and Rhythm: Normal rate.   Pulmonary:      Effort: Pulmonary effort is normal. No respiratory distress.   Abdominal:      General: There is no distension.      Tenderness: There is abdominal tenderness.   Skin:     Findings: Rash present.   Neurological:      General: No focal deficit present.         Results from last 7 days   Lab Units 02/03/25  1056 02/03/25  0553 02/03/25  0002 02/02/25  2041 02/02/25  1555   SODIUM mmol/L  --  137  --  133* 135*   POTASSIUM mmol/L  --  3.9  --  3.2* 3.5   CHLORIDE mmol/L  --  105  --  102 100   CO2 mmol/L  --  19.3*  --  19.5* 22.5   BUN mg/dL  --  5*  --  7 9   CREATININE mg/dL  --  0.36*  --  0.52* 0.66   CALCIUM mg/dL  --  8.0*  --  7.9* 8.8   ALBUMIN g/dL  --   --   --   --  3.0*   BILIRUBIN mg/dL  --   --   --   --  0.2   ALK PHOS U/L  --   --   --   --  98   ALT (SGPT) U/L  --   --   --   --  7   AST (SGOT) U/L  --   --   --   --  19   GLUCOSE mg/dL  --  107*  --  95 79   WBC 10*3/mm3  --  11.88*  --  9.83 9.62   HEMOGLOBIN g/dL 7.5* 7.1*  7.2* 7.4* 7.2* 8.0*   PLATELETS 10*3/mm3  --  509*  --  499* 526*       Imaging Results (Last 24 Hours)       Procedure Component Value Units Date/Time    CT  "Abdomen Pelvis With Contrast [[id number]] Collected: 02/02/25 1903     Updated: 02/02/25 1904    Narrative:      FINAL REPORT    TECHNIQUE:  null    CLINICAL HISTORY:  Diffuse abdominal pain, concerns for Crohn\"s flare    COMPARISON:  null    FINDINGS:  CT abdomen and pelvis with contrast    Comparison: None    Findings:    No consolidation or effusion.    The liver enhances homogeneously. Gallbladder is unremarkable.    No biliary dilatation. The portal and splenic veins appear patent.    The spleen, pancreas, and adrenal glands are unremarkable.    Both kidneys enhance symmetrically without hydroureteronephrosis.    Circumferential wall thickening of the distal ileum along the right hemipelvis extending to the terminal ileum.    Circumferential wall thickening from the cecum the proximal sigmoid colon with mild pericolonic stranding.    Liquid consistency stool in the colon. Appendix is normal.    No bowel obstruction, pneumoperitoneum, or pneumatosis.    Nonaneurysmal aorta.    No enlarged abdominal or pelvic lymph nodes.    The urinary bladder and uterus are unremarkable.    No pelvic free fluid.    There are no aggressive osseous lesions.      Impression:      IMPRESSION:    1. Inflammatory wall thickening in the distal ileum extending to the terminal ileum, and from the cecum to the proximal sigmoid colon suggesting acute Crohn's disease given patient's history.    2. Underlying liquid consistency stool reflecting diarrhea.    Authenticated and Electronically Signed by Martin John MD on  02/02/2025 07:03:13 PM            Assessment / Plan      Assessment/Recommendations:   Principal Problem:    Crohn's colitis    Need to rule out infectious etiologies.  See stool testing ordered by me.  May have a diet as tolerated.  Agree with steroids.  Would consider a switch to IV methylprednisolone 20mg Q8.  Can wait on that until the stool testing is resulted.  Otherwise, she is established with, or at least was " established with [facility].  Encouraged her to reach out to them so that she may reestablish and resume her Skyrizi.  The patient is agreeable to the above plan.  At this point there is no emergent endoscopic management required.      Thank you very much for letting me participate in the care of this patient.  Please do not hesitate to call me if you have any questions.    Dora Ernst MD  [facility] [place]  2/3/2025  12:38 EST    Part of this note may be an electronic transcription/translation of spoken language to printed text using the Dragon Dictation System.      Electronically signed by Dora Ernst MD at 02/03/25 0328

## 2025-02-05 NOTE — CASE MANAGEMENT/SOCIAL WORK
"Case Management/Social Work    Patient Name:  Shira Toro  YOB: 1990  MRN: [id number]  Admit Date:  2/2/2025        16:46 EST   Discharge Plan       Row Name 02/05/25 1645       Plan    Plan DCP- Pt is requesting a Sober living facility.                1030: CM spoke with pt and father/Josue at bedside. Pt more alert and answers questions coherently. DCP discussed and pt states \"I have no home to return to.\" Father states \"I live in a government assist housing and they will not allow over certain days a year for extra family to stay\"  Pt is requesting a sober living facility.         Electronically signed by:  Jovanna Schafer RN  02/05/25 16:46 EST        "

## 2025-02-06 LAB
ANION GAP SERPL CALCULATED.3IONS-SCNC: 7.2 MMOL/L (ref 5–15)
BUN SERPL-MCNC: 10 MG/DL (ref 6–20)
BUN/CREAT SERPL: 21.3 (ref 7–25)
CALCIUM SPEC-SCNC: 8 MG/DL (ref 8.6–10.5)
CHLORIDE SERPL-SCNC: 108 MMOL/L (ref 98–107)
CO2 SERPL-SCNC: 24.8 MMOL/L (ref 22–29)
CREAT SERPL-MCNC: 0.47 MG/DL (ref 0.57–1)
DEPRECATED RDW RBC AUTO: 43.8 FL (ref 37–54)
EGFRCR SERPLBLD CKD-EPI 2021: 129.9 ML/MIN/1.73
ERYTHROCYTE [DISTWIDTH] IN BLOOD BY AUTOMATED COUNT: 14.9 % (ref 12.3–15.4)
GLUCOSE SERPL-MCNC: 77 MG/DL (ref 65–99)
HCT VFR BLD AUTO: 27.9 % (ref 34–46.6)
HGB BLD-MCNC: 8.2 G/DL (ref 12–15.9)
MCH RBC QN AUTO: 23.9 PG (ref 26.6–33)
MCHC RBC AUTO-ENTMCNC: 29.4 G/DL (ref 31.5–35.7)
MCV RBC AUTO: 81.3 FL (ref 79–97)
PLATELET # BLD AUTO: 434 10*3/MM3 (ref 140–450)
PMV BLD AUTO: 8.4 FL (ref 6–12)
POTASSIUM SERPL-SCNC: 3.9 MMOL/L (ref 3.5–5.2)
RBC # BLD AUTO: 3.43 10*6/MM3 (ref 3.77–5.28)
SODIUM SERPL-SCNC: 140 MMOL/L (ref 136–145)
WBC NRBC COR # BLD AUTO: 8.4 10*3/MM3 (ref 3.4–10.8)

## 2025-02-06 PROCEDURE — 99232 SBSQ HOSP IP/OBS MODERATE 35: CPT | Performed by: INTERNAL MEDICINE

## 2025-02-06 PROCEDURE — 85027 COMPLETE CBC AUTOMATED: CPT | Performed by: INTERNAL MEDICINE

## 2025-02-06 PROCEDURE — 25010000002 METHYLPREDNISOLONE PER 125 MG: Performed by: STUDENT IN AN ORGANIZED HEALTH CARE EDUCATION/TRAINING PROGRAM

## 2025-02-06 PROCEDURE — 80048 BASIC METABOLIC PNL TOTAL CA: CPT | Performed by: INTERNAL MEDICINE

## 2025-02-06 PROCEDURE — 25010000002 MORPHINE PER 10 MG: Performed by: INTERNAL MEDICINE

## 2025-02-06 PROCEDURE — 25010000002 HEPARIN (PORCINE) PER 1000 UNITS: Performed by: INTERNAL MEDICINE

## 2025-02-06 RX ADMIN — HYDROCORTISONE 1 APPLICATION: 25 CREAM TOPICAL at 21:52

## 2025-02-06 RX ADMIN — Medication 10 ML: at 10:16

## 2025-02-06 RX ADMIN — HEPARIN SODIUM 5000 UNITS: 5000 INJECTION INTRAVENOUS; SUBCUTANEOUS at 21:52

## 2025-02-06 RX ADMIN — METHYLPREDNISOLONE SODIUM SUCCINATE 60 MG: 125 INJECTION, POWDER, FOR SOLUTION INTRAMUSCULAR; INTRAVENOUS at 10:15

## 2025-02-06 RX ADMIN — HEPARIN SODIUM 5000 UNITS: 5000 INJECTION INTRAVENOUS; SUBCUTANEOUS at 10:15

## 2025-02-06 RX ADMIN — PANTOPRAZOLE SODIUM 40 MG: 40 TABLET, DELAYED RELEASE ORAL at 06:11

## 2025-02-06 RX ADMIN — MORPHINE SULFATE 2 MG: 2 INJECTION, SOLUTION INTRAMUSCULAR; INTRAVENOUS at 06:15

## 2025-02-06 RX ADMIN — SILVER SULFADIAZINE 1 APPLICATION: 10 CREAM TOPICAL at 10:16

## 2025-02-06 RX ADMIN — FOLIC ACID 1000 MCG: 1 TABLET ORAL at 10:15

## 2025-02-06 RX ADMIN — HYDROCORTISONE 1 APPLICATION: 25 CREAM TOPICAL at 10:16

## 2025-02-06 RX ADMIN — DULOXETINE HYDROCHLORIDE 60 MG: 30 CAPSULE, DELAYED RELEASE ORAL at 06:11

## 2025-02-06 RX ADMIN — MORPHINE SULFATE 2 MG: 2 INJECTION, SOLUTION INTRAMUSCULAR; INTRAVENOUS at 22:58

## 2025-02-06 RX ADMIN — ACETAMINOPHEN 650 MG: 325 TABLET, FILM COATED ORAL at 06:15

## 2025-02-06 RX ADMIN — BUPROPION HYDROCHLORIDE 150 MG: 150 TABLET, FILM COATED, EXTENDED RELEASE ORAL at 10:15

## 2025-02-06 RX ADMIN — MORPHINE SULFATE 2 MG: 2 INJECTION, SOLUTION INTRAMUSCULAR; INTRAVENOUS at 17:59

## 2025-02-06 RX ADMIN — Medication 10 ML: at 21:52

## 2025-02-06 RX ADMIN — MORPHINE SULFATE 2 MG: 2 INJECTION, SOLUTION INTRAMUSCULAR; INTRAVENOUS at 12:08

## 2025-02-06 RX ADMIN — BUPROPION HYDROCHLORIDE 150 MG: 150 TABLET, FILM COATED, EXTENDED RELEASE ORAL at 21:52

## 2025-02-06 RX ADMIN — QUETIAPINE FUMARATE 50 MG: 25 TABLET ORAL at 21:52

## 2025-02-06 NOTE — PROGRESS NOTES
"Dietitian Follow-up    Patient Name: Shira Toro  YOB: 1990  MRN: [id number]  Admission date: 2/2/2025    Comment:      Clinical Nutrition Follow-up   Encounter Information        Trending Narrative     2/6: Average PO intake 68% x 4 meals. Will provide Boost Breeze BID.     2/4: Pt is on GI soft to chew diet. Average PO intake 62% x 2 meals.     2/3: Pt admitted d/t Crohn's colitis. Currently NPO. EMR does not show any recent wt changes. Will monitor for diet advancement and need for ONS.      Anthropometrics        Current Height, Weight Height: 152.4 cm (60\")  Weight: 67.8 kg (149 lb 7.6 oz) (02/06/25 0600)       Trending Weight Hx     This admission:              PTA:     Wt Readings from Last 30 Encounters:   02/06/25 0600 67.8 kg (149 lb 7.6 oz)   02/05/25 0600 68.1 kg (150 lb 2.1 oz)   02/04/25 0500 94.2 kg (207 lb 10.8 oz)   02/03/25 0458 63.9 kg (140 lb 14 oz)   02/03/25 0323 63.9 kg (140 lb 14 oz)   02/02/25 2130 63.9 kg (140 lb 14 oz)   02/02/25 1411 65.8 kg (145 lb)   09/12/24 1209 65.8 kg (145 lb)   08/06/24 2250 65.3 kg (143 lb 15.4 oz)   08/04/23 1121 65.3 kg (144 lb)   07/28/23 1535 64.9 kg (143 lb)   10/21/22 1428 65.8 kg (145 lb)   05/15/20 2210 56.9 kg (125 lb 6.4 oz)   06/22/19 2032 95.3 kg (210 lb)   08/09/18 1017 77.1 kg (170 lb)   11/16/17 1855 70.3 kg (155 lb)      BMI kg/m2 Body mass index is 29.19 kg/m².     Labs        Pertinent Labs Results from last 7 days   Lab Units 02/06/25  0559 02/05/25  0759 02/04/25 0620 02/02/25 2041 02/02/25  1555   SODIUM mmol/L 140 142 140   < > 135*   POTASSIUM mmol/L 3.9 3.9 4.2   < > 3.5   CHLORIDE mmol/L 108* 109* 108*   < > 100   CO2 mmol/L 24.8 27.0 22.8   < > 22.5   BUN mg/dL 10 14 11   < > 9   CREATININE mg/dL 0.47* 0.54* 0.54*   < > 0.66   CALCIUM mg/dL 8.0* 7.8* 8.0*   < > 8.8   BILIRUBIN mg/dL  --   --   --   --  0.2   ALK PHOS U/L  --   --   --   --  98   ALT (SGPT) U/L  --   --   --   --  7   AST (SGOT) U/L  --   --   --   --  " 19   GLUCOSE mg/dL 77 91 96   < > 79    < > = values in this interval not displayed.     Results from last 7 days   Lab Units 02/06/25  0559 02/02/25 2041 02/02/25  1555   MAGNESIUM mg/dL  --   --  2.0   HEMOGLOBIN g/dL 8.2*   < > 8.0*   HEMATOCRIT % 27.9*   < > 25.8*    < > = values in this interval not displayed.         Medications    Scheduled Medications acetaminophen, 650 mg, Oral, Once  buPROPion SR, 150 mg, Oral, BID  DULoxetine, 60 mg, Oral, QAM  folic acid, 1,000 mcg, Oral, Daily  heparin (porcine), 5,000 Units, Subcutaneous, Q12H  hydrocortisone, 1 Application, Topical, Q12H  methylPREDNISolone sodium succinate, 60 mg, Intravenous, Daily  pantoprazole, 40 mg, Oral, Q AM  QUEtiapine, 50 mg, Oral, Nightly  silver sulfadiazine, 1 Application, Topical, Q24H  sodium chloride, 10 mL, Intravenous, Q12H        Infusions      PRN Medications   acetaminophen    Morphine    nitroglycerin    sodium chloride    sodium chloride     Physical Findings        Trending Physical   Appearance, NFPE    --  Edema  not assessed   Bowel Function LBM: 2/5   Tubes Peripheral IV   Chewing/Swallowing WNL   Skin Wounds noted   --  Current Nutrition Orders & Evaluation of Intake       Oral Nutrition     Food Allergies NKFA   Current PO Diet Diet: Gastrointestinal; Fiber-Restricted, Low Irritant; Texture: Soft to Chew (NDD 3); Soft to Chew: Whole Meat; Fluid Consistency: Thin (IDDSI 0)   Supplement No active supplement orders     PO Evaluation     Trending % PO Intake 2/6: 68% x 2 meals  2/4: 62% x 2 meals     Nutrition Diagnosis         Nutrition Dx Problem 1 Altered GI function r/t Crohn's colitis AEB GI diet and CT of abdomen.       Nutrition Dx Problem 2        Intervention Goal         Intervention Goal(s) Diet advancement per MD  No significant wt changes  PO intake meet >50% of estimated needs     Nutrition Intervention        RD Action Supplement provided     Nutrition Prescription          Diet Prescription Diet:  Gastrointestinal; Fiber-Restricted, Low Irritant; Texture: Soft to Chew (NDD 3); Soft to Chew: Whole Meat; Fluid Consistency: Thin (IDDSI 0)   Supplement Prescription Orders Placed This Encounter      Dietary Nutrition Supplements Boost Breeze (Ensure Clear)     Enteral Nutrition Prescription     TPN Prescription       Monitor/Evaluation        Monitor Per protocol, I&O, PO intake, Pertinent labs, Weight, Skin status, GI status, Symptoms, POC/GOC     RD to f/up    Electronically signed by:  Belinda Adkins RD  02/06/25 10:49 EST

## 2025-02-06 NOTE — CASE MANAGEMENT/SOCIAL WORK
Spoke  to pt she was sleepy but was parminder to to answer questions  is planning on staying with her father for a few months declining information on homeless shelters

## 2025-02-06 NOTE — PROGRESS NOTES
In Patient Consult      Date of Consultation: 2025  Patient Name: Shira Toro  MRN: [id number]  : 1990     Referring provider: Alberto Denson DO    Primary care provider:  Gin Yoo APRN    Reason for consultation:   Crohn's colitis  Diarrhea  Abdominal pain  Norovirus    Interval history:   Feels better after steroids.  Has been tolerating p.o. diet.    Subjective     Past Medical History:   Diagnosis Date    Anemia     Anxiety     Arthritis     Autoimmune disease     chrohns related.     Crohn's disease     Fibromyalgia     Substance abuse        Past Surgical History:   Procedure Laterality Date    COLONOSCOPY      URACHUS EXCISION      urachus rupture as a baby       Family History   Problem Relation Age of Onset    Coronary artery disease Mother     Heart attack Mother     Hypertension Mother     Hypertension Brother        Social History     Socioeconomic History    Marital status: Single   Tobacco Use    Smoking status: Former     Current packs/day: 0.50     Types: Cigarettes     Passive exposure: Past    Smokeless tobacco: Never   Vaping Use    Vaping status: Every Day    Substances: Nicotine, Flavoring    Devices: Refillable tank    Passive vaping exposure: Yes   Substance and Sexual Activity    Alcohol use: Yes     Comment: occasionally    Drug use: Yes     Types: Heroin, Marijuana, Methamphetamines     Comment: medical marijuana card per pt    Sexual activity: Yes     Partners: Male     Birth control/protection: Nexplanon         Current Facility-Administered Medications:     acetaminophen (TYLENOL) tablet 650 mg, 650 mg, Oral, Once, Jared Porras MD    acetaminophen (TYLENOL) tablet 650 mg, 650 mg, Oral, Q6H PRN, Jagdish Rabago MD, 650 mg at 25 0615    buPROPion SR (WELLBUTRIN SR) 12 hr tablet 150 mg, 150 mg, Oral, BID, Alberto Denson DO, 150 mg at 25    DULoxetine (CYMBALTA) DR capsule 60 mg, 60 mg, Oral, QAM, Alberto Denson DO, 60 mg  at 02/06/25 0611    folic acid (FOLVITE) tablet 1,000 mcg, 1,000 mcg, Oral, Daily, Alberto Denson DO, 1,000 mcg at 02/05/25 0831    heparin (porcine) 5000 UNIT/ML injection 5,000 Units, 5,000 Units, Subcutaneous, Q12H, Alberto Denson DO, 5,000 Units at 02/05/25 2018    hydrocortisone 2.5 % cream 1 Application, 1 Application, Topical, Q12H, Alberto Denson DO, 1 Application at 02/05/25 2019    methylPREDNISolone sodium succinate (SOLU-Medrol) injection 60 mg, 60 mg, Intravenous, Daily, Jagdish Rabago MD, 60 mg at 02/05/25 0830    Morphine sulfate (PF) injection 2 mg, 2 mg, Intravenous, Q4H PRN, Alberto Denson DO, 2 mg at 02/06/25 0615    nitroglycerin (NITROSTAT) SL tablet 0.4 mg, 0.4 mg, Sublingual, Q5 Min PRN, Jagdish Rabago MD    pantoprazole (PROTONIX) EC tablet 40 mg, 40 mg, Oral, Q AM, Alberto Denson DO, 40 mg at 02/06/25 0611    QUEtiapine (SEROquel) tablet 50 mg, 50 mg, Oral, Nightly, Alberto Denson DO, 50 mg at 02/05/25 2018    silver sulfadiazine (SILVADENE, SSD) 1 % cream 1 Application, 1 Application, Topical, Q24H, Alberto Denson DO, 1 Application at 02/05/25 1709    sodium chloride 0.9 % flush 10 mL, 10 mL, Intravenous, Q12H, Jagdish Rabago MD, 10 mL at 02/05/25 2019    sodium chloride 0.9 % flush 10 mL, 10 mL, Intravenous, PRN, Jagdish Rabago MD    sodium chloride 0.9 % infusion 40 mL, 40 mL, Intravenous, PRN, Jagdish Rabago MD    Allergies   Allergen Reactions    Codeine      Tylenol 3.    Ibuprofen Diarrhea and Nausea And Vomiting     Contraindicated due to Crohn's Disease     Naproxen Unknown (See Comments)    Ondansetron Hcl Unknown (See Comments)     Makes patient sicker, with nausea and vomiting    Tramadol Unknown (See Comments)    Acetaminophen-Codeine Other (See Comments) and Unknown (See Comments)     GI cramps   GI cramps    Adalimumab Unknown (See Comments)       Review of Systems  See HPI above.  Otherwise negative.    The following portions of the patient's history were  reviewed and updated as appropriate: allergies, current medications, past family history, past medical history, past social history, past surgical history and problem list.    Objective     Vitals:    02/05/25 2322 02/06/25 0317 02/06/25 0600 02/06/25 0701   BP: 91/55 97/57  95/60   BP Location: Left arm Left arm  Left arm   Patient Position: Lying Lying  Lying   Pulse: 87 91  81   Resp: 18 18  18   Temp: 97.9 °F (36.6 °C) 98.2 °F (36.8 °C)  98 °F (36.7 °C)   TempSrc: Oral Oral  Oral   SpO2:    95%   Weight:   67.8 kg (149 lb 7.6 oz)    Height:           Physical Exam  Constitutional:       General: She is not in acute distress.  HENT:      Head: Normocephalic and atraumatic.   Eyes:      General: No scleral icterus.     Conjunctiva/sclera: Conjunctivae normal.   Cardiovascular:      Rate and Rhythm: Normal rate.   Pulmonary:      Effort: Pulmonary effort is normal. No respiratory distress.   Musculoskeletal:         General: No deformity or signs of injury.   Skin:     Coloration: Skin is not jaundiced or pale.      Findings: Lesion and rash present.   Neurological:      General: No focal deficit present.      Mental Status: She is alert and oriented to person, place, and time.   Psychiatric:         Mood and Affect: Mood normal.         Behavior: Behavior normal.         Results from last 7 days   Lab Units 02/06/25  0559 02/05/25  0759 02/04/25  1304 02/04/25  0620 02/02/25  2041 02/02/25  1555   SODIUM mmol/L 140 142  --  140   < > 135*   POTASSIUM mmol/L 3.9 3.9  --  4.2   < > 3.5   CHLORIDE mmol/L 108* 109*  --  108*   < > 100   CO2 mmol/L 24.8 27.0  --  22.8   < > 22.5   BUN mg/dL 10 14  --  11   < > 9   CREATININE mg/dL 0.47* 0.54*  --  0.54*   < > 0.66   CALCIUM mg/dL 8.0* 7.8*  --  8.0*   < > 8.8   ALBUMIN g/dL  --   --   --   --   --  3.0*   BILIRUBIN mg/dL  --   --   --   --   --  0.2   ALK PHOS U/L  --   --   --   --   --  98   ALT (SGPT) U/L  --   --   --   --   --  7   AST (SGOT) U/L  --   --   --    --   --  19   GLUCOSE mg/dL 77 91  --  96   < > 79   WBC 10*3/mm3 8.40 9.76  --  10.11   < > 9.62   HEMOGLOBIN g/dL 8.2* 8.2* 8.8* 8.4*  8.4*   < > 8.0*   PLATELETS 10*3/mm3 434 488*  --  515*   < > 526*    < > = values in this interval not displayed.       Imaging Results (Last 24 Hours)       ** No results found for the last 24 hours. **             Assessment / Plan      Assessment/Recommendations:  Principal Problem:    Crohn's colitis  Diarrhea  Abdominal pain  Norovirus    Continue IV steroids for Crohn's flare.  On discharge, suggest that she go home on a p.o. prednisone taper.  Alternatively, can also try budesonide p.o.  Continue supportive care, IVF.  GI pathogen panel was positive for norovirus.  Suspect that that is the reason for the diarrhea  Outpatient GI follow-up to resume her biologic therapy.  Patient is [facility] with [facility].  No inpatient endoscopic evaluation needed at this time.    Thank you very much for letting me participate in the care of this patient.  Please do not hesitate to call me if you have any questions.    Dora Ernst MD   [facility] [place]  2/6/2025  08:57 EST    Part of this note may be an electronic transcription/translation of spoken language to printed text using the Dragon Dictation System.

## 2025-02-06 NOTE — PLAN OF CARE
Problem: Adult Inpatient Plan of Care  Goal: Plan of Care Review  Outcome: Progressing  Goal: Patient-Specific Goal (Individualized)  Outcome: Progressing  Goal: Absence of Hospital-Acquired Illness or Injury  Outcome: Progressing  Intervention: Identify and Manage Fall Risk  Recent Flowsheet Documentation  Taken 2/6/2025 0200 by Samina Patrick RN  Safety Promotion/Fall Prevention:   safety round/check completed   nonskid shoes/slippers when out of bed   clutter free environment maintained   assistive device/personal items within reach  Taken 2/6/2025 0000 by Samina Patrick RN  Safety Promotion/Fall Prevention:   safety round/check completed   nonskid shoes/slippers when out of bed   clutter free environment maintained   assistive device/personal items within reach  Taken 2/5/2025 2200 by Samina Patrick RN  Safety Promotion/Fall Prevention:   safety round/check completed   nonskid shoes/slippers when out of bed   clutter free environment maintained   assistive device/personal items within reach  Taken 2/5/2025 2000 by Samina Patrick RN  Safety Promotion/Fall Prevention:   safety round/check completed   nonskid shoes/slippers when out of bed   clutter free environment maintained   assistive device/personal items within reach  Intervention: Prevent Skin Injury  Recent Flowsheet Documentation  Taken 2/6/2025 0200 by Samina Patrick RN  Body Position:   tilted   right   legs elevated   position changed independently  Taken 2/6/2025 0000 by Samina Patrick RN  Body Position:   supine   legs elevated   position changed independently  Taken 2/5/2025 2200 by Samina Patrick RN  Body Position:   tilted   left   legs elevated  Taken 2/5/2025 2000 by Samina Patrick RN  Body Position:   sitting up in bed   legs elevated   position changed independently  Skin Protection: transparent dressing maintained  Intervention: Prevent and Manage VTE (Venous Thromboembolism) Risk  Recent Flowsheet  Documentation  Taken 2/5/2025 2000 by Samina Patrick RN  VTE Prevention/Management: (See MAR, heparin)   bilateral   SCDs (sequential compression devices) off   patient refused intervention  Intervention: Prevent Infection  Recent Flowsheet Documentation  Taken 2/6/2025 0200 by Samina Patrick RN  Infection Prevention:   environmental surveillance performed   rest/sleep promoted   single patient room provided  Taken 2/6/2025 0000 by Samina Patrick RN  Infection Prevention:   environmental surveillance performed   rest/sleep promoted   single patient room provided  Taken 2/5/2025 2200 by Samina Patrick RN  Infection Prevention:   environmental surveillance performed   rest/sleep promoted   single patient room provided  Taken 2/5/2025 2000 by Samina Patrick RN  Infection Prevention:   environmental surveillance performed   rest/sleep promoted   single patient room provided  Goal: Optimal Comfort and Wellbeing  Outcome: Progressing  Intervention: Provide Person-Centered Care  Recent Flowsheet Documentation  Taken 2/5/2025 2000 by Samina Patrick RN  Trust Relationship/Rapport:   care explained   choices provided   emotional support provided   empathic listening provided   questions answered   questions encouraged   reassurance provided   thoughts/feelings acknowledged  Goal: Readiness for Transition of Care  Outcome: Progressing     Problem: Skin Injury Risk Increased  Goal: Skin Health and Integrity  Outcome: Progressing  Intervention: Optimize Skin Protection  Recent Flowsheet Documentation  Taken 2/6/2025 0200 by Samina Patrick RN  Activity Management: activity minimized  Head of Bed (HOB) Positioning: HOB at 30-45 degrees  Taken 2/6/2025 0000 by Samina Patrick RN  Activity Management: activity minimized  Head of Bed (HOB) Positioning: HOB at 30-45 degrees  Taken 2/5/2025 2200 by Samina Patrick RN  Activity Management: activity minimized  Head of Bed (HOB) Positioning: HOB  at 30-45 degrees  Taken 2/5/2025 2000 by Samina Patrick, RN  Activity Management: activity encouraged  Pressure Reduction Techniques: frequent weight shift encouraged  Head of Bed (HOB) Positioning: HOB elevated  Pressure Reduction Devices: positioning supports utilized  Skin Protection: transparent dressing maintained     Problem: Pain Acute  Goal: Optimal Pain Control and Function  Outcome: Progressing  Intervention: Optimize Psychosocial Wellbeing  Recent Flowsheet Documentation  Taken 2/5/2025 2000 by Samina Patrick RN  Diversional Activities: television  Intervention: Prevent or Manage Pain  Recent Flowsheet Documentation  Taken 2/5/2025 2000 by Samina Patrick, RN  Sensory Stimulation Regulation: television on  Medication Review/Management: medications reviewed   Goal Outcome Evaluation:      VSS on RA.  A/Ox4.  No acute events during shift.  No further complaints from patient at this time.  POC ongoing.  D/C pending.

## 2025-02-06 NOTE — CASE MANAGEMENT/SOCIAL WORK
Case Management/Social Work    Patient Name:  Shira Toro  YOB: 1990  MRN: [id number]  Admit Date:  2/2/2025        15:41 EST   Discharge Plan       Row Name 02/06/25 1539       Plan    Plan DCP to stay with father for a few months                        Electronically signed by:  Norma Luis RN  02/06/25 15:41 EST

## 2025-02-06 NOTE — PROGRESS NOTES
[facility]    PROGRESS NOTE    Name:  Shira Toro   Age:  34 y.o.  Sex:  female  :  1990  MRN:  [id number]   Visit Number:  [id number]  Admission Date:  2025  Date Of Service:  25  Primary Care Physician:  Gin Yoo APRN     LOS: 4 days :    Chief Complaint:      Crohn's flare    Subjective:    Patient examined this morning. Resting comfortably.  Eating lunch.  Abdominal pain improving.  Diarrhea improving.  No acute events overnight.    Hospital Course:     This is a 34-year-old female with a past medical history of substance abuse, fibromyalgia, anxiety, depression, GERD, and Crohn's disease presenting with abdominal pain.   In ED, blood pressure 112/75, heart rate 131, respiratory rate 16, temperature 97.8 and O2 saturation 99% on room air. Labs on arrival showed sodium 135, potassium 3.5, BUN 9, creatinine 0.6, CRP 5.08, lactic acid 5.5, WBC 9.6, hemoglobin 8, platelet count 426. CT abdomen showed inflammatory wall thickening in the distal ileum extending to the terminal ileum and from the cecum to the proximal sigmoid colon suggesting acute Crohn's disease.     Review of Systems:     All systems were reviewed and negative except as mentioned in subjective, assessment and plan.    Vital Signs:    Temp:  [97.4 °F (36.3 °C)-98.2 °F (36.8 °C)] 97.6 °F (36.4 °C)  Heart Rate:  [80-99] 90  Resp:  [16-18] 16  BP: ()/(55-84) 102/70    Intake and output:    I/O last 3 completed shifts:  In: 120 [P.O.:120]  Out: -   I/O this shift:  In: 240 [P.O.:240]  Out: -     Physical Examination: Examined again 2025    General Appearance:  Alert, chronically ill appearing. NAD   Head:  Atraumatic and normocephalic.   Eyes: Conjunctivae and sclerae normal, no icterus. No pallor.   Throat: No oral lesions, no thrush, oral mucosa moist.   Neck: Supple, trachea midline, no thyromegaly.   Lungs:   Non-labored breathing   Heart:  Normal S1 and S2, no murmur, No JVD.  "  Abdomen:   Normal bowel sounds, Soft, nontender, nondistended, no rebound tenderness.   Extremities: Supple, no edema, no cyanosis, no clubbing.   Skin: Wound superficial, circumferential wounds on torso and all extremities. Large ulcerations to bilateral lower extremities.    Neurologic: Alert and oriented x 3.      Laboratory results:    Results from last 7 days   Lab Units 02/06/25  0559 02/05/25  0759 02/04/25  0620 02/02/25  2041 02/02/25  1555   SODIUM mmol/L 140 142 140   < > 135*   POTASSIUM mmol/L 3.9 3.9 4.2   < > 3.5   CHLORIDE mmol/L 108* 109* 108*   < > 100   CO2 mmol/L 24.8 27.0 22.8   < > 22.5   BUN mg/dL 10 14 11   < > 9   CREATININE mg/dL 0.47* 0.54* 0.54*   < > 0.66   CALCIUM mg/dL 8.0* 7.8* 8.0*   < > 8.8   BILIRUBIN mg/dL  --   --   --   --  0.2   ALK PHOS U/L  --   --   --   --  98   ALT (SGPT) U/L  --   --   --   --  7   AST (SGOT) U/L  --   --   --   --  19   GLUCOSE mg/dL 77 91 96   < > 79    < > = values in this interval not displayed.     Results from last 7 days   Lab Units 02/06/25  0559 02/05/25  0759 02/04/25  1304 02/04/25  0620   WBC 10*3/mm3 8.40 9.76  --  10.11   HEMOGLOBIN g/dL 8.2* 8.2* 8.8* 8.4*  8.4*   HEMATOCRIT % 27.9* 26.9* 29.1* 26.9*  27.2*   PLATELETS 10*3/mm3 434 488*  --  515*         Results from last 7 days   Lab Units 02/02/25  1555   HSTROP T ng/L <6         No results for input(s): \"PHART\", \"SDU0GDA\", \"PO2ART\", \"EKD5XXL\", \"BASEEXCESS\" in the last 8760 hours.   I have reviewed the patient's laboratory results.    Radiology results:    No radiology results from the last 24 hrs  I have reviewed the patient's radiology reports.    Medication Review:     I have reviewed the patient's active and prn medications.     Problem List:      Crohn's colitis      Assessment:    Acute Crohn's Flare  Hematochezia  Polysubstance abuse  Anxiety/Depression  GERD    Plan:    Acute Crohn's Flare  Norovirus  GI consulted  Continue steroids  Continue IVF  GI PCR panel positive for " norovirus  Monitor H&H; status post 1 unit PRBC  Polysubstance abuse   UDS + for methamphetamine  Counseled on cessation  Consider  consult once medically stable  Multiple wounds  Present on admission  Wound care consult  Possible leukocytoclastic vasculitis.  Hopeful will improve with steroids.    Patient will ultimately need to follow up with established GI at  upon discharge and resume her Skyrizi.   Further orders as clinical course dictates.   Plan of care reviewed from 2/5/25.  No changes    DVT Prophylaxis: Heparin sq  Code Status: Full  Diet: As tolerated  Discharge Plan: Discharge tomorrow    Alberto Denson DO  02/06/25  15:48 EST    Dictated utilizing Dragon dictation.

## 2025-02-06 NOTE — PLAN OF CARE
Goal Outcome Evaluation:   No acute events this shift. POC ongoing, plan for d/c tomorrow per Dr. Denson.

## 2025-02-07 VITALS
RESPIRATION RATE: 18 BRPM | HEART RATE: 88 BPM | DIASTOLIC BLOOD PRESSURE: 57 MMHG | HEIGHT: 60 IN | BODY MASS INDEX: 29.61 KG/M2 | WEIGHT: 150.79 LBS | OXYGEN SATURATION: 96 % | SYSTOLIC BLOOD PRESSURE: 98 MMHG | TEMPERATURE: 98 F

## 2025-02-07 PROCEDURE — 25010000002 HEPARIN (PORCINE) PER 1000 UNITS: Performed by: INTERNAL MEDICINE

## 2025-02-07 PROCEDURE — 99239 HOSP IP/OBS DSCHRG MGMT >30: CPT | Performed by: INTERNAL MEDICINE

## 2025-02-07 PROCEDURE — 25010000002 METHYLPREDNISOLONE PER 125 MG: Performed by: STUDENT IN AN ORGANIZED HEALTH CARE EDUCATION/TRAINING PROGRAM

## 2025-02-07 RX ORDER — BUDESONIDE 3 MG/1
6 CAPSULE, COATED PELLETS ORAL EVERY MORNING
Qty: 28 CAPSULE | Refills: 0 | Status: SHIPPED | OUTPATIENT
Start: 2025-02-07 | End: 2025-02-21

## 2025-02-07 RX ADMIN — DULOXETINE HYDROCHLORIDE 60 MG: 30 CAPSULE, DELAYED RELEASE ORAL at 06:07

## 2025-02-07 RX ADMIN — FOLIC ACID 1000 MCG: 1 TABLET ORAL at 09:55

## 2025-02-07 RX ADMIN — ACETAMINOPHEN 650 MG: 325 TABLET, FILM COATED ORAL at 06:07

## 2025-02-07 RX ADMIN — Medication 10 ML: at 09:56

## 2025-02-07 RX ADMIN — BUPROPION HYDROCHLORIDE 150 MG: 150 TABLET, FILM COATED, EXTENDED RELEASE ORAL at 09:55

## 2025-02-07 RX ADMIN — HYDROCORTISONE 1 APPLICATION: 25 CREAM TOPICAL at 09:56

## 2025-02-07 RX ADMIN — METHYLPREDNISOLONE SODIUM SUCCINATE 60 MG: 125 INJECTION, POWDER, FOR SOLUTION INTRAMUSCULAR; INTRAVENOUS at 09:55

## 2025-02-07 RX ADMIN — SILVER SULFADIAZINE 1 APPLICATION: 10 CREAM TOPICAL at 09:56

## 2025-02-07 RX ADMIN — PANTOPRAZOLE SODIUM 40 MG: 40 TABLET, DELAYED RELEASE ORAL at 06:08

## 2025-02-07 NOTE — CASE MANAGEMENT/SOCIAL WORK
Case Management Discharge Note      Final Note: DC home with father to transport. Antibiotics ordered for DC.    Provided Post Acute Provider List?: N/A    Selected Continued Care - Discharged on 2/7/2025 Admission date: 2/2/2025 - Discharge disposition: Home or Self Care      Destination    No services have been selected for the patient.                Durable Medical Equipment    No services have been selected for the patient.                Dialysis/Infusion    No services have been selected for the patient.                Home Medical Care    No services have been selected for the patient.                Therapy    No services have been selected for the patient.                Community Resources    No services have been selected for the patient.                Community & DME    No services have been selected for the patient.                    Transportation Services  Private: Car    Final Discharge Disposition Code: 01 - home or self-care

## 2025-02-07 NOTE — PLAN OF CARE
Problem: Adult Inpatient Plan of Care  Goal: Plan of Care Review  Outcome: Progressing  Goal: Patient-Specific Goal (Individualized)  Outcome: Progressing  Goal: Absence of Hospital-Acquired Illness or Injury  Outcome: Progressing  Intervention: Identify and Manage Fall Risk  Recent Flowsheet Documentation  Taken 2/7/2025 0200 by Samina Patrick RN  Safety Promotion/Fall Prevention:   safety round/check completed   nonskid shoes/slippers when out of bed   clutter free environment maintained   assistive device/personal items within reach  Taken 2/7/2025 0000 by Samina Patrick RN  Safety Promotion/Fall Prevention:   safety round/check completed   nonskid shoes/slippers when out of bed   clutter free environment maintained   assistive device/personal items within reach  Taken 2/6/2025 2200 by Samina Patrick RN  Safety Promotion/Fall Prevention:   safety round/check completed   nonskid shoes/slippers when out of bed   clutter free environment maintained   assistive device/personal items within reach  Taken 2/6/2025 2000 by Samina Patrick RN  Safety Promotion/Fall Prevention:   safety round/check completed   nonskid shoes/slippers when out of bed   clutter free environment maintained   assistive device/personal items within reach  Intervention: Prevent Skin Injury  Recent Flowsheet Documentation  Taken 2/7/2025 0200 by Samina Patrick RN  Body Position:   supine   legs elevated  Taken 2/7/2025 0000 by Samina Patrick RN  Body Position:   side-lying   legs elevated   position changed independently  Taken 2/6/2025 2200 by Samina Patrick RN  Body Position:   tilted   right   legs elevated   position changed independently  Taken 2/6/2025 2000 by Samina Patrick RN  Body Position:   sitting up in bed   legs elevated   position changed independently  Skin Protection: transparent dressing maintained  Intervention: Prevent and Manage VTE (Venous Thromboembolism) Risk  Recent Flowsheet  Documentation  Taken 2/6/2025 2000 by Samina Patrick RN  VTE Prevention/Management: (see MAR)   bilateral   SCDs (sequential compression devices) off   other (see comments)  Intervention: Prevent Infection  Recent Flowsheet Documentation  Taken 2/7/2025 0200 by Samina Patrick RN  Infection Prevention:   environmental surveillance performed   single patient room provided   rest/sleep promoted  Taken 2/7/2025 0000 by Samina Patrick RN  Infection Prevention:   environmental surveillance performed   single patient room provided   rest/sleep promoted  Taken 2/6/2025 2200 by Samina Patrick RN  Infection Prevention:   environmental surveillance performed   rest/sleep promoted   single patient room provided  Taken 2/6/2025 2000 by Samina Patrick RN  Infection Prevention: environmental surveillance performed  Goal: Optimal Comfort and Wellbeing  Outcome: Progressing  Intervention: Monitor Pain and Promote Comfort  Recent Flowsheet Documentation  Taken 2/6/2025 2258 by Samina Patrick RN  Pain Management Interventions: pain medication given  Goal: Readiness for Transition of Care  Outcome: Progressing     Problem: Skin Injury Risk Increased  Goal: Skin Health and Integrity  Outcome: Progressing  Intervention: Optimize Skin Protection  Recent Flowsheet Documentation  Taken 2/7/2025 0200 by Samina Patrick RN  Activity Management: activity minimized  Head of Bed (HOB) Positioning: HOB at 20-30 degrees  Taken 2/7/2025 0000 by Samina Patrick RN  Activity Management: activity minimized  Head of Bed (HOB) Positioning: HOB lowered  Taken 2/6/2025 2200 by Samina Patrick RN  Activity Management: activity minimized  Head of Bed (HOB) Positioning: HOB at 30-45 degrees  Taken 2/6/2025 2000 by Samina Patrick RN  Activity Management: activity encouraged  Pressure Reduction Techniques: frequent weight shift encouraged  Head of Bed (HOB) Positioning: HOB elevated  Pressure Reduction Devices:  positioning supports utilized  Skin Protection: transparent dressing maintained     Problem: Pain Acute  Goal: Optimal Pain Control and Function  Outcome: Progressing  Intervention: Optimize Psychosocial Wellbeing  Recent Flowsheet Documentation  Taken 2/6/2025 2000 by Samina Patrick RN  Diversional Activities: television  Intervention: Develop Pain Management Plan  Recent Flowsheet Documentation  Taken 2/6/2025 2258 by Samina Patrick, RN  Pain Management Interventions: pain medication given  Intervention: Prevent or Manage Pain  Recent Flowsheet Documentation  Taken 2/6/2025 2000 by Samina Patrick RN  Sensory Stimulation Regulation: television on  Medication Review/Management: medications reviewed   Goal Outcome Evaluation:         VSS on RA.  No acute events during shift.  D/C pending for 02/7.  POC ongoing.

## 2025-02-07 NOTE — DISCHARGE SUMMARY
[facility]      Name:  Shira Toro   Age:  34 y.o.  Sex:  female  :  1990  MRN:  [id number]   Visit Number:  [id number]    Admission Date:  2025  Date of Discharge:  2025  Primary Care Physician:  Gin Yoo, APRN      Discharge Diagnoses:     Acute Crohn's Flare  Hematochezia  Polysubstance abuse  Anxiety/Depression  GERD       Problem List:     Active Hospital Problems    Diagnosis  POA    **Crohn's colitis [K50.10]  Yes      Resolved Hospital Problems   No resolved problems to display.     Presenting Problem:    Chief Complaint   Patient presents with    Abdominal Pain      Consults:     Consulting Physician(s)         Provider   Role Specialty     Dora Ernst MD      Consulting Physician Gastroenterology          Procedures Performed:        History of presenting illness/Hospital Course:    This is a 34-year-old female with a past medical history of substance abuse, fibromyalgia, anxiety, depression, GERD, and Crohn's disease presenting with abdominal pain.   In ED, blood pressure 112/75, heart rate 131, respiratory rate 16, temperature 97.8 and O2 saturation 99% on room air. Labs on arrival showed sodium 135, potassium 3.5, BUN 9, creatinine 0.6, CRP 5.08, lactic acid 5.5, WBC 9.6, hemoglobin 8, platelet count 426. CT abdomen showed inflammatory wall thickening in the distal ileum extending to the terminal ileum and from the cecum to the proximal sigmoid colon suggesting acute Crohn's disease.     Acute Crohn's Flare  Norovirus  GI consulted  Improved with steroids.  Transition to p.o. budesonide at discharge.  GI PCR panel positive for norovirus  Monitor H&H; status post 1 unit PRBC -suspect anemia from chronic inflammation.  Patient did not have any evidence of bleeding while inpatient.  Polysubstance abuse   UDS + for methamphetamine  Counseled on cessation  Multiple wounds  Present on admission  Wound care consult    Follow-up  primary physician.  Patient also advised to follow-up with established GI at [facility] and resume [facility].    Vital Signs:    Temp:  [97.6 °F (36.4 °C)-98 °F (36.7 °C)] 98 °F (36.7 °C)  Heart Rate:  [74-94] 88  Resp:  [16-18] 18  BP: ()/(57-84) 98/57    Physical Exam:    General Appearance:  Alert and cooperative.    Head:  Atraumatic and normocephalic.   Eyes: Conjunctivae and sclerae normal, no icterus. No pallor.   Ears:  Ears with no abnormalities noted.   Throat: No oral lesions, no thrush, oral mucosa moist.   Neck: Supple, trachea midline, no thyromegaly.   Back:   No kyphoscoliosis present. No tenderness to palpation.   Lungs:   Breath sounds heard bilaterally equally.  No crackles or wheezing. No Pleural rub or bronchial breathing.   Heart:  Normal S1 and S2, no murmur, no gallop, no rub. No JVD.   Abdomen:   Normal bowel sounds, no masses, no organomegaly. Soft, nontender, nondistended, no rebound tenderness.   Extremities: Supple, no edema, no cyanosis, no clubbing.   Pulses: Pulses palpable bilaterally.   Skin: Circumferential healing wounds to torso and all extremities   Neurologic: Alert and oriented x 3. No facial asymmetry. Moves all four limbs. No tremors.     Pertinent Lab Results:     Results from last 7 days   Lab Units 02/06/25  0559 02/05/25  0759 02/04/25  0620 02/02/25  2041 02/02/25  1555   SODIUM mmol/L 140 142 140   < > 135*   POTASSIUM mmol/L 3.9 3.9 4.2   < > 3.5   CHLORIDE mmol/L 108* 109* 108*   < > 100   CO2 mmol/L 24.8 27.0 22.8   < > 22.5   BUN mg/dL 10 14 11   < > 9   CREATININE mg/dL 0.47* 0.54* 0.54*   < > 0.66   CALCIUM mg/dL 8.0* 7.8* 8.0*   < > 8.8   BILIRUBIN mg/dL  --   --   --   --  0.2   ALK PHOS U/L  --   --   --   --  98   ALT (SGPT) U/L  --   --   --   --  7   AST (SGOT) U/L  --   --   --   --  19   GLUCOSE mg/dL 77 91 96   < > 79    < > = values in this interval not displayed.     Results from last 7 days   Lab Units 02/06/25  0559 02/05/25  0759  "02/04/25  1304 02/04/25  0620   WBC 10*3/mm3 8.40 9.76  --  10.11   HEMOGLOBIN g/dL 8.2* 8.2* 8.8* 8.4*  8.4*   HEMATOCRIT % 27.9* 26.9* 29.1* 26.9*  27.2*   PLATELETS 10*3/mm3 434 488*  --  515*         Results from last 7 days   Lab Units 02/02/25  1555   HSTROP T ng/L <6             Results from last 7 days   Lab Units 02/02/25  1555   LIPASE U/L 9*               Pertinent Radiology Results:    Imaging Results (All)       Procedure Component Value Units Date/Time    CT Abdomen Pelvis With Contrast [[id number]] Collected: 02/02/25 1903     Updated: 02/02/25 1904    Narrative:      FINAL REPORT    TECHNIQUE:  null    CLINICAL HISTORY:  Diffuse abdominal pain, concerns for Crohn\"s flare    COMPARISON:  null    FINDINGS:  CT abdomen and pelvis with contrast    Comparison: None    Findings:    No consolidation or effusion.    The liver enhances homogeneously. Gallbladder is unremarkable.    No biliary dilatation. The portal and splenic veins appear patent.    The spleen, pancreas, and adrenal glands are unremarkable.    Both kidneys enhance symmetrically without hydroureteronephrosis.    Circumferential wall thickening of the distal ileum along the right hemipelvis extending to the terminal ileum.    Circumferential wall thickening from the cecum the proximal sigmoid colon with mild pericolonic stranding.    Liquid consistency stool in the colon. Appendix is normal.    No bowel obstruction, pneumoperitoneum, or pneumatosis.    Nonaneurysmal aorta.    No enlarged abdominal or pelvic lymph nodes.    The urinary bladder and uterus are unremarkable.    No pelvic free fluid.    There are no aggressive osseous lesions.      Impression:      IMPRESSION:    1. Inflammatory wall thickening in the distal ileum extending to the terminal ileum, and from the cecum to the proximal sigmoid colon suggesting acute Crohn's disease given patient's history.    2. Underlying liquid consistency stool reflecting diarrhea.    Authenticated " and Electronically Signed by Martin John MD on  02/02/2025 07:03:13 PM            Echo:      Condition on Discharge:      Stable.    Code status during the hospital stay:    Code Status and Medical Interventions: CPR (Attempt to Resuscitate); Full Support   Ordered at: 02/02/25 1941     Code Status (Patient has no pulse and is not breathing):    CPR (Attempt to Resuscitate)     Medical Interventions (Patient has pulse or is breathing):    Full Support     Discharge Disposition:    Home or Self Care    Discharge Medications:       Discharge Medications        New Medications        Instructions Start Date   Budesonide 3 MG 24 hr capsule  Commonly known as: Entocort EC   6 mg, Oral, Every Morning             Changes to Medications        Instructions Start Date   buPROPion  MG 12 hr tablet  Commonly known as: WELLBUTRIN SR  What changed: Another medication with the same name was removed. Continue taking this medication, and follow the directions you see here.   150 mg, Oral, 2 Times Daily, Take 1 tablet by mouth every day for 7 days, then increase to two times daily.             Continue These Medications        Instructions Start Date   DULoxetine 60 MG capsule  Commonly known as: CYMBALTA   1 capsule, Every Morning      folic acid 1 MG tablet  Commonly known as: FOLVITE   Take 1 tablet by mouth Daily.      Golimumab 100 MG/ML solution auto-injector   Inject  under the skin into the appropriate area as directed.      omeprazole 20 MG capsule  Commonly known as: priLOSEC   1 capsule, Daily      QUEtiapine 50 MG tablet  Commonly known as: SEROquel   TAKE 1-2 TABLETS BY MOUTH EVERY NIGHT AS NEEDED      silver sulfadiazine 1 % cream  Commonly known as: Silvadene   1 Application, Topical, 2 Times Daily             Stop These Medications      busPIRone 15 MG tablet  Commonly known as: BUSPAR     cyanocobalamin 1000 MCG/ML injection     gabapentin 800 MG tablet  Commonly known as: NEURONTIN     loperamide 2 MG  capsule  Commonly known as: IMODIUM     predniSONE 10 MG tablet  Commonly known as: DELTASONE     promethazine 25 MG tablet  Commonly known as: PHENERGAN     tiZANidine 4 MG tablet  Commonly known as: ZANAFLEX            Discharge Diet:     Diet Instructions       Diet: Gastrointestinal Diets; Low Irritant, Fiber-Restricted; Soft to Chew (NDD 3); Whole Meat; Thin (IDDSI 0)      Discharge Diet: Gastrointestinal Diets    Gastrointestinal Diet:  Low Irritant  Fiber-Restricted       Texture: Soft to Chew (NDD 3)    Soft to Chew: Whole Meat    Fluid Consistency: Thin (IDDSI 0)          Activity at Discharge:     Activity Instructions       Activity as Tolerated            Follow-up Appointments:    Additional Instructions for the Follow-ups that You Need to Schedule       Ambulatory Referral to Wound Clinic   As directed      Discharge Follow-up with PCP   As directed       Currently Documented PCP:    Gin Yoo APRN    PCP Phone Number:    [phone]     Follow Up Details: 1 week        Discharge Follow-up with Specified Provider: [facility]; 2 Weeks   As directed      To: [facility]   Follow Up: 2 Weeks               Follow-up Information       Gin Yoo APRN .    Specialty: Family Medicine  Why: 1 week  Contact information:  [address] [place] [id number]  [phone]               [facility] OUTPATIENT [facility]YS[facility]ER[facility] .    Specialty: Physical Therapy  Contact information:  [address] [place] [id number] [phone]                         No future appointments.  Test Results Pending at Discharge:    Pending Results       Procedure [Order ID] Specimen - Date/Time    Stool Culture (Reference Lab) - Stool, Per Rectum [[id number]]     Specimen: Stool from Per Rectum                  Alberto Denson DO  02/07/25  10:23 EST    Time: I spent >30 minutes on this discharge activity which included: face-to-face encounter with the patient, reviewing  the data in the system, coordination of the care with the nursing staff as well as consultants, documentation, and entering orders.     Dictated utilizing Dragon dictation.

## 2025-02-08 ENCOUNTER — READMISSION MANAGEMENT (OUTPATIENT)
Dept: CALL CENTER | Facility: HOSPITAL | Age: 35
End: 2025-02-08
Payer: MEDICAID

## 2025-02-08 NOTE — OUTREACH NOTE
Prep Survey      Flowsheet Row Responses   [facility] facility patient discharged from? [facility]   Is LACE score < 7 ? No   Eligibility Readm Mgmt   Discharge diagnosis Crohn's colitis   Does the patient have one of the following disease processes/diagnoses(primary or secondary)? Other   Prep survey completed? Yes            Abena LAWTON - Registered Nurse

## 2025-02-09 NOTE — PAYOR COMM NOTE
"To:  [employer]  From: Sunni Rosales RN  Phone: [phone]  Fax: [phone]  NPI: [id number]  TIN: [id number]  Member ID: [id number]   MRN: [id number]    Shira Weeks (34 y.o. Female)       Date of Birth   1990    Social Security Number       Address   [address] [place] [address]    Home Phone   [phone]    MRN   [id number]       Confucianist   Muslim    Marital Status   Single                            Admission Date   25    Admission Type   Emergency    Admitting Provider   Jagdish Rabago MD    Attending Provider       Department, Room/Bed   [facility] TELEMETRY 4, 423/1       Discharge Date   2025    Discharge Disposition   Home or Self Care    Discharge Destination                                 Attending Provider: (none)   Allergies: Codeine, Ibuprofen, Naproxen, Ondansetron Hcl, Tramadol, Acetaminophen-codeine, Adalimumab    Isolation: None   Infection: Norovirus (25)   Code Status: Prior    Ht: 152.4 cm (60\")   Wt: 68.4 kg (150 lb 12.7 oz)    Admission Cmt: None   Principal Problem: Crohn's colitis [K50.10]                   Active Insurance as of 2025       Primary Coverage       Payor Plan Insurance Group Employer/Plan Group    [place] [id number]       Payor Plan Address Payor Plan Phone Number Payor Plan Fax Number Effective Dates    [employer] [address] [address] [phone]  2020 - None Entered    [facility] [id number]         Subscriber Name Subscriber Birth Date Member ID       SHIRA WEEKS 1990 [id number]                     Emergency Contacts        (Rel.) Home Phone Work Phone Mobile Phone    masha weeks (Father) [phone] -- --                 Discharge Summary        Alberto Denson DO at 25 1023              [facility] HOSPITALIST   DISCHARGE SUMMARY      Name:  Shira Weeks   Age:  34 y.o.  Sex:  female  :  1990  MRN:  [id number]   Visit Number:  " [id number]    Admission Date:  2/2/2025  Date of Discharge:  2/7/2025  Primary Care Physician:  Gin Yoo, APRN      Discharge Diagnoses:     Acute Crohn's Flare  Hematochezia  Polysubstance abuse  Anxiety/Depression  GERD       Problem List:     Active Hospital Problems    Diagnosis  POA    **Crohn's colitis [K50.10]  Yes      Resolved Hospital Problems   No resolved problems to display.     Presenting Problem:    Chief Complaint   Patient presents with    Abdominal Pain      Consults:     Consulting Physician(s)         Provider   Role Specialty     Dora Ernst MD      Consulting Physician Gastroenterology          Procedures Performed:        History of presenting illness/Hospital Course:    This is a 34-year-old female with a past medical history of substance abuse, fibromyalgia, anxiety, depression, GERD, and Crohn's disease presenting with abdominal pain.   In ED, blood pressure 112/75, heart rate 131, respiratory rate 16, temperature 97.8 and O2 saturation 99% on room air. Labs on arrival showed sodium 135, potassium 3.5, BUN 9, creatinine 0.6, CRP 5.08, lactic acid 5.5, WBC 9.6, hemoglobin 8, platelet count 426. CT abdomen showed inflammatory wall thickening in the distal ileum extending to the terminal ileum and from the cecum to the proximal sigmoid colon suggesting acute Crohn's disease.     Acute Crohn's Flare  Norovirus  GI consulted  Improved with steroids.  Transition to p.o. budesonide at discharge.  GI PCR panel positive for norovirus  Monitor H&H; status post 1 unit PRBC -suspect anemia from chronic inflammation.  Patient did not have any evidence of bleeding while inpatient.  Polysubstance abuse   UDS + for methamphetamine  Counseled on cessation  Multiple wounds  Present on admission  Wound care consult    Follow-up primary physician.  Patient also advised to follow-up with established GI at [facility] and resume [facility].    Vital Signs:    Temp:  [97.6 °F (36.4 °C)-98 °F  (36.7 °C)] 98 °F (36.7 °C)  Heart Rate:  [74-94] 88  Resp:  [16-18] 18  BP: ()/(57-84) 98/57    Physical Exam:    General Appearance:  Alert and cooperative.    Head:  Atraumatic and normocephalic.   Eyes: Conjunctivae and sclerae normal, no icterus. No pallor.   Ears:  Ears with no abnormalities noted.   Throat: No oral lesions, no thrush, oral mucosa moist.   Neck: Supple, trachea midline, no thyromegaly.   Back:   No kyphoscoliosis present. No tenderness to palpation.   Lungs:   Breath sounds heard bilaterally equally.  No crackles or wheezing. No Pleural rub or bronchial breathing.   Heart:  Normal S1 and S2, no murmur, no gallop, no rub. No JVD.   Abdomen:   Normal bowel sounds, no masses, no organomegaly. Soft, nontender, nondistended, no rebound tenderness.   Extremities: Supple, no edema, no cyanosis, no clubbing.   Pulses: Pulses palpable bilaterally.   Skin: Circumferential healing wounds to torso and all extremities   Neurologic: Alert and oriented x 3. No facial asymmetry. Moves all four limbs. No tremors.     Pertinent Lab Results:     Results from last 7 days   Lab Units 02/06/25  0559 02/05/25  0759 02/04/25  0620 02/02/25  2041 02/02/25  1555   SODIUM mmol/L 140 142 140   < > 135*   POTASSIUM mmol/L 3.9 3.9 4.2   < > 3.5   CHLORIDE mmol/L 108* 109* 108*   < > 100   CO2 mmol/L 24.8 27.0 22.8   < > 22.5   BUN mg/dL 10 14 11   < > 9   CREATININE mg/dL 0.47* 0.54* 0.54*   < > 0.66   CALCIUM mg/dL 8.0* 7.8* 8.0*   < > 8.8   BILIRUBIN mg/dL  --   --   --   --  0.2   ALK PHOS U/L  --   --   --   --  98   ALT (SGPT) U/L  --   --   --   --  7   AST (SGOT) U/L  --   --   --   --  19   GLUCOSE mg/dL 77 91 96   < > 79    < > = values in this interval not displayed.     Results from last 7 days   Lab Units 02/06/25  0559 02/05/25  0759 02/04/25  1304 02/04/25  0620   WBC 10*3/mm3 8.40 9.76  --  10.11   HEMOGLOBIN g/dL 8.2* 8.2* 8.8* 8.4*  8.4*   HEMATOCRIT % 27.9* 26.9* 29.1* 26.9*  27.2*   PLATELETS  "10*3/mm3 434 488*  --  515*         Results from last 7 days   Lab Units 02/02/25  1555   HSTROP T ng/L <6             Results from last 7 days   Lab Units 02/02/25  1555   LIPASE U/L 9*               Pertinent Radiology Results:    Imaging Results (All)       Procedure Component Value Units Date/Time    CT Abdomen Pelvis With Contrast [[id number]] Collected: 02/02/25 1903     Updated: 02/02/25 1904    Narrative:      FINAL REPORT    TECHNIQUE:  null    CLINICAL HISTORY:  Diffuse abdominal pain, concerns for Crohn\"s flare    COMPARISON:  null    FINDINGS:  CT abdomen and pelvis with contrast    Comparison: None    Findings:    No consolidation or effusion.    The liver enhances homogeneously. Gallbladder is unremarkable.    No biliary dilatation. The portal and splenic veins appear patent.    The spleen, pancreas, and adrenal glands are unremarkable.    Both kidneys enhance symmetrically without hydroureteronephrosis.    Circumferential wall thickening of the distal ileum along the right hemipelvis extending to the terminal ileum.    Circumferential wall thickening from the cecum the proximal sigmoid colon with mild pericolonic stranding.    Liquid consistency stool in the colon. Appendix is normal.    No bowel obstruction, pneumoperitoneum, or pneumatosis.    Nonaneurysmal aorta.    No enlarged abdominal or pelvic lymph nodes.    The urinary bladder and uterus are unremarkable.    No pelvic free fluid.    There are no aggressive osseous lesions.      Impression:      IMPRESSION:    1. Inflammatory wall thickening in the distal ileum extending to the terminal ileum, and from the cecum to the proximal sigmoid colon suggesting acute Crohn's disease given patient's history.    2. Underlying liquid consistency stool reflecting diarrhea.    Authenticated and Electronically Signed by Martin John MD on  02/02/2025 07:03:13 PM            Echo:      Condition on Discharge:      Stable.    Code status during the hospital " stay:    Code Status and Medical Interventions: CPR (Attempt to Resuscitate); Full Support   Ordered at: 02/02/25 1941     Code Status (Patient has no pulse and is not breathing):    CPR (Attempt to Resuscitate)     Medical Interventions (Patient has pulse or is breathing):    Full Support     Discharge Disposition:    Home or Self Care    Discharge Medications:       Discharge Medications        New Medications        Instructions Start Date   Budesonide 3 MG 24 hr capsule  Commonly known as: Entocort EC   6 mg, Oral, Every Morning             Changes to Medications        Instructions Start Date   buPROPion  MG 12 hr tablet  Commonly known as: WELLBUTRIN SR  What changed: Another medication with the same name was removed. Continue taking this medication, and follow the directions you see here.   150 mg, Oral, 2 Times Daily, Take 1 tablet by mouth every day for 7 days, then increase to two times daily.             Continue These Medications        Instructions Start Date   DULoxetine 60 MG capsule  Commonly known as: CYMBALTA   1 capsule, Every Morning      folic acid 1 MG tablet  Commonly known as: FOLVITE   Take 1 tablet by mouth Daily.      Golimumab 100 MG/ML solution auto-injector   Inject  under the skin into the appropriate area as directed.      omeprazole 20 MG capsule  Commonly known as: priLOSEC   1 capsule, Daily      QUEtiapine 50 MG tablet  Commonly known as: SEROquel   TAKE 1-2 TABLETS BY MOUTH EVERY NIGHT AS NEEDED      silver sulfadiazine 1 % cream  Commonly known as: Silvadene   1 Application, Topical, 2 Times Daily             Stop These Medications      busPIRone 15 MG tablet  Commonly known as: BUSPAR     cyanocobalamin 1000 MCG/ML injection     gabapentin 800 MG tablet  Commonly known as: NEURONTIN     loperamide 2 MG capsule  Commonly known as: IMODIUM     predniSONE 10 MG tablet  Commonly known as: DELTASONE     promethazine 25 MG tablet  Commonly known as: PHENERGAN     tiZANidine 4  MG tablet  Commonly known as: ZANAFLEX            Discharge Diet:     Diet Instructions       Diet: Gastrointestinal Diets; Low Irritant, Fiber-Restricted; Soft to Chew (NDD 3); Whole Meat; Thin (IDDSI 0)      Discharge Diet: Gastrointestinal Diets    Gastrointestinal Diet:  Low Irritant  Fiber-Restricted       Texture: Soft to Chew (NDD 3)    Soft to Chew: Whole Meat    Fluid Consistency: Thin (IDDSI 0)          Activity at Discharge:     Activity Instructions       Activity as Tolerated            Follow-up Appointments:    Additional Instructions for the Follow-ups that You Need to Schedule       Ambulatory Referral to Wound Clinic   As directed      Discharge Follow-up with PCP   As directed       Currently Documented PCP:    Gin Yoo APRN    PCP Phone Number:    [phone]     Follow Up Details: 1 week        Discharge Follow-up with Specified Provider: [facility]; 2 Weeks   As directed      To:  Gastroenterology   Follow Up: 2 Weeks               Follow-up Information       Gin Yoo APRN .    Specialty: Family Medicine  Why: 1 week  Contact information:  [address] [place] [id number] [phone]               [facility] OUTPATIENT PHYSICAL THERAPY .    Specialty: Physical Therapy  Contact information:  [address] [place] [address] [phone]                         No future appointments.  Test Results Pending at Discharge:    Pending Results       Procedure [Order ID] Specimen - Date/Time    Stool Culture (Reference Lab) - Stool, Per Rectum [[id number]]     Specimen: Stool from Per Rectum                  Alberto Denson DO  02/07/25  10:23 EST    Time: I spent >30 minutes on this discharge activity which included: face-to-face encounter with the patient, reviewing the data in the system, coordination of the care with the nursing staff as well as consultants, documentation, and entering orders.     Dictated utilizing Jose J  dictation.        Electronically signed by Alberto Denson DO at 02/07/25 1272

## 2025-02-10 DIAGNOSIS — M79.7 FIBROMYALGIA: ICD-10-CM

## 2025-02-10 DIAGNOSIS — F32.9 REACTIVE DEPRESSION: ICD-10-CM

## 2025-02-10 DIAGNOSIS — K50.919 ACUTE CROHN'S DISEASE WITH COMPLICATION (HCC): ICD-10-CM

## 2025-02-11 RX ORDER — DULOXETIN HYDROCHLORIDE 60 MG/1
60 CAPSULE, DELAYED RELEASE ORAL EVERY MORNING
Qty: 30 CAPSULE | Refills: 0 | Status: SHIPPED | OUTPATIENT
Start: 2025-02-11

## 2025-02-12 ENCOUNTER — READMISSION MANAGEMENT (OUTPATIENT)
Dept: CALL CENTER | Facility: HOSPITAL | Age: 35
End: 2025-02-12
Payer: MEDICAID

## 2025-02-12 NOTE — OUTREACH NOTE
Medical Week 1 Survey      Flowsheet Row Responses   [facility] patient discharged from? [facility]   Does the patient have one of the following disease processes/diagnoses(primary or secondary)? Other   Week 1 attempt successful? Yes   Call start time 1007   Call end time 1018   Discharge diagnosis Crohn's colitis   Does the patient have all medications ordered at discharge? No   Medication comments Patient states she was supposed to be prescribed abx   Does the patient have a primary care provider?  Yes   Comments regarding PCP PCP appt 2/14/2025   Psychosocial issues? No   Nursing interventions Reviewed instructions with patient   What is the patient's perception of their health status since discharge? Improving   Is the patient/caregiver able to teach back signs and symptoms related to disease process for when to call PCP? Yes   Is the patient/caregiver able to teach back signs and symptoms related to disease process for when to call 911? Yes   Is the patient/caregiver able to teach back the hierarchy of who to call/visit for symptoms/problems? PCP, Specialist, Home health nurse, Urgent Care, ED, 911 Yes   Week 1 call completed? Yes   Would this patient benefit from a Referral to Amb Social Work? No   Is the patient interested in additional calls from an ambulatory ? No   Wrap up additional comments Patient states improving. Patient was not discharged with antibiotic and was supposed to be. Will send message to inpatient CM.   Call end time 1018            SUDEEP KAUFMAN - Registered Nurse

## 2025-02-17 ENCOUNTER — SOCIAL WORK (OUTPATIENT)
Facility: HOSPITAL | Age: 35
End: 2025-02-17

## 2025-02-17 NOTE — PROGRESS NOTES
Pt is a 34 year old female with history of GAVI.Pt had recently been at [facility] Friday 2/14/25 when the  Solomon Huynh was called in to speak with Pt.An appt at [facility] was made.Pss sent a message to Pts phone to follow up.    Pss Sahra Wagner
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
## 2025-02-18 ENCOUNTER — SOCIAL WORK (OUTPATIENT)
Facility: HOSPITAL | Age: 35
End: 2025-02-18

## 2025-02-18 NOTE — PROGRESS NOTES
Pt is a 34 year old female with history of GAVI.Pt messaged pss back. Pss trying to set up a QRT visit with her currently.Will follow up if needed or requested.    PSS Sahra Wagner

## 2025-02-21 ENCOUNTER — READMISSION MANAGEMENT (OUTPATIENT)
Dept: CALL CENTER | Facility: HOSPITAL | Age: 35
End: 2025-02-21
Payer: MEDICAID

## 2025-02-21 NOTE — OUTREACH NOTE
Medical Week 2 Survey      Flowsheet Row Responses   [facility] patient discharged from? [facility]   Does the patient have one of the following disease processes/diagnoses(primary or secondary)? Other   Week 2 attempt successful? Yes   Call start time 1729   Discharge diagnosis Acute Crohn's Flare  Hematochezia  Polysubstance abuse  Anxiety/Depression  GERD   Call end time 1732   Person spoke with today (if not patient) and relationship Patient   Meds reviewed with patient/caregiver? Yes   Does the patient have all medications ordered at discharge? Yes   Is the patient taking all medications as directed (includes completed medication regime)? Yes   Does the patient have a primary care provider?  Yes   Comments regarding PCP PCP Gin CABELLO. Patient states that she has had PCP appt since discharge.   Has the patient kept scheduled appointments due by today? Yes   Comments Patient has not been able to get in yet with [facility].   Has home health visited the patient within 72 hours of discharge? N/A   Psychosocial issues? No   Did the patient receive a copy of their discharge instructions? Yes   Nursing interventions Reviewed instructions with patient   What is the patient's perception of their health status since discharge? Improving   Is the patient/caregiver able to teach back signs and symptoms related to disease process for when to call PCP? Yes   Is the patient/caregiver able to teach back the hierarchy of who to call/visit for symptoms/problems? PCP, Specialist, Home health nurse, Urgent Care, ED, 911 Yes   If the patient is a current smoker, are they able to teach back resources for cessation? Not a smoker   Week 2 Call Completed? Yes   Is the patient interested in additional calls from an ambulatory ? No   Would this patient benefit from a Referral to [facility] Social Work? No   Call end time 1732            VIANEY JOY - Registered Nurse

## 2025-02-26 ENCOUNTER — SOCIAL WORK (OUTPATIENT)
Facility: HOSPITAL | Age: 35
End: 2025-02-26

## 2025-02-26 NOTE — PROGRESS NOTES
Pt is a 34 year old female with history of GAVI.Pt had recently reached out to PSS about wanting help. COMFORT Gibson and PSS made a visit to pts aunts house. Pt wanted to wait til early next week before committing to IP treatment.Pt stated she had things to get together before leaving.COMF[facility] scheduled another visit for Monday to do an assessment.Will follow up if needed or requested.    PSS Sahra Wagner

## 2025-03-12 ENCOUNTER — SOCIAL WORK (OUTPATIENT)
Facility: HOSPITAL | Age: 35
End: 2025-03-12

## 2025-03-12 NOTE — PROGRESS NOTES
Pt is a 34 year old female with history of GAVI and homelessness. COMFORT [facility] and pss helped Pt do an assessment with [facility] on 3/36/2025.COMFORT [facility] and pss transported pt there.Will follow up if needed or requested.    AMINTA Wagner

## 2025-06-05 NOTE — CODE DOCUMENTATION
UA collected using sterile technique by this [facility] per 's request. All lines and tubes removed at this time. Post mortem care performed.

## 2025-06-05 NOTE — ED PROVIDER NOTES
Subjective:  History of Present Illness:    Patient is a 34-year-old female with history of Crohn's disease, fibromyalgia, substance abuse who presents today in cardiac arrest.  Reportedly was found by significant other down in the restroom.  States that he believes that he heard her fall and went in and checked on her 15 minutes later and found that she was not breathing.  EMS response time was 3 minutes per their report.  Reports that they found that patient was in asystole.  3 rounds of epinephrine with no improvement.  No reaction.  In asystole throughout.  Now presents to our emergency department.      Nurses Notes reviewed and agree, including vitals, allergies, social history and prior medical history.     REVIEW OF SYSTEMS: All systems reviewed and not pertinent unless noted.  Review of Systems   Unable to perform ROS: Patient unresponsive       Past Medical History:   Diagnosis Date    Anemia     Anxiety     Arthritis     Autoimmune disease     chrohns related.     Crohn's disease     Fibromyalgia     Substance abuse        Allergies:    Codeine, Ibuprofen, Naproxen, Ondansetron hcl, Tramadol, Acetaminophen-codeine, and Adalimumab      Past Surgical History:   Procedure Laterality Date    COLONOSCOPY      URACHUS EXCISION      urachus rupture as a baby         Social History     Socioeconomic History    Marital status: Single   Tobacco Use    Smoking status: Former     Current packs/day: 0.50     Types: Cigarettes     Passive exposure: Past    Smokeless tobacco: Never   Vaping Use    Vaping status: Every Day    Substances: Nicotine, Flavoring    Devices: Refillable tank    Passive vaping exposure: Yes   Substance and Sexual Activity    Alcohol use: Yes     Comment: occasionally    Drug use: Yes     Types: Heroin, Marijuana, Methamphetamines     Comment: medical marijuana card per pt    Sexual activity: Yes     Partners: Male     Birth control/protection: Nexplanon         Family History   Problem Relation  Age of Onset    Coronary artery disease Mother     Heart attack Mother     Hypertension Mother     Hypertension Brother        Objective  Physical Exam:  Temp 92.6 °F (33.7 °C) (Rectal)      Physical Exam  Constitutional:       Appearance: She is normal weight. She is ill-appearing and toxic-appearing.   HENT:      Head: Atraumatic.      Nose: Nose normal.   Eyes:      Comments: Eyes fixed and nonreactive   Cardiovascular:      Comments: Pulseless on exam  Pulmonary:      Comments: Mechanically ventilated with breath sounds bilateral  Abdominal:      General: Abdomen is flat.      Palpations: Abdomen is soft.   Musculoskeletal:      Comments: No obvious movement, IO in place   Skin:     Capillary Refill: Capillary refill takes more than 3 seconds.   Neurological:      Comments: Eyes fixed and unresponsive, no corneal reflex noted, no appreciable movement noted   Psychiatric:      Comments: Unable to assess given the patient's critical status         Procedures    ED Course:         Lab Results (last 24 hours)       Procedure Component Value Units Date/Time    Urine Drug Screen - Urine, Clean Catch [[id number]]  (Abnormal) Collected: 06/05/25 1333    Specimen: Urine, Clean Catch Updated: 06/05/25 1359     THC, Screen, Urine Positive     Phencyclidine (PCP), Urine Negative     Cocaine Screen, Urine Negative     Methamphetamine, Ur Positive     Opiate Screen Negative     Amphetamine Screen, Urine Negative     Benzodiazepine Screen, Urine Negative     Tricyclic Antidepressants Screen Negative     Methadone Screen, Urine Negative     Barbiturates Screen, Urine Negative     Oxycodone Screen, Urine Negative     Buprenorphine, Screen, Urine Positive    Narrative:      Cutoff For Drugs Screened:    Amphetamines               500 ng/ml  Barbiturates               200 ng/ml  Benzodiazepines            150 ng/ml  Cocaine                    150 ng/ml  Methadone                  200 ng/ml  Opiates                    100  ng/ml  Phencyclidine               25 ng/ml  THC                         50 ng/ml  Methamphetamine            500 ng/ml  Tricyclic Antidepressants  300 ng/ml  Oxycodone                  100 ng/ml  Buprenorphine               10 ng/ml    The normal value for all drugs tested is negative. This report includes unconfirmed screening results, with the cutoff values listed, to be used for medical treatment purposes only.  Unconfirmed results must not be used for non-medical purposes such as employment or legal testing.  Clinical consideration should be applied to any drug of abuse test, particularly when unconfirmed results are used.      Fentanyl, Urine - Urine, Clean Catch [[id number]]  (Normal) Collected: 06/05/25 1333    Specimen: Urine, Clean Catch Updated: 06/05/25 1425     Fentanyl, Urine Negative    Narrative:      Negative Threshold:      Fentanyl 5 ng/mL     The normal value for the drug tested is negative. This report includes final unconfirmed screening results to be used for medical treatment purposes only. Unconfirmed results must not be used for non-medical purposes such as employment or legal testing. Clinical consideration should be applied to any drug of abuse test, particularly when unconfirmed results are used.                    No radiology results from the last 24 hrs       MDM      Initial impression of presenting illness: Cardiac arrest    DDX: includes but is not limited to: Substance abuse, PE, sepsis, cardiac arrhythmia    Patient arrives critical with vitals interpreted by myself.     Pertinent features from physical exam: Pulseless and apneic on exam, mechanically ventilated with breath sounds bilateral, skin appears mottled on exam, abdomen soft.    Initial diagnostic plan: Obtain glucose and placed on the monitor    Results from initial plan were reviewed and interpreted by me revealing glucose within normal limits, patient in asystole    Diagnostic information from other sources: Discussed  with EMS on arrival and reviewed past medical records    Interventions / Re-evaluation: Patient arrives in asystole.  Now over 40 minutes of asystole with no appreciable change.  Signs of lividity on exam.  Given this, I feel as though further efforts are futile and efforts were ceased.    Results/clinical rationale were discussed with  was notified    Consultations/Discussion of results with other physicians: Discussed case with , family is not available at the bedside    Disposition plan:   -----        Final diagnoses:   Cardiac arrest          Jared Porras MD  25 0229